# Patient Record
Sex: MALE | Race: WHITE | NOT HISPANIC OR LATINO | Employment: FULL TIME | URBAN - METROPOLITAN AREA
[De-identification: names, ages, dates, MRNs, and addresses within clinical notes are randomized per-mention and may not be internally consistent; named-entity substitution may affect disease eponyms.]

---

## 2017-09-05 PROBLEM — R13.10 DYSPHAGIA: Status: ACTIVE | Noted: 2017-09-05

## 2017-09-26 PROBLEM — C34.91 BRONCHOGENIC CANCER OF RIGHT LUNG: Status: ACTIVE | Noted: 2017-09-26

## 2017-10-03 ENCOUNTER — TELEPHONE (OUTPATIENT)
Dept: INFUSION THERAPY | Facility: HOSPITAL | Age: 67
End: 2017-10-03

## 2017-10-09 PROBLEM — C15.9 MALIGNANT NEOPLASM OF ESOPHAGUS: Status: ACTIVE | Noted: 2017-10-09

## 2017-10-10 ENCOUNTER — INFUSION (OUTPATIENT)
Dept: INFUSION THERAPY | Facility: HOSPITAL | Age: 67
End: 2017-10-10
Attending: INTERNAL MEDICINE
Payer: MEDICARE

## 2017-10-10 VITALS
SYSTOLIC BLOOD PRESSURE: 146 MMHG | WEIGHT: 159.19 LBS | BODY MASS INDEX: 25.58 KG/M2 | HEIGHT: 66 IN | DIASTOLIC BLOOD PRESSURE: 63 MMHG | HEART RATE: 57 BPM | TEMPERATURE: 99 F | RESPIRATION RATE: 18 BRPM

## 2017-10-10 DIAGNOSIS — C34.91 BRONCHOGENIC CANCER OF RIGHT LUNG: Primary | ICD-10-CM

## 2017-10-10 PROCEDURE — 63600175 PHARM REV CODE 636 W HCPCS: Mod: PN | Performed by: INTERNAL MEDICINE

## 2017-10-10 PROCEDURE — 25000003 PHARM REV CODE 250: Mod: PN | Performed by: INTERNAL MEDICINE

## 2017-10-10 PROCEDURE — 96413 CHEMO IV INFUSION 1 HR: CPT | Mod: PN

## 2017-10-10 RX ORDER — SODIUM CHLORIDE 0.9 % (FLUSH) 0.9 %
10 SYRINGE (ML) INJECTION
Status: DISCONTINUED | OUTPATIENT
Start: 2017-10-10 | End: 2017-10-10 | Stop reason: HOSPADM

## 2017-10-10 RX ORDER — HEPARIN 100 UNIT/ML
500 SYRINGE INTRAVENOUS
Status: DISCONTINUED | OUTPATIENT
Start: 2017-10-10 | End: 2017-10-10 | Stop reason: HOSPADM

## 2017-10-10 RX ADMIN — SODIUM CHLORIDE: 9 INJECTION, SOLUTION INTRAVENOUS at 02:10

## 2017-10-10 RX ADMIN — SODIUM CHLORIDE 152.5 MG: 9 INJECTION, SOLUTION INTRAVENOUS at 02:10

## 2017-10-20 ENCOUNTER — LAB VISIT (OUTPATIENT)
Dept: LAB | Facility: HOSPITAL | Age: 67
End: 2017-10-20
Attending: INTERNAL MEDICINE
Payer: MEDICARE

## 2017-10-20 DIAGNOSIS — R93.89 ABNORMAL FINDINGS ON DIAGNOSTIC IMAGING OF OTHER SPECIFIED BODY STRUCTURES: ICD-10-CM

## 2017-10-20 DIAGNOSIS — C15.9 ESOPHAGEAL CANCER: Primary | ICD-10-CM

## 2017-10-20 LAB
ALBUMIN SERPL BCP-MCNC: 3.2 G/DL
ALP SERPL-CCNC: 124 U/L
ALT SERPL W/O P-5'-P-CCNC: 21 U/L
ANION GAP SERPL CALC-SCNC: 12 MMOL/L
AST SERPL-CCNC: 15 U/L
BASOPHILS # BLD AUTO: 0 K/UL
BASOPHILS NFR BLD: 0.4 %
BILIRUB SERPL-MCNC: 0.3 MG/DL
BUN SERPL-MCNC: 23 MG/DL
CALCIUM SERPL-MCNC: 9.8 MG/DL
CHLORIDE SERPL-SCNC: 95 MMOL/L
CO2 SERPL-SCNC: 28 MMOL/L
CREAT SERPL-MCNC: 0.9 MG/DL
DIFFERENTIAL METHOD: ABNORMAL
EOSINOPHIL # BLD AUTO: 0.1 K/UL
EOSINOPHIL NFR BLD: 1.3 %
ERYTHROCYTE [DISTWIDTH] IN BLOOD BY AUTOMATED COUNT: 13.5 %
EST. GFR  (AFRICAN AMERICAN): >60 ML/MIN/1.73 M^2
EST. GFR  (NON AFRICAN AMERICAN): >60 ML/MIN/1.73 M^2
GLUCOSE SERPL-MCNC: 81 MG/DL
HCT VFR BLD AUTO: 34.1 %
HGB BLD-MCNC: 11.5 G/DL
LYMPHOCYTES # BLD AUTO: 2 K/UL
LYMPHOCYTES NFR BLD: 17.5 %
MAGNESIUM SERPL-MCNC: 2.3 MG/DL
MCH RBC QN AUTO: 28.7 PG
MCHC RBC AUTO-ENTMCNC: 33.6 G/DL
MCV RBC AUTO: 85 FL
MONOCYTES # BLD AUTO: 1 K/UL
MONOCYTES NFR BLD: 8.9 %
NEUTROPHILS # BLD AUTO: 8.1 K/UL
NEUTROPHILS NFR BLD: 71.9 %
PLATELET # BLD AUTO: 499 K/UL
PMV BLD AUTO: 8.1 FL
POTASSIUM SERPL-SCNC: 4.6 MMOL/L
PROT SERPL-MCNC: 6.9 G/DL
RBC # BLD AUTO: 4 M/UL
SODIUM SERPL-SCNC: 135 MMOL/L
TSH SERPL DL<=0.005 MIU/L-ACNC: 1.71 UIU/ML
WBC # BLD AUTO: 11.2 K/UL

## 2017-10-20 PROCEDURE — 80053 COMPREHEN METABOLIC PANEL: CPT

## 2017-10-20 PROCEDURE — 84443 ASSAY THYROID STIM HORMONE: CPT

## 2017-10-20 PROCEDURE — 36415 COLL VENOUS BLD VENIPUNCTURE: CPT

## 2017-10-20 PROCEDURE — 85025 COMPLETE CBC W/AUTO DIFF WBC: CPT

## 2017-10-20 PROCEDURE — 83735 ASSAY OF MAGNESIUM: CPT

## 2017-10-31 ENCOUNTER — INFUSION (OUTPATIENT)
Dept: INFUSION THERAPY | Facility: HOSPITAL | Age: 67
End: 2017-10-31
Attending: INTERNAL MEDICINE
Payer: MEDICARE

## 2017-10-31 VITALS
DIASTOLIC BLOOD PRESSURE: 56 MMHG | TEMPERATURE: 99 F | BODY MASS INDEX: 25.12 KG/M2 | HEIGHT: 66 IN | HEART RATE: 62 BPM | WEIGHT: 156.31 LBS | RESPIRATION RATE: 18 BRPM | SYSTOLIC BLOOD PRESSURE: 118 MMHG

## 2017-10-31 DIAGNOSIS — C34.91 BRONCHOGENIC CANCER OF RIGHT LUNG: Primary | ICD-10-CM

## 2017-10-31 PROCEDURE — 63600175 PHARM REV CODE 636 W HCPCS: Mod: JW,PN | Performed by: NURSE PRACTITIONER

## 2017-10-31 PROCEDURE — 25000003 PHARM REV CODE 250: Mod: PN | Performed by: NURSE PRACTITIONER

## 2017-10-31 PROCEDURE — 96413 CHEMO IV INFUSION 1 HR: CPT | Mod: PN

## 2017-10-31 RX ADMIN — SODIUM CHLORIDE: 9 INJECTION, SOLUTION INTRAVENOUS at 01:10

## 2017-10-31 RX ADMIN — SODIUM CHLORIDE 144.5 MG: 9 INJECTION, SOLUTION INTRAVENOUS at 02:10

## 2017-11-21 ENCOUNTER — INFUSION (OUTPATIENT)
Dept: INFUSION THERAPY | Facility: HOSPITAL | Age: 67
End: 2017-11-21
Attending: INTERNAL MEDICINE
Payer: MEDICARE

## 2017-11-21 VITALS
WEIGHT: 152.5 LBS | RESPIRATION RATE: 16 BRPM | OXYGEN SATURATION: 99 % | SYSTOLIC BLOOD PRESSURE: 124 MMHG | HEART RATE: 55 BPM | HEIGHT: 66 IN | DIASTOLIC BLOOD PRESSURE: 57 MMHG | BODY MASS INDEX: 24.51 KG/M2 | TEMPERATURE: 98 F

## 2017-11-21 DIAGNOSIS — C34.91 BRONCHOGENIC CANCER OF RIGHT LUNG: Primary | ICD-10-CM

## 2017-11-21 PROCEDURE — A4216 STERILE WATER/SALINE, 10 ML: HCPCS | Mod: PN | Performed by: PHYSICIAN ASSISTANT

## 2017-11-21 PROCEDURE — 63600175 PHARM REV CODE 636 W HCPCS: Mod: PN | Performed by: PHYSICIAN ASSISTANT

## 2017-11-21 PROCEDURE — 96413 CHEMO IV INFUSION 1 HR: CPT | Mod: PN

## 2017-11-21 PROCEDURE — 96367 TX/PROPH/DG ADDL SEQ IV INF: CPT | Mod: PN

## 2017-11-21 PROCEDURE — 25000003 PHARM REV CODE 250: Mod: PN | Performed by: PHYSICIAN ASSISTANT

## 2017-11-21 RX ORDER — OXYCODONE HYDROCHLORIDE 5 MG/1
TABLET ORAL
Refills: 0 | COMMUNITY
Start: 2017-11-11 | End: 2017-12-12 | Stop reason: SDUPTHER

## 2017-11-21 RX ORDER — SODIUM CHLORIDE 0.9 % (FLUSH) 0.9 %
10 SYRINGE (ML) INJECTION
Status: DISCONTINUED | OUTPATIENT
Start: 2017-11-21 | End: 2017-11-21 | Stop reason: HOSPADM

## 2017-11-21 RX ADMIN — SODIUM CHLORIDE 16 MG: 9 INJECTION, SOLUTION INTRAVENOUS at 02:11

## 2017-11-21 RX ADMIN — SODIUM CHLORIDE 144 MG: 900 INJECTION, SOLUTION INTRAVENOUS at 03:11

## 2017-11-21 RX ADMIN — SODIUM CHLORIDE: 900 INJECTION, SOLUTION INTRAVENOUS at 02:11

## 2017-11-21 RX ADMIN — SODIUM CHLORIDE, PRESERVATIVE FREE 10 ML: 5 INJECTION INTRAVENOUS at 02:11

## 2017-11-21 NOTE — PLAN OF CARE
Problem: Patient Care Overview  Goal: Plan of Care Review  Outcome: Ongoing (interventions implemented as appropriate)  Pt received Keytruda, tolerated well, AVS given to pt, discharged, ambulated from clinic with steady gait

## 2017-12-12 ENCOUNTER — INFUSION (OUTPATIENT)
Dept: INFUSION THERAPY | Facility: HOSPITAL | Age: 67
End: 2017-12-12
Attending: INTERNAL MEDICINE
Payer: MEDICARE

## 2017-12-12 VITALS
HEART RATE: 54 BPM | RESPIRATION RATE: 16 BRPM | WEIGHT: 147 LBS | TEMPERATURE: 99 F | DIASTOLIC BLOOD PRESSURE: 59 MMHG | SYSTOLIC BLOOD PRESSURE: 117 MMHG | BODY MASS INDEX: 23.63 KG/M2 | HEIGHT: 66 IN

## 2017-12-12 DIAGNOSIS — C34.91 BRONCHOGENIC CANCER OF RIGHT LUNG: Primary | ICD-10-CM

## 2017-12-12 PROBLEM — K57.32 DIVERTICULITIS OF LARGE INTESTINE WITHOUT PERFORATION OR ABSCESS WITHOUT BLEEDING: Status: ACTIVE | Noted: 2017-12-12

## 2017-12-12 PROCEDURE — 63600175 PHARM REV CODE 636 W HCPCS: Mod: JW,PN | Performed by: NURSE PRACTITIONER

## 2017-12-12 PROCEDURE — 25000003 PHARM REV CODE 250: Mod: PN | Performed by: NURSE PRACTITIONER

## 2017-12-12 PROCEDURE — A4216 STERILE WATER/SALINE, 10 ML: HCPCS | Mod: PN | Performed by: NURSE PRACTITIONER

## 2017-12-12 PROCEDURE — 96413 CHEMO IV INFUSION 1 HR: CPT | Mod: PN

## 2017-12-12 RX ORDER — SODIUM CHLORIDE 0.9 % (FLUSH) 0.9 %
10 SYRINGE (ML) INJECTION
Status: DISCONTINUED | OUTPATIENT
Start: 2017-12-12 | End: 2017-12-13 | Stop reason: HOSPADM

## 2017-12-12 RX ADMIN — SODIUM CHLORIDE 144.5 MG: 9 INJECTION, SOLUTION INTRAVENOUS at 03:12

## 2017-12-12 RX ADMIN — SODIUM CHLORIDE: 900 INJECTION, SOLUTION INTRAVENOUS at 03:12

## 2017-12-12 RX ADMIN — Medication 10 ML: at 04:12

## 2017-12-12 RX ADMIN — Medication 10 ML: at 03:12

## 2017-12-29 ENCOUNTER — LAB VISIT (OUTPATIENT)
Dept: LAB | Facility: HOSPITAL | Age: 67
End: 2017-12-29
Attending: INTERNAL MEDICINE
Payer: MEDICARE

## 2017-12-29 DIAGNOSIS — C34.91 BRONCHOGENIC CANCER OF RIGHT LUNG: ICD-10-CM

## 2017-12-29 DIAGNOSIS — E07.9 THYROID DYSFUNCTION: ICD-10-CM

## 2017-12-29 LAB
ALBUMIN SERPL BCP-MCNC: 3.8 G/DL
ALP SERPL-CCNC: 116 U/L
ALT SERPL W/O P-5'-P-CCNC: 55 U/L
ANION GAP SERPL CALC-SCNC: 10 MMOL/L
AST SERPL-CCNC: 28 U/L
BASOPHILS # BLD AUTO: 0.1 K/UL
BASOPHILS NFR BLD: 0.9 %
BILIRUB SERPL-MCNC: 0.2 MG/DL
BUN SERPL-MCNC: 21 MG/DL
CALCIUM SERPL-MCNC: 9.6 MG/DL
CHLORIDE SERPL-SCNC: 93 MMOL/L
CO2 SERPL-SCNC: 31 MMOL/L
CREAT SERPL-MCNC: 0.68 MG/DL
DIFFERENTIAL METHOD: ABNORMAL
EOSINOPHIL # BLD AUTO: 0.1 K/UL
EOSINOPHIL NFR BLD: 1.1 %
ERYTHROCYTE [DISTWIDTH] IN BLOOD BY AUTOMATED COUNT: 13.9 %
EST. GFR  (AFRICAN AMERICAN): >60 ML/MIN/1.73 M^2
EST. GFR  (NON AFRICAN AMERICAN): >60 ML/MIN/1.73 M^2
GLUCOSE SERPL-MCNC: 150 MG/DL
HCT VFR BLD AUTO: 32.2 %
HGB BLD-MCNC: 10.2 G/DL
LYMPHOCYTES # BLD AUTO: 1.9 K/UL
LYMPHOCYTES NFR BLD: 16 %
MAGNESIUM SERPL-MCNC: 2.3 MG/DL
MCH RBC QN AUTO: 26.4 PG
MCHC RBC AUTO-ENTMCNC: 31.7 G/DL
MCV RBC AUTO: 83 FL
MONOCYTES # BLD AUTO: 1.2 K/UL
MONOCYTES NFR BLD: 10.2 %
NEUTROPHILS # BLD AUTO: 8.4 K/UL
NEUTROPHILS NFR BLD: 71.8 %
NRBC BLD-RTO: 0 /100 WBC
PLATELET # BLD AUTO: 573 K/UL
PMV BLD AUTO: 9.2 FL
POTASSIUM SERPL-SCNC: 4.7 MMOL/L
PROT SERPL-MCNC: 7.4 G/DL
RBC # BLD AUTO: 3.86 M/UL
SODIUM SERPL-SCNC: 134 MMOL/L
TSH SERPL DL<=0.005 MIU/L-ACNC: 1.71 UIU/ML
WBC # BLD AUTO: 11.63 K/UL

## 2017-12-29 PROCEDURE — 83735 ASSAY OF MAGNESIUM: CPT | Mod: PN

## 2017-12-29 PROCEDURE — 85025 COMPLETE CBC W/AUTO DIFF WBC: CPT | Mod: PN

## 2017-12-29 PROCEDURE — 85025 COMPLETE CBC W/AUTO DIFF WBC: CPT

## 2017-12-29 PROCEDURE — 36415 COLL VENOUS BLD VENIPUNCTURE: CPT | Mod: PN

## 2017-12-29 PROCEDURE — 84443 ASSAY THYROID STIM HORMONE: CPT | Mod: PN

## 2017-12-29 PROCEDURE — 83735 ASSAY OF MAGNESIUM: CPT

## 2017-12-29 PROCEDURE — 80053 COMPREHEN METABOLIC PANEL: CPT

## 2017-12-29 PROCEDURE — 84443 ASSAY THYROID STIM HORMONE: CPT

## 2017-12-29 PROCEDURE — 80053 COMPREHEN METABOLIC PANEL: CPT | Mod: PN

## 2018-01-02 ENCOUNTER — INFUSION (OUTPATIENT)
Dept: INFUSION THERAPY | Facility: HOSPITAL | Age: 68
End: 2018-01-02
Attending: INTERNAL MEDICINE
Payer: MEDICARE

## 2018-01-02 VITALS
SYSTOLIC BLOOD PRESSURE: 131 MMHG | DIASTOLIC BLOOD PRESSURE: 59 MMHG | RESPIRATION RATE: 16 BRPM | HEIGHT: 66 IN | HEART RATE: 69 BPM | WEIGHT: 139.81 LBS | TEMPERATURE: 99 F | BODY MASS INDEX: 22.47 KG/M2

## 2018-01-02 DIAGNOSIS — C34.91 BRONCHOGENIC CANCER OF RIGHT LUNG: Primary | ICD-10-CM

## 2018-01-02 PROCEDURE — A4216 STERILE WATER/SALINE, 10 ML: HCPCS | Mod: PN | Performed by: NURSE PRACTITIONER

## 2018-01-02 PROCEDURE — 96413 CHEMO IV INFUSION 1 HR: CPT | Mod: PN

## 2018-01-02 PROCEDURE — 25000003 PHARM REV CODE 250: Mod: PN | Performed by: NURSE PRACTITIONER

## 2018-01-02 PROCEDURE — 63600175 PHARM REV CODE 636 W HCPCS: Mod: JW,TB,PN | Performed by: NURSE PRACTITIONER

## 2018-01-02 RX ORDER — SODIUM CHLORIDE 0.9 % (FLUSH) 0.9 %
10 SYRINGE (ML) INJECTION
Status: DISCONTINUED | OUTPATIENT
Start: 2018-01-02 | End: 2018-01-02 | Stop reason: HOSPADM

## 2018-01-02 RX ADMIN — Medication 10 ML: at 03:01

## 2018-01-02 RX ADMIN — SODIUM CHLORIDE: 0.9 INJECTION, SOLUTION INTRAVENOUS at 03:01

## 2018-01-02 RX ADMIN — SODIUM CHLORIDE 126.75 MG: 9 INJECTION, SOLUTION INTRAVENOUS at 03:01

## 2018-01-02 RX ADMIN — Medication 10 ML: at 04:01

## 2018-01-03 NOTE — PLAN OF CARE
Problem: Patient Care Overview  Goal: Plan of Care Review  Outcome: Ongoing (interventions implemented as appropriate)  Adequate for discharge.   Pt tolerated infusion without noted distress.  Reviewed upcoming appointments.  All questions answered.  Ambulated from infusion center independently with wife.

## 2018-01-12 ENCOUNTER — DOCUMENTATION ONLY (OUTPATIENT)
Dept: INFUSION THERAPY | Facility: HOSPITAL | Age: 68
End: 2018-01-12

## 2018-01-12 NOTE — PROGRESS NOTES
Nutrition: Met with patient and patients wife yesterday 1/11/2018 to discuss patients nutrition. Informed me patient has a PEG tube and hes currently adminsitering 5 cans of Osmolite 1.5 per day. Patient flushes with 120mL before and after each can. Patient is receiving 1775 cals, 74.5g protien and  2100ml water. Tatietn reports constipation issues. Wt: 147# Patient informed me he is losing weight and has previously lost 20#s prior to surgery. Discussed the importance of weight maintenance and nutrition during treatment. Discussed changing to a mixture of Osmolite 1.5 and Jevity 1.5 to increase fiber. Will call [employer] to discuss changing nutrition RX. Will follow up with patient today. Tiffanie Patton, MS, RD, LDN

## 2018-01-12 NOTE — PROGRESS NOTES
Nutrition: Met with patient and patients wife yesterday 1/11/2018 to discuss patients nutrition. Informed me patient has a PEG tube and hes currently adminsitering 5 cans of Osmolite 1.5 per day. Patient flushes with 120mL before and after each can. Patient is receiving 1775 cals, 74.5g protien and  2100ml water. Tatietn reports constipation issues. Wt: 147# Patient informed me he is losing weight and has previously lost 20#s prior to surgery. Discussed the importance of weight maintenance and nutrition during treatment. Discussed changing to a mixture of Osmolite 1.5 and Jevity 1.5 to increase fiber. Will call [employer] [facility] to discuss changing nutrition RX. Will follow up with patient today. Tiffanie Patton, MS, RD, LDN

## 2018-01-16 ENCOUNTER — DOCUMENTATION ONLY (OUTPATIENT)
Dept: INFUSION THERAPY | Facility: HOSPITAL | Age: 68
End: 2018-01-16

## 2018-01-16 NOTE — PROGRESS NOTES
Nutrition: Sent new orders to [employer]. Patient will begin administering 2 cans of jevity 1.5 and 4 cans of Osmolite 1.5. Instruct patient to flush with 90mL water before and after each can. Tiffanie Patton MS, RD ,LDN

## 2018-01-23 ENCOUNTER — DOCUMENTATION ONLY (OUTPATIENT)
Dept: INFUSION THERAPY | Facility: HOSPITAL | Age: 68
End: 2018-01-23

## 2018-01-23 ENCOUNTER — INFUSION (OUTPATIENT)
Dept: INFUSION THERAPY | Facility: HOSPITAL | Age: 68
End: 2018-01-23
Attending: INTERNAL MEDICINE
Payer: MEDICARE

## 2018-01-23 VITALS
SYSTOLIC BLOOD PRESSURE: 113 MMHG | BODY MASS INDEX: 22.82 KG/M2 | RESPIRATION RATE: 16 BRPM | TEMPERATURE: 99 F | HEIGHT: 66 IN | WEIGHT: 142 LBS | OXYGEN SATURATION: 97 % | HEART RATE: 61 BPM | DIASTOLIC BLOOD PRESSURE: 49 MMHG

## 2018-01-23 DIAGNOSIS — C34.91 BRONCHOGENIC CANCER OF RIGHT LUNG: Primary | ICD-10-CM

## 2018-01-23 PROCEDURE — 63600175 PHARM REV CODE 636 W HCPCS: Mod: TB,PN | Performed by: PHYSICIAN ASSISTANT

## 2018-01-23 PROCEDURE — 96413 CHEMO IV INFUSION 1 HR: CPT | Mod: PN

## 2018-01-23 PROCEDURE — A4216 STERILE WATER/SALINE, 10 ML: HCPCS | Mod: PN | Performed by: PHYSICIAN ASSISTANT

## 2018-01-23 PROCEDURE — 25000003 PHARM REV CODE 250: Mod: PN | Performed by: PHYSICIAN ASSISTANT

## 2018-01-23 RX ORDER — SODIUM CHLORIDE 0.9 % (FLUSH) 0.9 %
10 SYRINGE (ML) INJECTION
Status: DISCONTINUED | OUTPATIENT
Start: 2018-01-23 | End: 2018-01-23 | Stop reason: HOSPADM

## 2018-01-23 RX ADMIN — Medication 10 ML: at 02:01

## 2018-01-23 RX ADMIN — SODIUM CHLORIDE 126.75 MG: 9 INJECTION, SOLUTION INTRAVENOUS at 02:01

## 2018-01-23 RX ADMIN — SODIUM CHLORIDE: 900 INJECTION, SOLUTION INTRAVENOUS at 02:01

## 2018-01-23 NOTE — PROGRESS NOTES
Nutrition: Met saran pt and patient wife today. Patient informed me he is administering 5 cans of formula per day (2 jevity 1.5 and 3 cans osmolite 1.5) Patient reports still having issues with constipation, however he believes his bowels are becoming normal. Pt informed me the cap of his Y-port is broken. Called [employer] and ordering patient a new y-port. Will inform patient. Discussed checking residuals. Encouraged to call with questions. Will continue to follow up weekly. Tiffanie Patton,MS, RD ,LDN

## 2018-02-16 ENCOUNTER — INFUSION (OUTPATIENT)
Dept: INFUSION THERAPY | Facility: HOSPITAL | Age: 68
End: 2018-02-16
Attending: INTERNAL MEDICINE
Payer: MEDICARE

## 2018-02-16 VITALS
RESPIRATION RATE: 16 BRPM | DIASTOLIC BLOOD PRESSURE: 56 MMHG | HEART RATE: 66 BPM | TEMPERATURE: 99 F | SYSTOLIC BLOOD PRESSURE: 111 MMHG | WEIGHT: 134.63 LBS | HEIGHT: 66 IN | OXYGEN SATURATION: 97 % | BODY MASS INDEX: 21.64 KG/M2

## 2018-02-16 DIAGNOSIS — R50.9 FEVER AND CHILLS: ICD-10-CM

## 2018-02-16 DIAGNOSIS — C34.91 BRONCHOGENIC CANCER OF RIGHT LUNG: Primary | ICD-10-CM

## 2018-02-16 PROCEDURE — 25000003 PHARM REV CODE 250: Mod: PN | Performed by: PHYSICIAN ASSISTANT

## 2018-02-16 PROCEDURE — 96413 CHEMO IV INFUSION 1 HR: CPT | Mod: PN

## 2018-02-16 PROCEDURE — 87086 URINE CULTURE/COLONY COUNT: CPT | Mod: PN

## 2018-02-16 PROCEDURE — 87040 BLOOD CULTURE FOR BACTERIA: CPT | Mod: PN

## 2018-02-16 PROCEDURE — 63600175 PHARM REV CODE 636 W HCPCS: Mod: JW,TB,PN | Performed by: PHYSICIAN ASSISTANT

## 2018-02-16 PROCEDURE — 87086 URINE CULTURE/COLONY COUNT: CPT

## 2018-02-16 PROCEDURE — 87040 BLOOD CULTURE FOR BACTERIA: CPT

## 2018-02-16 PROCEDURE — A4216 STERILE WATER/SALINE, 10 ML: HCPCS | Mod: PN | Performed by: PHYSICIAN ASSISTANT

## 2018-02-16 RX ORDER — SODIUM CHLORIDE 0.9 % (FLUSH) 0.9 %
10 SYRINGE (ML) INJECTION
Status: DISCONTINUED | OUTPATIENT
Start: 2018-02-16 | End: 2018-02-16 | Stop reason: HOSPADM

## 2018-02-16 RX ADMIN — Medication 10 ML: at 02:02

## 2018-02-16 RX ADMIN — SODIUM CHLORIDE: 0.9 INJECTION, SOLUTION INTRAVENOUS at 03:02

## 2018-02-16 RX ADMIN — SODIUM CHLORIDE 126.75 MG: 9 INJECTION, SOLUTION INTRAVENOUS at 03:02

## 2018-02-16 RX ADMIN — Medication 10 ML: at 04:02

## 2018-02-16 NOTE — NURSING
1430 - pt c/o nightly fevers.  Tmax 100.7.  Kimberly at chairside.  Fever work-up done.  Antibiotics called in, pt advised to start them this evening.  Pt verbalized an understanding.  Advised to call MD on call if symptoms worsen.

## 2018-02-18 LAB — BACTERIA UR CULT: NO GROWTH

## 2018-02-21 LAB — BACTERIA BLD CULT: NORMAL

## 2018-03-09 ENCOUNTER — INFUSION (OUTPATIENT)
Dept: INFUSION THERAPY | Facility: HOSPITAL | Age: 68
End: 2018-03-09
Attending: INTERNAL MEDICINE
Payer: MEDICARE

## 2018-03-09 VITALS
BODY MASS INDEX: 21.69 KG/M2 | DIASTOLIC BLOOD PRESSURE: 62 MMHG | HEART RATE: 60 BPM | TEMPERATURE: 98 F | RESPIRATION RATE: 16 BRPM | WEIGHT: 135 LBS | HEIGHT: 66 IN | SYSTOLIC BLOOD PRESSURE: 113 MMHG

## 2018-03-09 DIAGNOSIS — C34.91 BRONCHOGENIC CANCER OF RIGHT LUNG: Primary | ICD-10-CM

## 2018-03-09 PROCEDURE — 96413 CHEMO IV INFUSION 1 HR: CPT | Mod: PN

## 2018-03-09 PROCEDURE — 25000003 PHARM REV CODE 250: Mod: PN | Performed by: PHYSICIAN ASSISTANT

## 2018-03-09 PROCEDURE — A4216 STERILE WATER/SALINE, 10 ML: HCPCS | Mod: PN | Performed by: PHYSICIAN ASSISTANT

## 2018-03-09 PROCEDURE — 63600175 PHARM REV CODE 636 W HCPCS: Mod: JW,TB,PN | Performed by: PHYSICIAN ASSISTANT

## 2018-03-09 RX ORDER — SODIUM CHLORIDE 0.9 % (FLUSH) 0.9 %
10 SYRINGE (ML) INJECTION
Status: DISCONTINUED | OUTPATIENT
Start: 2018-03-09 | End: 2018-03-09 | Stop reason: HOSPADM

## 2018-03-09 RX ADMIN — Medication 10 ML: at 03:03

## 2018-03-09 RX ADMIN — SODIUM CHLORIDE: 900 INJECTION, SOLUTION INTRAVENOUS at 02:03

## 2018-03-09 RX ADMIN — SODIUM CHLORIDE 126.75 MG: 9 INJECTION, SOLUTION INTRAVENOUS at 02:03

## 2018-03-09 RX ADMIN — Medication 10 ML: at 02:03

## 2018-03-10 NOTE — PLAN OF CARE
Problem: Patient Care Overview  Goal: Plan of Care Review  Outcome: Ongoing (interventions implemented as appropriate)  Adequate for discharge.   Pt tolerated Keytruda infusion without noted distress.  Reviewed upcoming appointments.  All questions answered. Advised to call MD with any questions or concerns.   Ambulated from infusion center independently with wife.

## 2018-03-29 ENCOUNTER — INFUSION (OUTPATIENT)
Dept: INFUSION THERAPY | Facility: HOSPITAL | Age: 68
End: 2018-03-29
Attending: INTERNAL MEDICINE
Payer: MEDICARE

## 2018-03-29 VITALS
HEART RATE: 69 BPM | WEIGHT: 135.31 LBS | SYSTOLIC BLOOD PRESSURE: 115 MMHG | HEIGHT: 66 IN | BODY MASS INDEX: 21.75 KG/M2 | DIASTOLIC BLOOD PRESSURE: 65 MMHG | RESPIRATION RATE: 16 BRPM | TEMPERATURE: 99 F

## 2018-03-29 DIAGNOSIS — C34.91 BRONCHOGENIC CANCER OF RIGHT LUNG: Primary | ICD-10-CM

## 2018-03-29 PROCEDURE — A4216 STERILE WATER/SALINE, 10 ML: HCPCS | Mod: PN | Performed by: NURSE PRACTITIONER

## 2018-03-29 PROCEDURE — 25000003 PHARM REV CODE 250: Mod: PN | Performed by: NURSE PRACTITIONER

## 2018-03-29 PROCEDURE — 63600175 PHARM REV CODE 636 W HCPCS: Mod: TB,PN | Performed by: NURSE PRACTITIONER

## 2018-03-29 PROCEDURE — 96413 CHEMO IV INFUSION 1 HR: CPT | Mod: PN

## 2018-03-29 RX ORDER — SODIUM CHLORIDE 0.9 % (FLUSH) 0.9 %
10 SYRINGE (ML) INJECTION
Status: DISCONTINUED | OUTPATIENT
Start: 2018-03-29 | End: 2018-03-29 | Stop reason: HOSPADM

## 2018-03-29 RX ADMIN — SODIUM CHLORIDE 126.75 MG: 9 INJECTION, SOLUTION INTRAVENOUS at 02:03

## 2018-03-29 RX ADMIN — Medication 10 ML: at 03:03

## 2018-03-29 RX ADMIN — Medication 10 ML: at 02:03

## 2018-03-29 RX ADMIN — SODIUM CHLORIDE: 9 INJECTION, SOLUTION INTRAVENOUS at 02:03

## 2018-03-29 NOTE — PLAN OF CARE
Problem: Patient Care Overview  Goal: Plan of Care Review  Outcome: Ongoing (interventions implemented as appropriate)  Adequate for discharge.   Pt tolerated Keytruda infusion without noted distress.  Reviewed upcoming appointments.  All questions answered.  Pt has an appt with Dr. Okeefe on Thursday, advised to call her if back pain worsens or he develops more symptoms prior to appt.   Ambulated from infusion center independently by self.

## 2018-03-29 NOTE — NURSING
"Pt c/o "severe" back pain when he stands or walks for a long time.  Started about 1 or 2 weeks ago.  The pain causes him to "double over" at times.  It is relieved when he lies down.  Marielle Dumont NP notified.  Pt has an appt with Dr. Okeefe on Thursday.  Advises pt to go to ED if pain gets worse.  Pt verbalized an understanding.  "

## 2018-04-11 PROBLEM — R10.9 ABDOMINAL PAIN: Status: ACTIVE | Noted: 2018-04-11

## 2018-04-11 PROBLEM — D72.829 LEUKOCYTOSIS: Status: ACTIVE | Noted: 2018-04-11

## 2018-04-11 PROBLEM — R78.81 BACTEREMIA: Status: ACTIVE | Noted: 2018-04-11

## 2018-04-12 PROBLEM — E44.0 MODERATE MALNUTRITION: Status: ACTIVE | Noted: 2018-04-12

## 2018-04-12 PROBLEM — R63.4 WEIGHT LOSS, UNINTENTIONAL: Status: ACTIVE | Noted: 2018-04-12

## 2018-04-13 PROBLEM — A48.0: Status: ACTIVE | Noted: 2018-04-13

## 2018-04-16 ENCOUNTER — DOCUMENTATION ONLY (OUTPATIENT)
Dept: INFUSION THERAPY | Facility: HOSPITAL | Age: 68
End: 2018-04-16

## 2018-04-16 NOTE — PROGRESS NOTES
Nutrition: patient called today to inform me he was recently admitted into the hospital and while he was inpatient they started the patient on ensure clear (therapudic formula) and osmolite 1.5. Patient informed me that he seemed to have tolerated that fomula better and would like his order changed to boost VHC and ensure clear (TF). Wt: 130# Called [employer] and informed them the patient would like to change formulas. Wrote order for 3 cartons boost vhc and 2.5 cartons ensure clear. Will send orders to [facility] [facility] once Dr. Okeefe signs the orders. Tiffanie Patton,MS, RD, LDN

## 2018-04-18 ENCOUNTER — DOCUMENTATION ONLY (OUTPATIENT)
Dept: INFUSION THERAPY | Facility: HOSPITAL | Age: 68
End: 2018-04-18

## 2018-04-20 ENCOUNTER — INFUSION (OUTPATIENT)
Dept: INFUSION THERAPY | Facility: HOSPITAL | Age: 68
End: 2018-04-20
Attending: INTERNAL MEDICINE
Payer: MEDICARE

## 2018-04-20 VITALS
WEIGHT: 131.81 LBS | DIASTOLIC BLOOD PRESSURE: 66 MMHG | HEIGHT: 65 IN | BODY MASS INDEX: 21.96 KG/M2 | TEMPERATURE: 98 F | SYSTOLIC BLOOD PRESSURE: 116 MMHG | RESPIRATION RATE: 16 BRPM | HEART RATE: 56 BPM | OXYGEN SATURATION: 99 %

## 2018-04-20 DIAGNOSIS — C34.91 BRONCHOGENIC CANCER OF RIGHT LUNG: Primary | ICD-10-CM

## 2018-04-20 PROCEDURE — 96413 CHEMO IV INFUSION 1 HR: CPT | Mod: PN

## 2018-04-20 PROCEDURE — 25000003 PHARM REV CODE 250: Mod: PN | Performed by: NURSE PRACTITIONER

## 2018-04-20 PROCEDURE — A4216 STERILE WATER/SALINE, 10 ML: HCPCS | Mod: PN | Performed by: NURSE PRACTITIONER

## 2018-04-20 PROCEDURE — 25000003 PHARM REV CODE 250: Mod: PN | Performed by: PHYSICIAN ASSISTANT

## 2018-04-20 PROCEDURE — 63600175 PHARM REV CODE 636 W HCPCS: Mod: TB,PN | Performed by: PHYSICIAN ASSISTANT

## 2018-04-20 RX ORDER — SODIUM CHLORIDE 0.9 % (FLUSH) 0.9 %
10 SYRINGE (ML) INJECTION
Status: DISCONTINUED | OUTPATIENT
Start: 2018-04-20 | End: 2018-04-20 | Stop reason: HOSPADM

## 2018-04-20 RX ORDER — POLYETHYLENE GLYCOL 3350, SODIUM CHLORIDE, SODIUM BICARBONATE, POTASSIUM CHLORIDE 420; 11.2; 5.72; 1.48 G/4L; G/4L; G/4L; G/4L
POWDER, FOR SOLUTION ORAL
Refills: 0 | COMMUNITY
Start: 2018-04-10 | End: 2018-07-27

## 2018-04-20 RX ADMIN — SODIUM CHLORIDE: 0.9 INJECTION, SOLUTION INTRAVENOUS at 02:04

## 2018-04-20 RX ADMIN — Medication 10 ML: at 03:04

## 2018-04-20 RX ADMIN — SODIUM CHLORIDE 200 MG: 9 INJECTION, SOLUTION INTRAVENOUS at 02:04

## 2018-04-20 RX ADMIN — Medication 10 ML: at 02:04

## 2018-05-09 ENCOUNTER — TELEPHONE (OUTPATIENT)
Dept: INFUSION THERAPY | Facility: HOSPITAL | Age: 68
End: 2018-05-09

## 2018-05-09 NOTE — TELEPHONE ENCOUNTER
Angelita Patterson 5:16 PM:   hey on Ismael Montiel, Dr. ORDOÑEZ wants to delay him til monday. He is having a scope friday   [id number]     Olya Xiong 5:19 PM:   okay thanks we will call him

## 2018-05-10 ENCOUNTER — DOCUMENTATION ONLY (OUTPATIENT)
Dept: INFUSION THERAPY | Facility: HOSPITAL | Age: 68
End: 2018-05-10

## 2018-05-14 ENCOUNTER — INFUSION (OUTPATIENT)
Dept: INFUSION THERAPY | Facility: HOSPITAL | Age: 68
End: 2018-05-14
Attending: INTERNAL MEDICINE
Payer: MEDICARE

## 2018-05-14 VITALS
DIASTOLIC BLOOD PRESSURE: 58 MMHG | HEIGHT: 65 IN | WEIGHT: 132.38 LBS | HEART RATE: 64 BPM | TEMPERATURE: 99 F | SYSTOLIC BLOOD PRESSURE: 112 MMHG | BODY MASS INDEX: 22.06 KG/M2 | RESPIRATION RATE: 16 BRPM

## 2018-05-14 DIAGNOSIS — C34.90 MALIGNANT NEOPLASM OF LUNG, UNSPECIFIED LATERALITY, UNSPECIFIED PART OF LUNG: Primary | ICD-10-CM

## 2018-05-14 LAB
ALBUMIN SERPL BCP-MCNC: 3.7 G/DL
ALP SERPL-CCNC: 156 U/L
ALT SERPL W/O P-5'-P-CCNC: 27 U/L
ANION GAP SERPL CALC-SCNC: 12 MMOL/L
AST SERPL-CCNC: 24 U/L
BASOPHILS # BLD AUTO: 0.07 K/UL
BASOPHILS NFR BLD: 0.8 %
BILIRUB SERPL-MCNC: 0.3 MG/DL
BUN SERPL-MCNC: 23 MG/DL
CALCIUM SERPL-MCNC: 9.2 MG/DL
CHLORIDE SERPL-SCNC: 97 MMOL/L
CO2 SERPL-SCNC: 28 MMOL/L
CREAT SERPL-MCNC: 0.51 MG/DL
DIFFERENTIAL METHOD: ABNORMAL
EOSINOPHIL # BLD AUTO: 0.2 K/UL
EOSINOPHIL NFR BLD: 2 %
ERYTHROCYTE [DISTWIDTH] IN BLOOD BY AUTOMATED COUNT: 16 %
EST. GFR  (AFRICAN AMERICAN): >60 ML/MIN/1.73 M^2
EST. GFR  (NON AFRICAN AMERICAN): >60 ML/MIN/1.73 M^2
GLUCOSE SERPL-MCNC: 157 MG/DL
HCT VFR BLD AUTO: 34 %
HGB BLD-MCNC: 10.8 G/DL
LYMPHOCYTES # BLD AUTO: 0.8 K/UL
LYMPHOCYTES NFR BLD: 9.2 %
MCH RBC QN AUTO: 27.3 PG
MCHC RBC AUTO-ENTMCNC: 31.8 G/DL
MCV RBC AUTO: 86 FL
MONOCYTES # BLD AUTO: 0.6 K/UL
MONOCYTES NFR BLD: 7.3 %
NEUTROPHILS # BLD AUTO: 6.8 K/UL
NEUTROPHILS NFR BLD: 80.7 %
NRBC BLD-RTO: 0 /100 WBC
PLATELET # BLD AUTO: 463 K/UL
PMV BLD AUTO: 9.8 FL
POTASSIUM SERPL-SCNC: 3.9 MMOL/L
PROT SERPL-MCNC: 7.2 G/DL
RBC # BLD AUTO: 3.95 M/UL
SODIUM SERPL-SCNC: 137 MMOL/L
WBC # BLD AUTO: 8.45 K/UL

## 2018-05-14 PROCEDURE — A4216 STERILE WATER/SALINE, 10 ML: HCPCS | Mod: PN | Performed by: INTERNAL MEDICINE

## 2018-05-14 PROCEDURE — 25000003 PHARM REV CODE 250: Mod: PN | Performed by: INTERNAL MEDICINE

## 2018-05-14 PROCEDURE — 80053 COMPREHEN METABOLIC PANEL: CPT

## 2018-05-14 PROCEDURE — 85025 COMPLETE CBC W/AUTO DIFF WBC: CPT | Mod: PN

## 2018-05-14 PROCEDURE — 36415 COLL VENOUS BLD VENIPUNCTURE: CPT | Mod: PN

## 2018-05-14 PROCEDURE — 85025 COMPLETE CBC W/AUTO DIFF WBC: CPT

## 2018-05-14 PROCEDURE — 80053 COMPREHEN METABOLIC PANEL: CPT | Mod: PN

## 2018-05-14 RX ORDER — SODIUM CHLORIDE 0.9 % (FLUSH) 0.9 %
10 SYRINGE (ML) INJECTION
Status: DISCONTINUED | OUTPATIENT
Start: 2018-05-14 | End: 2018-05-14 | Stop reason: HOSPADM

## 2018-05-14 RX ADMIN — Medication 10 ML: at 01:05

## 2018-05-21 ENCOUNTER — INFUSION (OUTPATIENT)
Dept: INFUSION THERAPY | Facility: HOSPITAL | Age: 68
End: 2018-05-21
Attending: INTERNAL MEDICINE
Payer: MEDICARE

## 2018-05-21 VITALS
BODY MASS INDEX: 22.24 KG/M2 | HEART RATE: 64 BPM | RESPIRATION RATE: 18 BRPM | WEIGHT: 133.5 LBS | TEMPERATURE: 98 F | DIASTOLIC BLOOD PRESSURE: 56 MMHG | SYSTOLIC BLOOD PRESSURE: 103 MMHG | HEIGHT: 65 IN

## 2018-05-21 DIAGNOSIS — C34.91 BRONCHOGENIC CANCER OF RIGHT LUNG: Primary | ICD-10-CM

## 2018-05-21 PROCEDURE — 96413 CHEMO IV INFUSION 1 HR: CPT | Mod: PN

## 2018-05-21 PROCEDURE — A4216 STERILE WATER/SALINE, 10 ML: HCPCS | Mod: PN | Performed by: NURSE PRACTITIONER

## 2018-05-21 PROCEDURE — 25000003 PHARM REV CODE 250: Mod: PN | Performed by: NURSE PRACTITIONER

## 2018-05-21 PROCEDURE — 63600175 PHARM REV CODE 636 W HCPCS: Mod: TB,PN | Performed by: NURSE PRACTITIONER

## 2018-05-21 RX ORDER — SODIUM CHLORIDE 0.9 % (FLUSH) 0.9 %
10 SYRINGE (ML) INJECTION
Status: DISCONTINUED | OUTPATIENT
Start: 2018-05-21 | End: 2018-05-21 | Stop reason: HOSPADM

## 2018-05-21 RX ADMIN — Medication 10 ML: at 04:05

## 2018-05-21 RX ADMIN — SODIUM CHLORIDE 200 MG: 9 INJECTION, SOLUTION INTRAVENOUS at 04:05

## 2018-05-21 RX ADMIN — SODIUM CHLORIDE: 0.9 INJECTION, SOLUTION INTRAVENOUS at 04:05

## 2018-05-21 NOTE — PLAN OF CARE
Problem: Patient Care Overview  Goal: Plan of Care Review  Outcome: Ongoing (interventions implemented as appropriate)  Adequate for discharge.   Pt tolerated Keytruda infusion without noted distress.  Reviewed upcoming appointments.  All questions answered. Advised to call MD with any questions or concerns.   Ambulated from infusion center independently by self.

## 2018-05-22 PROBLEM — J69.0 ASPIRATION PNEUMONIA: Status: ACTIVE | Noted: 2018-05-22

## 2018-05-29 ENCOUNTER — TELEPHONE (OUTPATIENT)
Dept: GASTROENTEROLOGY | Facility: CLINIC | Age: 68
End: 2018-05-29

## 2018-05-29 NOTE — TELEPHONE ENCOUNTER
----- Message from Quiana Tam sent at 5/29/2018  2:38 PM CDT -----  Contact: Self- [phone]  Esperanza- pt called to schedule a np appt- being referred by Dr. Sal Guy for his esphogus needing to be stretched- please contact pt at [phone]

## 2018-05-30 ENCOUNTER — TELEPHONE (OUTPATIENT)
Dept: GASTROENTEROLOGY | Facility: CLINIC | Age: 68
End: 2018-05-30

## 2018-05-30 DIAGNOSIS — K22.2 ESOPHAGEAL STRICTURE: Primary | ICD-10-CM

## 2018-05-31 ENCOUNTER — TELEPHONE (OUTPATIENT)
Dept: GASTROENTEROLOGY | Facility: CLINIC | Age: 68
End: 2018-05-31

## 2018-05-31 ENCOUNTER — TELEPHONE (OUTPATIENT)
Dept: ENDOSCOPY | Facility: HOSPITAL | Age: 68
End: 2018-05-31

## 2018-05-31 NOTE — TELEPHONE ENCOUNTER
Spoke with patient. EGD scheduled for 6/8 at 11:30a. Reviewed prep instructions. Mr Montiel verbalized understanding.

## 2018-05-31 NOTE — TELEPHONE ENCOUNTER
Message   Received: Yesterday   Message Contents   MD Debo Courtney MA   Caller: Unspecified (Yesterday,  5:25 PM)             Need EGD with dilation for esophageal stricture.   Ja Rodriguez MD      Please sign order

## 2018-06-05 PROBLEM — K22.2 ESOPHAGEAL STRICTURE: Status: ACTIVE | Noted: 2018-06-05

## 2018-06-08 ENCOUNTER — ANESTHESIA EVENT (OUTPATIENT)
Dept: ENDOSCOPY | Facility: HOSPITAL | Age: 68
End: 2018-06-08
Payer: MEDICARE

## 2018-06-08 ENCOUNTER — SURGERY (OUTPATIENT)
Age: 68
End: 2018-06-08

## 2018-06-08 ENCOUNTER — ANESTHESIA (OUTPATIENT)
Dept: ENDOSCOPY | Facility: HOSPITAL | Age: 68
End: 2018-06-08
Payer: MEDICARE

## 2018-06-08 ENCOUNTER — HOSPITAL ENCOUNTER (OUTPATIENT)
Facility: HOSPITAL | Age: 68
Discharge: HOME | End: 2018-06-08
Attending: INTERNAL MEDICINE | Admitting: INTERNAL MEDICINE
Payer: MEDICARE

## 2018-06-08 VITALS
OXYGEN SATURATION: 100 % | HEART RATE: 59 BPM | DIASTOLIC BLOOD PRESSURE: 54 MMHG | RESPIRATION RATE: 16 BRPM | SYSTOLIC BLOOD PRESSURE: 119 MMHG | WEIGHT: 124 LBS | HEIGHT: 66 IN | TEMPERATURE: 98 F | BODY MASS INDEX: 19.93 KG/M2

## 2018-06-08 DIAGNOSIS — K22.2 ESOPHAGEAL STRICTURE: Primary | ICD-10-CM

## 2018-06-08 PROCEDURE — 74360 X-RAY GUIDE GI DILATION: CPT | Mod: 26,,, | Performed by: INTERNAL MEDICINE

## 2018-06-08 PROCEDURE — C1874 STENT, COATED/COV W/DEL SYS: HCPCS | Performed by: INTERNAL MEDICINE

## 2018-06-08 PROCEDURE — 25000003 PHARM REV CODE 250: Performed by: INTERNAL MEDICINE

## 2018-06-08 PROCEDURE — C1769 GUIDE WIRE: HCPCS | Performed by: INTERNAL MEDICINE

## 2018-06-08 PROCEDURE — 43212 ESOPHAGOSCOP STENT PLACEMENT: CPT | Mod: ,,, | Performed by: INTERNAL MEDICINE

## 2018-06-08 PROCEDURE — 25000003 PHARM REV CODE 250: Performed by: NURSE ANESTHETIST, CERTIFIED REGISTERED

## 2018-06-08 PROCEDURE — 37000008 HC ANESTHESIA 1ST 15 MINUTES: Performed by: INTERNAL MEDICINE

## 2018-06-08 PROCEDURE — 74360 X-RAY GUIDE GI DILATION: CPT | Performed by: INTERNAL MEDICINE

## 2018-06-08 PROCEDURE — D9220A PRA ANESTHESIA: Mod: ANES,,, | Performed by: ANESTHESIOLOGY

## 2018-06-08 PROCEDURE — 27202125 HC BALLOON, EXTRACTION (ANY): Performed by: INTERNAL MEDICINE

## 2018-06-08 PROCEDURE — 63600175 PHARM REV CODE 636 W HCPCS: Performed by: NURSE ANESTHETIST, CERTIFIED REGISTERED

## 2018-06-08 PROCEDURE — 43212 ESOPHAGOSCOP STENT PLACEMENT: CPT | Performed by: INTERNAL MEDICINE

## 2018-06-08 PROCEDURE — D9220A PRA ANESTHESIA: Mod: CRNA,,, | Performed by: NURSE ANESTHETIST, CERTIFIED REGISTERED

## 2018-06-08 PROCEDURE — 37000009 HC ANESTHESIA EA ADD 15 MINS: Performed by: INTERNAL MEDICINE

## 2018-06-08 RX ORDER — LIDOCAINE HCL/PF 100 MG/5ML
SYRINGE (ML) INTRAVENOUS
Status: DISCONTINUED | OUTPATIENT
Start: 2018-06-08 | End: 2018-06-08

## 2018-06-08 RX ORDER — CIPROFLOXACIN 2 MG/ML
INJECTION, SOLUTION INTRAVENOUS
Status: DISCONTINUED | OUTPATIENT
Start: 2018-06-08 | End: 2018-06-08

## 2018-06-08 RX ORDER — PROPOFOL 10 MG/ML
VIAL (ML) INTRAVENOUS
Status: DISCONTINUED | OUTPATIENT
Start: 2018-06-08 | End: 2018-06-08

## 2018-06-08 RX ORDER — LIDOCAINE HYDROCHLORIDE 10 MG/ML
1 INJECTION, SOLUTION EPIDURAL; INFILTRATION; INTRACAUDAL; PERINEURAL ONCE
Status: DISCONTINUED | OUTPATIENT
Start: 2018-06-08 | End: 2018-06-08 | Stop reason: HOSPADM

## 2018-06-08 RX ORDER — SODIUM CHLORIDE 9 MG/ML
INJECTION, SOLUTION INTRAVENOUS CONTINUOUS
Status: DISCONTINUED | OUTPATIENT
Start: 2018-06-08 | End: 2018-06-08 | Stop reason: HOSPADM

## 2018-06-08 RX ORDER — SODIUM CHLORIDE 0.9 % (FLUSH) 0.9 %
3 SYRINGE (ML) INJECTION
Status: DISCONTINUED | OUTPATIENT
Start: 2018-06-08 | End: 2018-06-08 | Stop reason: HOSPADM

## 2018-06-08 RX ORDER — GLYCOPYRROLATE 0.2 MG/ML
INJECTION INTRAMUSCULAR; INTRAVENOUS
Status: DISCONTINUED | OUTPATIENT
Start: 2018-06-08 | End: 2018-06-08

## 2018-06-08 RX ORDER — PROPOFOL 10 MG/ML
VIAL (ML) INTRAVENOUS CONTINUOUS PRN
Status: DISCONTINUED | OUTPATIENT
Start: 2018-06-08 | End: 2018-06-08

## 2018-06-08 RX ORDER — FENTANYL CITRATE 50 UG/ML
INJECTION, SOLUTION INTRAMUSCULAR; INTRAVENOUS
Status: DISCONTINUED | OUTPATIENT
Start: 2018-06-08 | End: 2018-06-08

## 2018-06-08 RX ORDER — PHENYLEPHRINE HYDROCHLORIDE 10 MG/ML
INJECTION INTRAVENOUS
Status: DISCONTINUED | OUTPATIENT
Start: 2018-06-08 | End: 2018-06-08

## 2018-06-08 RX ORDER — AMOXICILLIN AND CLAVULANATE POTASSIUM 875; 125 MG/1; MG/1
1 TABLET, FILM COATED ORAL 2 TIMES DAILY
Qty: 20 TABLET | Refills: 0 | Status: SHIPPED | OUTPATIENT
Start: 2018-06-08 | End: 2018-06-18

## 2018-06-08 RX ADMIN — FENTANYL CITRATE 25 MCG: 50 INJECTION, SOLUTION INTRAMUSCULAR; INTRAVENOUS at 11:06

## 2018-06-08 RX ADMIN — GLYCOPYRROLATE 0.2 MG: 0.2 INJECTION, SOLUTION INTRAMUSCULAR; INTRAVENOUS at 11:06

## 2018-06-08 RX ADMIN — PROPOFOL 20 MG: 10 INJECTION, EMULSION INTRAVENOUS at 11:06

## 2018-06-08 RX ADMIN — PHENYLEPHRINE HYDROCHLORIDE 100 MCG: 10 INJECTION INTRAVENOUS at 11:06

## 2018-06-08 RX ADMIN — PROPOFOL 150 MCG/KG/MIN: 10 INJECTION, EMULSION INTRAVENOUS at 11:06

## 2018-06-08 RX ADMIN — CIPROFLOXACIN 400 MG: 2 INJECTION, SOLUTION INTRAVENOUS at 12:06

## 2018-06-08 RX ADMIN — LIDOCAINE HYDROCHLORIDE 30 MG: 20 INJECTION, SOLUTION INTRAVENOUS at 11:06

## 2018-06-08 RX ADMIN — SODIUM CHLORIDE: 0.9 INJECTION, SOLUTION INTRAVENOUS at 11:06

## 2018-06-08 RX ADMIN — PROPOFOL 70 MG: 10 INJECTION, EMULSION INTRAVENOUS at 11:06

## 2018-06-08 NOTE — TRANSFER OF CARE
"Anesthesia Transfer of Care Note    Patient: Ismael Montiel    Procedure(s) Performed: Procedure(s) (LRB):  EGD (ESOPHAGOGASTRODUODENOSCOPY) (N/A)    Patient location: [facility]    Anesthesia Type: general    Transport from OR: Transported from OR on room air with adequate spontaneous ventilation    Post pain: adequate analgesia    Post assessment: no apparent anesthetic complications and tolerated procedure well    Post vital signs: stable    Level of consciousness: sedated    Nausea/Vomiting: no nausea/vomiting    Complications: none    Transfer of care protocol was followed      Last vitals:   Visit Vitals  BP (!) 121/59 (BP Location: Left arm, Patient Position: Lying)   Pulse 66   Temp 36.6 °C (97.9 °F) (Temporal)   Resp 16   Ht 5' 6" (1.676 m)   Wt 56.2 kg (124 lb)   SpO2 98%   BMI 20.01 kg/m²     "

## 2018-06-08 NOTE — ANESTHESIA PREPROCEDURE EVALUATION
06/08/2018  Ismael Montiel is a 67 y.o., male.    Anesthesia Evaluation    I have reviewed the Patient Summary Reports.     I have reviewed the Medications.     Review of Systems  Anesthesia Hx:  History of prior surgery of interest to airway management or planning:  Denies Personal Hx of Anesthesia complications.   Social:  Former Smoker    Hematology/Oncology:        Current/Recent Cancer.   Cardiovascular:   Hypertension CAD  CABG/stent     Pulmonary:   Pneumonia        Physical Exam  General:  Well nourished    Airway/Jaw/Neck:  Airway Findings: Mouth Opening: Normal Tongue: Normal  General Airway Assessment: Adult  Mallampati: III  Improves to II with phonation.  TM Distance: Normal, at least 6 cm  Jaw/Neck Findings:  Neck ROM: Normal ROM       Chest/Lungs:  Chest/Lungs Findings: Normal Respiratory Rate     Heart/Vascular:  Heart Findings: Rate: Normal        Mental Status:  Mental Status Findings:  Alert and Oriented         Anesthesia Plan  Type of Anesthesia, risks & benefits discussed:  Anesthesia Type:  general  Patient's Preference: General   Intra-op Monitoring Plan: standard ASA monitors  Intra-op Monitoring Plan Comments:   Post Op Pain Control Plan: IV/PO Opioids PRN  Post Op Pain Control Plan Comments:   Induction:   IV  Beta Blocker:  Patient is on a Beta-Blocker and has received one dose within the past 24 hours (No further documentation required).       Informed Consent: Patient understands risks and agrees with Anesthesia plan.  Questions answered. Anesthesia consent signed with patient.  ASA Score: 3     Day of Surgery Review of History & Physical:    H&P update referred to the surgeon.     Anesthesia Plan Notes: NPO confirmed.   No history of anesthesia problems.        Ready For Surgery From Anesthesia Perspective.

## 2018-06-08 NOTE — ANESTHESIA POSTPROCEDURE EVALUATION
"Anesthesia Post Evaluation    Patient: Ismael Montiel    Procedure(s) Performed: Procedure(s) (LRB):  EGD (ESOPHAGOGASTRODUODENOSCOPY) (N/A)    Final Anesthesia Type: general  Patient location during evaluation: PACU  Patient participation: Yes- Able to Participate  Level of consciousness: awake and alert  Post-procedure vital signs: reviewed and stable  Pain management: adequate  Airway patency: patent  PONV status at discharge: No PONV  Anesthetic complications: no      Cardiovascular status: blood pressure returned to baseline  Respiratory status: unassisted  Hydration status: euvolemic  Follow-up not needed.        Visit Vitals  BP (!) 119/54   Pulse (!) 59   Temp 36.6 °C (97.9 °F) (Temporal)   Resp 16   Ht 5' 6" (1.676 m)   Wt 56.2 kg (124 lb)   SpO2 100%   BMI 20.01 kg/m²       Pain/Lainey Score: Pain Assessment Performed: Yes (6/8/2018  2:12 PM)  Presence of Pain: denies (6/8/2018  2:12 PM)  Lainey Score: 10 (6/8/2018  1:30 PM)      "

## 2018-06-08 NOTE — DISCHARGE INSTRUCTIONS

## 2018-06-08 NOTE — [employer] PATIENT INSTRUCTIONS
Discharge Summary/Instructions after an Endoscopic Procedure  Patient Name: Ismael Montiel  Patient MRN: [id number]  Patient YOB: 1950 Friday, June 08, 2018  Kandy Mayfield MD  RESTRICTIONS:  During your procedure today, you received medications for sedation.  These   medications may affect your judgment, balance and coordination.  Therefore,   for 24 hours, you have the following restrictions:   - DO NOT drive a car, operate machinery, make legal/financial decisions,   sign important papers or drink alcohol.    ACTIVITY:  Today: no heavy lifting, straining or running due to procedural   sedation/anesthesia.  The following day: return to full activity including work.  DIET:  Eat and drink normally unless instructed otherwise.     TREATMENT FOR COMMON SIDE EFFECTS:  - Mild abdominal pain, nausea, belching, bloating or excessive gas:  rest,   eat lightly and use a heating pad.  - Sore Throat: treat with throat lozenges and/or gargle with warm salt   water.  - Because air was used during the procedure, expelling large amounts of air   from your rectum or belching is normal.  - If a bowel prep was taken, you may not have a bowel movement for 1-3 days.    This is normal.  SYMPTOMS TO WATCH FOR AND REPORT TO YOUR PHYSICIAN:  1. Abdominal pain or bloating, other than gas cramps.  2. Chest pain.  3. Back pain.  4. Signs of infection such as: chills or fever occurring within 24 hours   after the procedure.  5. Rectal bleeding, which would show as bright red, maroon, or black stools.   (A tablespoon of blood from the rectum is not serious, especially if   hemorrhoids are present.)  6. Vomiting.  7. Weakness or dizziness.  GO DIRECTLY TO THE NEAREST EMERGENCY ROOM IF YOU HAVE ANY OF THE FOLLOWING:      Difficulty breathing              Chills and/or fever over 101 F   Persistent vomiting and/or vomiting blood   Severe abdominal pain   Severe chest pain   Black, tarry stools   Bleeding- more than one  tablespoon   Any other symptom or condition that you feel may need urgent attention  Your doctor recommends these additional instructions:  If any biopsies were taken, your doctors clinic will contact you in 1 to 2   weeks with any results.  - Discharge patient to home.   - NPO.   - Continue present medications.   - Repeat upper endoscopy in 2 weeks for retreatment.   - Augmentin for 10 days.   For questions, problems or results please call your physician - Kandy Mayfield MD at Work:  [phone].  [facility], EMERGENCY ROOM PHONE NUMBER: [phone]  IF A COMPLICATION OR EMERGENCY SITUATION ARISES AND YOU ARE UNABLE TO REACH   YOUR PHYSICIAN - GO DIRECTLY TO THE EMERGENCY ROOM.  Kandy Mayfield MD  6/8/2018 1:08:04 PM  This report has been verified and signed electronically.  [employer]

## 2018-06-08 NOTE — H&P
History & Physical - Short Stay  Gastroenterology      SUBJECTIVE:     Procedure: EGD    Chief Complaint/Indication for Procedure: esophageal stricture    History of Present Illness:  Patient is a 67 y.o. male with esophageal stricture likely radiation related.     PTA Medications   Medication Sig    amlodipine (NORVASC) 5 MG tablet Take 2.5 mg by mouth once daily.    benzonatate (TESSALON PERLES) 100 MG capsule Take 1 capsule (100 mg total) by mouth every 6 (six) hours as needed for Cough.    fentaNYL (DURAGESIC) 50 mcg/hr Place 1 patch onto the skin every 48 hours. In addition to 25mcg patch    HEPARIN SOD,PORCINE/0.9 % NACL (HEPARIN FLUSH IV) Inject 5 mLs into the vein As instructed. following NS as flush for midline    hydrocodone-acetaminophen 10-325mg (NORCO)  mg Tab Take 1 tablet by mouth every 4 (four) hours as needed for Pain.    L.acidophil,parac-S.therm-Bif. (RISAQUAD) Cap capsule Take 1 capsule by mouth once daily.    nebivolol (BYSTOLIC) 5 MG Tab Take 5 mg by mouth once daily.    ondansetron (ZOFRAN) 8 MG tablet Take 1 tablet (8 mg total) by mouth every 8 (eight) hours as needed for Nausea.    prochlorperazine (COMPAZINE) 10 MG tablet Take 1 tablet (10 mg total) by mouth every 6 (six) hours as needed (nausea and vomitting.  Rotate with Zofran if needed.).    0.9 % SODIUM CHLORIDE (NORMAL SALINE FLUSH INJ) Inject 10 mLs into the vein As instructed. Prior to and after IV ampicillin    albuterol 90 mcg/actuation inhaler Inhale 2 puffs into the lungs every 4 (four) hours as needed for Wheezing or Shortness of Breath. Rescue    aluminum hydrox-magnesium carb (GAVISCON EXTRA STRENGTH) 160-105 mg Chew Take 1 tablet by mouth 2 (two) times daily as needed.    diphenhydrAMINE (BENADRYL) 50 mg/mL injection Inject 50 mg into the muscle As instructed for Allergies (allergic reaction). 25-50mg IM/IV one dose only as needed for anaphylactic reaction    EPINEPHrine (EPIPEN) 0.3 mg/0.3 mL AtIn Inject  0.3 each into the skin once as needed (allergic reaction). take 0.3mg into the skin or muscle x 1 dose only as needed for allergic reaction    fentaNYL (DURAGESIC) 25 mcg/hr Place 1 patch onto the skin every 72 hours. In addition to a 50 mcg patch    fentaNYL (DURAGESIC) 50 mcg/hr Place 1 patch onto the skin every 72 hours.    fentaNYL (DURAGESIC) 50 mcg/hr Place 1 patch onto the skin every 48 hours.    hydrocodone-chlorpheniramine (TUSSIONEX) 10-8 mg/5 mL suspension 5 mLs by Per G Tube route 2 (two) times daily as needed for Cough.    lactulose (CHRONULAC) 10 gram/15 mL solution Take 10 g by mouth 2 (two) times daily.     metoclopramide HCl (REGLAN) 10 MG tablet Take 10 mg by mouth every 12 (twelve) hours.    nutritional supplements (OSMOLITE 1.2 HARRY) 0.06 gram-1.2 kcal/mL Liqd 1.5 Cans by Gastrostomy Tube route 4 (four) times daily.    oxyCODONE (ROXICODONE) 5 MG immediate release tablet 1 or 2 tabs per peg Q 3 HOURS PRN pain    pantoprazole (PROTONIX) 40 MG tablet Take 1 tablet (40 mg total) by mouth once daily.    peg-electrolyte soln 420 gram SolR TK 4000 ML PO ONCE       Review of patient's allergies indicates:  No Known Allergies     Past Medical History:   Diagnosis Date    CAD (coronary artery disease) 2014    Cancer     esophagus    Hyperlipidemia     Hypertension     S/P CABG x 5 2005     Past Surgical History:   Procedure Laterality Date    CARDIAC SURGERY  2005    5 vessel     CORONARY ARTERY BYPASS GRAFT      X 5    CORONARY STENT PLACEMENT      X 1    GASTROSTOMY TUBE PLACEMENT  10/09/2017    HEEL SPUR SURGERY Left 2005     Family History   Problem Relation Age of Onset    Rheum arthritis Mother     Heart disease Mother     Cancer Father         liver ca    Cancer Sister         lung/thoracic wall ca (neg tob)    Aneurysm Brother     No Known Problems Daughter      Social History   Substance Use Topics    Smoking status: Former Smoker     Packs/day: 0.25     Years: 50.00      Start date: 12/4/1964    Smokeless tobacco: Never Used    Alcohol use No      Comment: none past 2 month, weekends prior       Review of Systems:  Constitutional: no fever or chills  Gastrointestinal: no nausea or vomiting, no abdominal pain or change in bowel habits    OBJECTIVE:     Vital Signs (Most Recent)  Temp: 97.9 °F (36.6 °C) (06/08/18 1040)  Pulse: (!) 53 (06/08/18 1040)  Resp: 16 (06/08/18 1040)  BP: 128/66 (06/08/18 1040)  SpO2: 99 % (06/08/18 1040)         ASSESSMENT/PLAN:     Patient is a 67 y.o. male with esophageal stricture likely radiation related.     Plan: EGD    Anesthesia Plan: Moderate Sedation    ASA Grade: ASA 2 - Patient with mild systemic disease with no functional limitations

## 2018-06-15 ENCOUNTER — INFUSION (OUTPATIENT)
Dept: INFUSION THERAPY | Facility: HOSPITAL | Age: 68
End: 2018-06-15
Attending: INTERNAL MEDICINE
Payer: MEDICARE

## 2018-06-15 VITALS
SYSTOLIC BLOOD PRESSURE: 111 MMHG | HEIGHT: 66 IN | WEIGHT: 128.69 LBS | HEART RATE: 60 BPM | RESPIRATION RATE: 16 BRPM | BODY MASS INDEX: 20.68 KG/M2 | DIASTOLIC BLOOD PRESSURE: 61 MMHG | TEMPERATURE: 99 F

## 2018-06-15 DIAGNOSIS — C34.91 BRONCHOGENIC CANCER OF RIGHT LUNG: Primary | ICD-10-CM

## 2018-06-15 PROCEDURE — 25000003 PHARM REV CODE 250: Mod: PN | Performed by: PHYSICIAN ASSISTANT

## 2018-06-15 PROCEDURE — A4216 STERILE WATER/SALINE, 10 ML: HCPCS | Mod: PN | Performed by: PHYSICIAN ASSISTANT

## 2018-06-15 PROCEDURE — 96413 CHEMO IV INFUSION 1 HR: CPT | Mod: PN

## 2018-06-15 PROCEDURE — 63600175 PHARM REV CODE 636 W HCPCS: Mod: TB,PN | Performed by: PHYSICIAN ASSISTANT

## 2018-06-15 RX ORDER — SODIUM CHLORIDE 0.9 % (FLUSH) 0.9 %
10 SYRINGE (ML) INJECTION
Status: DISCONTINUED | OUTPATIENT
Start: 2018-06-15 | End: 2018-06-15 | Stop reason: HOSPADM

## 2018-06-15 RX ADMIN — SODIUM CHLORIDE 200 MG: 9 INJECTION, SOLUTION INTRAVENOUS at 03:06

## 2018-06-15 RX ADMIN — Medication 10 ML: at 03:06

## 2018-06-15 RX ADMIN — Medication 10 ML: at 04:06

## 2018-06-15 RX ADMIN — SODIUM CHLORIDE: 0.9 INJECTION, SOLUTION INTRAVENOUS at 03:06

## 2018-06-21 ENCOUNTER — TELEPHONE (OUTPATIENT)
Dept: ENDOSCOPY | Facility: HOSPITAL | Age: 68
End: 2018-06-21

## 2018-06-21 DIAGNOSIS — K22.2 ESOPHAGEAL STENOSIS: Primary | ICD-10-CM

## 2018-06-21 NOTE — TELEPHONE ENCOUNTER
Spoke to pt, he is scheduled for EGD with Dr Eliazar Mayfield 6/29/18 at 10:30 am. Medical history/medications reviewed and prep instructions e-mailed to [email or web address].

## 2018-06-29 ENCOUNTER — HOSPITAL ENCOUNTER (OUTPATIENT)
Facility: HOSPITAL | Age: 68
Discharge: HOME | End: 2018-06-29
Attending: INTERNAL MEDICINE | Admitting: INTERNAL MEDICINE
Payer: MEDICARE

## 2018-06-29 ENCOUNTER — ANESTHESIA EVENT (OUTPATIENT)
Dept: ENDOSCOPY | Facility: HOSPITAL | Age: 68
End: 2018-06-29
Payer: MEDICARE

## 2018-06-29 ENCOUNTER — ANESTHESIA (OUTPATIENT)
Dept: ENDOSCOPY | Facility: HOSPITAL | Age: 68
End: 2018-06-29
Payer: MEDICARE

## 2018-06-29 ENCOUNTER — SURGERY (OUTPATIENT)
Age: 68
End: 2018-06-29

## 2018-06-29 VITALS
RESPIRATION RATE: 18 BRPM | WEIGHT: 122 LBS | BODY MASS INDEX: 19.61 KG/M2 | HEIGHT: 66 IN | HEART RATE: 60 BPM | DIASTOLIC BLOOD PRESSURE: 45 MMHG | SYSTOLIC BLOOD PRESSURE: 102 MMHG | TEMPERATURE: 98 F | OXYGEN SATURATION: 98 %

## 2018-06-29 DIAGNOSIS — K22.2 ESOPHAGEAL STRICTURE: Primary | ICD-10-CM

## 2018-06-29 PROCEDURE — 25000003 PHARM REV CODE 250: Performed by: INTERNAL MEDICINE

## 2018-06-29 PROCEDURE — 25000003 PHARM REV CODE 250: Performed by: NURSE ANESTHETIST, CERTIFIED REGISTERED

## 2018-06-29 PROCEDURE — 43266 EGD ENDOSCOPIC STENT PLACE: CPT | Mod: ,,, | Performed by: INTERNAL MEDICINE

## 2018-06-29 PROCEDURE — 74360 X-RAY GUIDE GI DILATION: CPT | Performed by: INTERNAL MEDICINE

## 2018-06-29 PROCEDURE — D9220A PRA ANESTHESIA: Mod: CRNA,,, | Performed by: NURSE ANESTHETIST, CERTIFIED REGISTERED

## 2018-06-29 PROCEDURE — 37000009 HC ANESTHESIA EA ADD 15 MINS: Performed by: INTERNAL MEDICINE

## 2018-06-29 PROCEDURE — D9220A PRA ANESTHESIA: Mod: ANES,,, | Performed by: ANESTHESIOLOGY

## 2018-06-29 PROCEDURE — 94761 N-INVAS EAR/PLS OXIMETRY MLT: CPT

## 2018-06-29 PROCEDURE — 37000008 HC ANESTHESIA 1ST 15 MINUTES: Performed by: INTERNAL MEDICINE

## 2018-06-29 PROCEDURE — 43247 EGD REMOVE FOREIGN BODY: CPT | Performed by: INTERNAL MEDICINE

## 2018-06-29 PROCEDURE — 43247 EGD REMOVE FOREIGN BODY: CPT | Mod: 51,,, | Performed by: INTERNAL MEDICINE

## 2018-06-29 PROCEDURE — C1874 STENT, COATED/COV W/DEL SYS: HCPCS | Performed by: INTERNAL MEDICINE

## 2018-06-29 PROCEDURE — 63600175 PHARM REV CODE 636 W HCPCS: Performed by: NURSE ANESTHETIST, CERTIFIED REGISTERED

## 2018-06-29 PROCEDURE — 74360 X-RAY GUIDE GI DILATION: CPT | Mod: 26,,, | Performed by: INTERNAL MEDICINE

## 2018-06-29 PROCEDURE — 43266 EGD ENDOSCOPIC STENT PLACE: CPT | Performed by: INTERNAL MEDICINE

## 2018-06-29 DEVICE — STENT SYSTEM RMV
Type: IMPLANTABLE DEVICE | Site: ESOPHAGUS | Status: NON-FUNCTIONAL
Brand: WALLFLEX BILIARY
Removed: 2018-07-20

## 2018-06-29 RX ORDER — DEXAMETHASONE SODIUM PHOSPHATE 4 MG/ML
INJECTION, SOLUTION INTRA-ARTICULAR; INTRALESIONAL; INTRAMUSCULAR; INTRAVENOUS; SOFT TISSUE
Status: DISCONTINUED | OUTPATIENT
Start: 2018-06-29 | End: 2018-06-29

## 2018-06-29 RX ORDER — MIDAZOLAM HYDROCHLORIDE 1 MG/ML
INJECTION, SOLUTION INTRAMUSCULAR; INTRAVENOUS
Status: DISCONTINUED | OUTPATIENT
Start: 2018-06-29 | End: 2018-06-29

## 2018-06-29 RX ORDER — ONDANSETRON 2 MG/ML
INJECTION INTRAMUSCULAR; INTRAVENOUS
Status: DISCONTINUED | OUTPATIENT
Start: 2018-06-29 | End: 2018-06-29

## 2018-06-29 RX ORDER — EPHEDRINE SULFATE 50 MG/ML
INJECTION, SOLUTION INTRAVENOUS
Status: DISCONTINUED | OUTPATIENT
Start: 2018-06-29 | End: 2018-06-29

## 2018-06-29 RX ORDER — GLYCOPYRROLATE 0.2 MG/ML
INJECTION INTRAMUSCULAR; INTRAVENOUS
Status: DISCONTINUED | OUTPATIENT
Start: 2018-06-29 | End: 2018-06-29

## 2018-06-29 RX ORDER — PHENYLEPHRINE HYDROCHLORIDE 10 MG/ML
INJECTION INTRAVENOUS
Status: DISCONTINUED | OUTPATIENT
Start: 2018-06-29 | End: 2018-06-29

## 2018-06-29 RX ORDER — PROPOFOL 10 MG/ML
VIAL (ML) INTRAVENOUS
Status: DISCONTINUED | OUTPATIENT
Start: 2018-06-29 | End: 2018-06-29

## 2018-06-29 RX ORDER — SODIUM CHLORIDE 9 MG/ML
INJECTION, SOLUTION INTRAVENOUS CONTINUOUS
Status: DISCONTINUED | OUTPATIENT
Start: 2018-06-29 | End: 2018-06-29 | Stop reason: HOSPADM

## 2018-06-29 RX ORDER — SODIUM CHLORIDE 0.9 % (FLUSH) 0.9 %
3 SYRINGE (ML) INJECTION
Status: DISCONTINUED | OUTPATIENT
Start: 2018-06-29 | End: 2018-06-29 | Stop reason: HOSPADM

## 2018-06-29 RX ORDER — LIDOCAINE HCL/PF 100 MG/5ML
SYRINGE (ML) INTRAVENOUS
Status: DISCONTINUED | OUTPATIENT
Start: 2018-06-29 | End: 2018-06-29

## 2018-06-29 RX ORDER — FENTANYL CITRATE 50 UG/ML
INJECTION, SOLUTION INTRAMUSCULAR; INTRAVENOUS
Status: DISCONTINUED | OUTPATIENT
Start: 2018-06-29 | End: 2018-06-29

## 2018-06-29 RX ORDER — PROPOFOL 10 MG/ML
VIAL (ML) INTRAVENOUS CONTINUOUS PRN
Status: DISCONTINUED | OUTPATIENT
Start: 2018-06-29 | End: 2018-06-29

## 2018-06-29 RX ADMIN — LIDOCAINE HYDROCHLORIDE 50 MG: 20 INJECTION, SOLUTION INTRAVENOUS at 11:06

## 2018-06-29 RX ADMIN — PROPOFOL 50 MG: 10 INJECTION, EMULSION INTRAVENOUS at 11:06

## 2018-06-29 RX ADMIN — PROPOFOL 150 MCG/KG/MIN: 10 INJECTION, EMULSION INTRAVENOUS at 11:06

## 2018-06-29 RX ADMIN — EPHEDRINE SULFATE 5 MG: 50 INJECTION, SOLUTION INTRAMUSCULAR; INTRAVENOUS; SUBCUTANEOUS at 12:06

## 2018-06-29 RX ADMIN — FENTANYL CITRATE 25 MCG: 50 INJECTION, SOLUTION INTRAMUSCULAR; INTRAVENOUS at 12:06

## 2018-06-29 RX ADMIN — ONDANSETRON 4 MG: 2 INJECTION INTRAMUSCULAR; INTRAVENOUS at 11:06

## 2018-06-29 RX ADMIN — DEXAMETHASONE SODIUM PHOSPHATE 8 MG: 4 INJECTION, SOLUTION INTRAMUSCULAR; INTRAVENOUS at 12:06

## 2018-06-29 RX ADMIN — EPHEDRINE SULFATE 10 MG: 50 INJECTION, SOLUTION INTRAMUSCULAR; INTRAVENOUS; SUBCUTANEOUS at 12:06

## 2018-06-29 RX ADMIN — GLYCOPYRROLATE 0.2 MG: 0.2 INJECTION, SOLUTION INTRAMUSCULAR; INTRAVENOUS at 11:06

## 2018-06-29 RX ADMIN — PROPOFOL 100 MG: 10 INJECTION, EMULSION INTRAVENOUS at 11:06

## 2018-06-29 RX ADMIN — MIDAZOLAM HYDROCHLORIDE 2 MG: 1 INJECTION, SOLUTION INTRAMUSCULAR; INTRAVENOUS at 11:06

## 2018-06-29 RX ADMIN — PHENYLEPHRINE HYDROCHLORIDE 100 MCG: 10 INJECTION INTRAVENOUS at 12:06

## 2018-06-29 RX ADMIN — SODIUM CHLORIDE: 0.9 INJECTION, SOLUTION INTRAVENOUS at 11:06

## 2018-06-29 RX ADMIN — PHENYLEPHRINE HYDROCHLORIDE 200 MCG: 10 INJECTION INTRAVENOUS at 12:06

## 2018-06-29 NOTE — PROVATION PATIENT INSTRUCTIONS
Discharge Summary/Instructions after an Endoscopic Procedure  Patient Name: Ismael Montiel  Patient MRN: [id number]  Patient YOB: 1950 Friday, June 29, 2018  Kandy Mayfield MD  RESTRICTIONS:  During your procedure today, you received medications for sedation.  These   medications may affect your judgment, balance and coordination.  Therefore,   for 24 hours, you have the following restrictions:   - DO NOT drive a car, operate machinery, make legal/financial decisions,   sign important papers or drink alcohol.    ACTIVITY:  Today: no heavy lifting, straining or running due to procedural   sedation/anesthesia.  The following day: return to full activity including work.  DIET:  Eat and drink normally unless instructed otherwise.     TREATMENT FOR COMMON SIDE EFFECTS:  - Mild abdominal pain, nausea, belching, bloating or excessive gas:  rest,   eat lightly and use a heating pad.  - Sore Throat: treat with throat lozenges and/or gargle with warm salt   water.  - Because air was used during the procedure, expelling large amounts of air   from your rectum or belching is normal.  - If a bowel prep was taken, you may not have a bowel movement for 1-3 days.    This is normal.  SYMPTOMS TO WATCH FOR AND REPORT TO YOUR PHYSICIAN:  1. Abdominal pain or bloating, other than gas cramps.  2. Chest pain.  3. Back pain.  4. Signs of infection such as: chills or fever occurring within 24 hours   after the procedure.  5. Rectal bleeding, which would show as bright red, maroon, or black stools.   (A tablespoon of blood from the rectum is not serious, especially if   hemorrhoids are present.)  6. Vomiting.  7. Weakness or dizziness.  GO DIRECTLY TO THE NEAREST EMERGENCY ROOM IF YOU HAVE ANY OF THE FOLLOWING:      Difficulty breathing              Chills and/or fever over 101 F   Persistent vomiting and/or vomiting blood   Severe abdominal pain   Severe chest pain   Black, tarry stools   Bleeding- more than one  tablespoon   Any other symptom or condition that you feel may need urgent attention  Your doctor recommends these additional instructions:  If any biopsies were taken, your doctors clinic will contact you in 1 to 2   weeks with any results.  - Discharge patient to home.   - Resume previous diet.   - Continue present medications.   - Patient has a contact number available for emergencies.  The signs and   symptoms of potential delayed complications were discussed with the   patient.  Return to normal activities tomorrow.  Written discharge   instructions were provided to the patient.   - Repeat upper endoscopy in 2 weeks for retreatment. Will place 14 mm x 100   mm fully covered esophageal stent on next session.  For questions, problems or results please call your physician - Kandy Mayfield MD at Work:  [phone].  [facility], EMERGENCY ROOM PHONE NUMBER: [phone]  IF A COMPLICATION OR EMERGENCY SITUATION ARISES AND YOU ARE UNABLE TO REACH   YOUR PHYSICIAN - GO DIRECTLY TO THE EMERGENCY ROOM.  Kandy Mayfield MD  6/29/2018 1:15:33 PM  This report has been verified and signed electronically.  [employer]

## 2018-06-29 NOTE — H&P
History & Physical - Short Stay  Gastroenterology      SUBJECTIVE:     Procedure: EGD    Chief Complaint/Indication for Procedure: esophageal stricture    History of Present Illness:  Patient is a 67 y.o. male with severe esophageal stricture coming today for stent exchange.     PTA Medications   Medication Sig    amlodipine (NORVASC) 5 MG tablet Take 2.5 mg by mouth once daily.    fentaNYL (DURAGESIC) 50 mcg/hr Place 1 patch onto the skin every 48 hours. In addition to 25mcg patch    nebivolol (BYSTOLIC) 5 MG Tab Take 5 mg by mouth once daily.    oxyCODONE (ROXICODONE) 5 MG immediate release tablet 1 or 2 tabs per peg Q 3 HOURS PRN pain    0.9 % SODIUM CHLORIDE (NORMAL SALINE FLUSH INJ) Inject 10 mLs into the vein As instructed. Prior to and after IV ampicillin    albuterol 90 mcg/actuation inhaler Inhale 2 puffs into the lungs every 4 (four) hours as needed for Wheezing or Shortness of Breath. Rescue    aluminum hydrox-magnesium carb (GAVISCON EXTRA STRENGTH) 160-105 mg Chew Take 1 tablet by mouth 2 (two) times daily as needed.    benzonatate (TESSALON PERLES) 100 MG capsule Take 1 capsule (100 mg total) by mouth every 6 (six) hours as needed for Cough.    diphenhydrAMINE (BENADRYL) 50 mg/mL injection Inject 50 mg into the muscle As instructed for Allergies (allergic reaction). 25-50mg IM/IV one dose only as needed for anaphylactic reaction    EPINEPHrine (EPIPEN) 0.3 mg/0.3 mL AtIn Inject 0.3 each into the skin once as needed (allergic reaction). take 0.3mg into the skin or muscle x 1 dose only as needed for allergic reaction    fentaNYL (DURAGESIC) 25 mcg/hr Place 1 patch onto the skin every 72 hours. In addition to a 50 mcg patch    fentaNYL (DURAGESIC) 50 mcg/hr Place 1 patch onto the skin every 72 hours.    fentaNYL (DURAGESIC) 50 mcg/hr Place 1 patch onto the skin every 48 hours.    HEPARIN SOD,PORCINE/0.9 % NACL (HEPARIN FLUSH IV) Inject 5 mLs into the vein As instructed. following NS as flush  for midline    hydrocodone-acetaminophen 10-325mg (NORCO)  mg Tab Take 1 tablet by mouth every 4 (four) hours as needed for Pain.    hydrocodone-chlorpheniramine (TUSSIONEX) 10-8 mg/5 mL suspension 5 mLs by Per G Tube route 2 (two) times daily as needed for Cough.    L.acidophil,parac-S.therm-Bif. (RISAQUAD) Cap capsule Take 1 capsule by mouth once daily.    lactulose (CHRONULAC) 10 gram/15 mL solution Take 10 g by mouth 2 (two) times daily.     metoclopramide HCl (REGLAN) 10 MG tablet Take 10 mg by mouth every 12 (twelve) hours.    nutritional supplements (OSMOLITE 1.2 HARRY) 0.06 gram-1.2 kcal/mL Liqd 1.5 Cans by Gastrostomy Tube route 4 (four) times daily.    ondansetron (ZOFRAN) 8 MG tablet Take 1 tablet (8 mg total) by mouth every 8 (eight) hours as needed for Nausea.    pantoprazole (PROTONIX) 40 MG tablet Take 1 tablet (40 mg total) by mouth once daily.    peg-electrolyte soln 420 gram SolR TK 4000 ML PO ONCE    prochlorperazine (COMPAZINE) 10 MG tablet Take 1 tablet (10 mg total) by mouth every 6 (six) hours as needed (nausea and vomitting.  Rotate with Zofran if needed.).       Review of patient's allergies indicates:  No Known Allergies     Past Medical History:   Diagnosis Date    CAD (coronary artery disease) 2014    Cancer     esophagus    Hyperlipidemia     Hypertension     S/P CABG x 5 2005     Past Surgical History:   Procedure Laterality Date    CARDIAC SURGERY  2005    5 vessel     CORONARY ARTERY BYPASS GRAFT      X 5    CORONARY STENT PLACEMENT      X 1    ESOPHAGOGASTRODUODENOSCOPY N/A 6/8/2018    Procedure: EGD (ESOPHAGOGASTRODUODENOSCOPY);  Surgeon: Kandy Mayfield MD;  Location: [address]);  Service: Endoscopy;  Laterality: N/A;  Need EGD with dilation for esophageal stricture.     GASTROSTOMY TUBE PLACEMENT  10/09/2017    HEEL SPUR SURGERY Left 2005     Family History   Problem Relation Age of Onset    Rheum arthritis Mother     Heart disease Mother      Cancer Father         liver ca    Cancer Sister         lung/thoracic wall ca (neg tob)    Aneurysm Brother     No Known Problems Daughter      Social History   Substance Use Topics    Smoking status: Current Every Day Smoker     Packs/day: 0.25     Years: 50.00     Types: Cigarettes     Start date: 12/4/1964    Smokeless tobacco: Never Used    Alcohol use No      Comment:  none in 8 months       Review of Systems:  Constitutional: no fever or chills  Gastrointestinal: no nausea or vomiting, no abdominal pain or change in bowel habits    OBJECTIVE:     Vital Signs (Most Recent)          ASSESSMENT/PLAN:     Patient is a 67 y.o. male with severe esophageal stricture coming today for stent exchange.     Plan: EGD    Anesthesia Plan: Moderate Sedation    ASA Grade: ASA 2 - Patient with mild systemic disease with no functional limitations

## 2018-06-29 NOTE — DISCHARGE INSTRUCTIONS
Upper GI Endoscopy     During endoscopy, a long, flexible tube is used to view the inside of your upper GI tract.      Upper GI endoscopy allows your healthcare provider to look directly into the beginning of your gastrointestinal (GI) tract. The esophagus, stomach, and duodenum (the first part of the small intestine) make up the upper GI tract.   Before the exam  Follow these and any other instructions you are given before your endoscopy. If you dont follow the healthcare providers instructions carefully, the test may need to be canceled or done over:  · Don't eat or drink anything after midnight the night before your exam. If your exam is in the afternoon, drink only clear liquids in the morning. Don't eat or drink anything for 8 hours before the exam. In some cases, you may be able to take medicines with sips of water until 2 hours before the procedure. Speak with your healthcare provider about this.   · Bring your X-rays and any other test results you have.  · Because you will be sedated, arrange for an adult to drive you home after the exam.  · Tell your healthcare provider before the exam if you are taking any medicines or have any medical problems.  The procedure  Here is what to expect:  · You will lie on the endoscopy table. Usually patients lie on the left side.  · You will be monitored and given oxygen.  · Your throat may be numbed with a spray or gargle. You are given medicine through an intravenous (IV) line that will help you relax and remain comfortable. You may be awake or asleep during the procedure.  · The healthcare provider will put the endoscope in your mouth and down your esophagus. It is thinner than most pieces of food that you swallow. It will not affect your breathing. The medicine helps keep you from gagging.  · Air is put into your GI tract to expand it. It can make you burp.  · During the procedure, the healthcare provider can take biopsies (tissue samples), remove abnormalities,  such as polyps, or treat abnormalities through a variety of devices placed through the endoscope. You will not feel this.   · The endoscope carries images of your upper GI tract to a video screen. If you are awake, you may be able to look at the images.  · After the procedure is done, you will rest for a time. An adult must drive you home.  When to call your healthcare provider  Contact your healthcare provider if you have:  · Black or tarry stools, or blood in your stool  · Fever  · Pain in your belly that does not go away  · Nausea and vomiting, or vomiting blood   Date Last Reviewed: 7/1/2016  © 5502-1798 [employer]. [address] [place] [address], [place] [address]. All rights reserved. This information is not intended as a substitute for professional medical care. Always follow your healthcare professional's instructions.      Recovery After Procedural Sedation (Adult)  You have been given medicine by vein to make you sleep during your surgery. This may have included both a pain medicine and sleeping medicine. Most of the effects have worn off. But you may still have some drowsiness for the next 6 to 8 hours.  Home care  Follow these guidelines when you get home:  · For the next 8 hours, you should be watched by a responsible adult. This person should make sure your condition is not getting worse.  · Don't drink any alcohol for the next 24 hours.  · Don't drive, operate dangerous machinery, or make important business or personal decisions during the next 24 hours.  Note: Your healthcare provider may tell you not to take any medicine by mouth for pain or sleep in the next 4 hours. These medicines may react with the medicines you were given in the hospital. This could cause a much stronger response than usual.  Follow-up care  Follow up with your healthcare provider if you are not alert and back to your usual level of activity within 12 hours.  When to seek medical advice  Call your healthcare provider  right away if any of these occur:  · Drowsiness gets worse  · Weakness or dizziness gets worse  · Repeated vomiting  · You can't be awakened   Date Last Reviewed: 10/18/2016  © 3409-8695 [employer]. [address] [place] [address], [place] [address]. All rights reserved. This information is not intended as a substitute for professional medical care. Always follow your healthcare professional's instructions.    PATIENT INSTRUCTIONS  POST-ANESTHESIA    IMMEDIATELY FOLLOWING SURGERY:  Do not drive or operate machinery for the first twenty four hours after surgery.  Do not make any important decisions for twenty four hours after surgery or while taking narcotic pain medications or sedatives.  If you develop intractable nausea and vomiting or a severe headache please notify your doctor immediately.    FOLLOW-UP:  Please make an appointment with your surgeon as instructed. You do not need to follow up with anesthesia unless specifically instructed to do so.    WOUND CARE INSTRUCTIONS (if applicable):  Keep a dry clean dressing on the anesthesia/puncture wound site if there is drainage.  Once the wound has quit draining you may leave it open to air.  Generally you should leave the bandage intact for twenty four hours unless there is drainage.  If the epidural site drains for more than 36-48 hours please call the anesthesia department.    QUESTIONS?:  Please feel free to call your physician or the hospital  if you have any questions, and they will be happy to assist you.       [facility] Anesthesia Department  [address] [facility]  [phone]

## 2018-06-29 NOTE — ANESTHESIA PREPROCEDURE EVALUATION
06/29/2018  Ismael Montiel is a 67 y.o., male.  Patient Active Problem List   Diagnosis    CAD (coronary artery disease) - normal ekg from 10/2017 - no echo in media or chart    S/P CABG x 5    Dysphagia    Bronchogenic cancer of right lung    Diverticulitis of large intestine without perforation or abscess without bleeding    Abdominal pain    Bacteremia due to Clostridium septicum    Leukocytosis    Moderate malnutrition    Infection due to Clostridium septicum    Aspiration pneumonia    Esophageal stricture     Past Surgical History:   Procedure Laterality Date    CARDIAC SURGERY  2005    5 vessel     CORONARY ARTERY BYPASS GRAFT      X 5    CORONARY STENT PLACEMENT      X 1    ESOPHAGOGASTRODUODENOSCOPY N/A 6/8/2018    Procedure: EGD (ESOPHAGOGASTRODUODENOSCOPY);  Surgeon: Kandy Mayfield MD;  Location: [address]);  Service: Endoscopy;  Laterality: N/A;  Need EGD with dilation for esophageal stricture.     GASTROSTOMY TUBE PLACEMENT  10/09/2017    HEEL SPUR SURGERY Left 2005       Anesthesia Evaluation    I have reviewed the Patient Summary Reports.    I have reviewed the Nursing Notes.   I have reviewed the Medications.     Review of Systems      Physical Exam  General:  Well nourished    Airway/Jaw/Neck:  Airway Findings: Mouth Opening: Normal General Airway Assessment: Adult  Mallampati: II  Improves to II with phonation.  Jaw/Neck Findings:  Limited Ability to Prognath  Neck ROM: Normal ROM     Eyes/Ears/Nose:  Eyes/Ears/Nose Findings:    Dental:  Dental Findings: In tact   Chest/Lungs:  Chest/Lungs Findings: Clear to auscultation, Normal Respiratory Rate     Heart/Vascular:  Heart Findings: Rate: Normal  Rhythm: Regular Rhythm  Sounds: Normal     Abdomen:  Abdomen Findings:  Normal     Musculoskeletal:  Musculoskeletal Findings:    Skin:  Skin Findings:      Mental Status:  Mental Status Findings:  Cooperative, Alert and Oriented         Anesthesia Plan  Type of Anesthesia, risks & benefits discussed:  Anesthesia Type:  general, MAC  Patient's Preference:   Intra-op Monitoring Plan:   Intra-op Monitoring Plan Comments:   Post Op Pain Control Plan:   Post Op Pain Control Plan Comments:   Induction:   IV  Beta Blocker:  Patient is not currently on a Beta-Blocker (No further documentation required).       Informed Consent: Patient understands risks and agrees with Anesthesia plan.  Questions answered. Anesthesia consent signed with patient.  ASA Score: 2     Day of Surgery Review of History & Physical:    H&P update referred to the surgeon.         Ready For Surgery From Anesthesia Perspective.

## 2018-06-29 NOTE — PLAN OF CARE
Patient and spouse state they are ready to be discharged. Instructions and report given to patient and family. Both verbalize understanding. Patient tolerating po liquids with no difficulty. Patient states pain is at a tolerable level for them. Anesthesia consent and surgical consent in chart upon patient's discharge from [facility].

## 2018-06-29 NOTE — TRANSFER OF CARE
"Anesthesia Transfer of Care Note    Patient: Ismael Montiel    Procedure(s) Performed: Procedure(s) (LRB):  EGD (ESOPHAGOGASTRODUODENOSCOPY) (N/A)    Patient location: PACU    Anesthesia Type: general    Transport from OR: Transported from OR on room air with adequate spontaneous ventilation    Post pain: adequate analgesia    Post assessment: no apparent anesthetic complications and tolerated procedure well    Post vital signs: stable    Level of consciousness: awake and alert    Nausea/Vomiting: no nausea/vomiting    Complications: none    Transfer of care protocol was followed      Last vitals:   Visit Vitals  BP (!) 119/56 (BP Location: Left arm, Patient Position: Lying)   Pulse 83   Temp 36.6 °C (97.9 °F) (Temporal)   Resp 18   Ht 5' 6" (1.676 m)   Wt 55.3 kg (122 lb)   SpO2 99%   BMI 19.69 kg/m²     "

## 2018-07-02 ENCOUNTER — TELEPHONE (OUTPATIENT)
Dept: GASTROENTEROLOGY | Facility: CLINIC | Age: 68
End: 2018-07-02

## 2018-07-02 DIAGNOSIS — K22.2 ESOPHAGEAL STENOSIS: Primary | ICD-10-CM

## 2018-07-05 NOTE — ANESTHESIA POSTPROCEDURE EVALUATION
"Anesthesia Post Evaluation    Patient: Ismael Montiel    Procedure(s) Performed: Procedure(s) (LRB):  EGD (ESOPHAGOGASTRODUODENOSCOPY) (N/A)    Final Anesthesia Type: general  Patient location during evaluation: PACU  Patient participation: Yes- Able to Participate  Level of consciousness: awake and alert and oriented  Pain management: adequate  Airway patency: patent  PONV status at discharge: No PONV  Anesthetic complications: no      Cardiovascular status: blood pressure returned to baseline and hemodynamically stable  Respiratory status: unassisted  Hydration status: euvolemic  Follow-up not needed.        Visit Vitals  BP (!) 102/45   Pulse 60   Temp 36.7 °C (98.1 °F) (Skin)   Resp 18   Ht 5' 6" (1.676 m)   Wt 55.3 kg (122 lb)   SpO2 98%   BMI 19.69 kg/m²       Pain/Lainey Score: No Data Recorded      "

## 2018-07-06 ENCOUNTER — INFUSION (OUTPATIENT)
Dept: INFUSION THERAPY | Facility: HOSPITAL | Age: 68
End: 2018-07-06
Attending: INTERNAL MEDICINE
Payer: MEDICARE

## 2018-07-06 ENCOUNTER — TELEPHONE (OUTPATIENT)
Dept: GASTROENTEROLOGY | Facility: CLINIC | Age: 68
End: 2018-07-06

## 2018-07-06 VITALS
HEIGHT: 65 IN | RESPIRATION RATE: 16 BRPM | WEIGHT: 125.5 LBS | BODY MASS INDEX: 20.91 KG/M2 | SYSTOLIC BLOOD PRESSURE: 110 MMHG | OXYGEN SATURATION: 99 % | HEART RATE: 62 BPM | TEMPERATURE: 98 F | DIASTOLIC BLOOD PRESSURE: 60 MMHG

## 2018-07-06 DIAGNOSIS — C34.91 BRONCHOGENIC CANCER OF RIGHT LUNG: Primary | ICD-10-CM

## 2018-07-06 PROCEDURE — 96413 CHEMO IV INFUSION 1 HR: CPT | Mod: PN

## 2018-07-06 PROCEDURE — 25000003 PHARM REV CODE 250: Mod: PN | Performed by: NURSE PRACTITIONER

## 2018-07-06 PROCEDURE — A4216 STERILE WATER/SALINE, 10 ML: HCPCS | Mod: PN | Performed by: NURSE PRACTITIONER

## 2018-07-06 PROCEDURE — 63600175 PHARM REV CODE 636 W HCPCS: Mod: TB,PN | Performed by: NURSE PRACTITIONER

## 2018-07-06 RX ORDER — SODIUM CHLORIDE 0.9 % (FLUSH) 0.9 %
10 SYRINGE (ML) INJECTION
Status: DISCONTINUED | OUTPATIENT
Start: 2018-07-06 | End: 2018-07-06 | Stop reason: HOSPADM

## 2018-07-06 RX ADMIN — SODIUM CHLORIDE: 9 INJECTION, SOLUTION INTRAVENOUS at 03:07

## 2018-07-06 RX ADMIN — Medication 10 ML: at 04:07

## 2018-07-06 RX ADMIN — Medication 10 ML: at 03:07

## 2018-07-06 RX ADMIN — SODIUM CHLORIDE 200 MG: 9 INJECTION, SOLUTION INTRAVENOUS at 03:07

## 2018-07-06 NOTE — TELEPHONE ENCOUNTER
----- Message from Denise Mccarty sent at 7/6/2018 10:06 AM CDT -----  Contact: pt#[phone]  Patient Returning Call from [facility]    Who Left Message for Patient:Yee  Communication Preference:callback   Additional Information:

## 2018-07-06 NOTE — TELEPHONE ENCOUNTER
Spoke with patient. EGD scheduled for 7/20 at 10a. Reviewed prep instructions. Mr Montiel verbalized understanding.

## 2018-07-06 NOTE — PLAN OF CARE
Problem: Patient Care Overview  Goal: Plan of Care Review  Outcome: Ongoing (interventions implemented as appropriate)  Adequate for discharge.   Pt tolerated chemo infusion without noted distress.  Reviewed upcoming appointments.  All questions answered. Advised to call MD with any questions or concerns.   Ambulated from infusion center independently by self.

## 2018-07-10 ENCOUNTER — TELEPHONE (OUTPATIENT)
Dept: ENDOSCOPY | Facility: HOSPITAL | Age: 68
End: 2018-07-10

## 2018-07-20 ENCOUNTER — SURGERY (OUTPATIENT)
Age: 68
End: 2018-07-20

## 2018-07-20 ENCOUNTER — ANESTHESIA EVENT (OUTPATIENT)
Dept: ENDOSCOPY | Facility: HOSPITAL | Age: 68
End: 2018-07-20
Payer: MEDICARE

## 2018-07-20 ENCOUNTER — ANESTHESIA (OUTPATIENT)
Dept: ENDOSCOPY | Facility: HOSPITAL | Age: 68
End: 2018-07-20
Payer: MEDICARE

## 2018-07-20 ENCOUNTER — HOSPITAL ENCOUNTER (OUTPATIENT)
Facility: HOSPITAL | Age: 68
Discharge: HOME | End: 2018-07-20
Attending: INTERNAL MEDICINE | Admitting: INTERNAL MEDICINE
Payer: MEDICARE

## 2018-07-20 VITALS
DIASTOLIC BLOOD PRESSURE: 60 MMHG | HEART RATE: 60 BPM | BODY MASS INDEX: 19.93 KG/M2 | HEIGHT: 66 IN | RESPIRATION RATE: 16 BRPM | WEIGHT: 124 LBS | OXYGEN SATURATION: 95 % | TEMPERATURE: 98 F | SYSTOLIC BLOOD PRESSURE: 125 MMHG

## 2018-07-20 DIAGNOSIS — K22.2 ESOPHAGEAL STRICTURE: Primary | ICD-10-CM

## 2018-07-20 PROCEDURE — 63600175 PHARM REV CODE 636 W HCPCS: Performed by: NURSE ANESTHETIST, CERTIFIED REGISTERED

## 2018-07-20 PROCEDURE — 74360 X-RAY GUIDE GI DILATION: CPT | Mod: 26,,, | Performed by: INTERNAL MEDICINE

## 2018-07-20 PROCEDURE — 25000003 PHARM REV CODE 250: Performed by: INTERNAL MEDICINE

## 2018-07-20 PROCEDURE — C1769 GUIDE WIRE: HCPCS | Performed by: INTERNAL MEDICINE

## 2018-07-20 PROCEDURE — 43247 EGD REMOVE FOREIGN BODY: CPT | Performed by: INTERNAL MEDICINE

## 2018-07-20 PROCEDURE — 25000003 PHARM REV CODE 250: Performed by: NURSE ANESTHETIST, CERTIFIED REGISTERED

## 2018-07-20 PROCEDURE — 43247 EGD REMOVE FOREIGN BODY: CPT | Mod: 51,,, | Performed by: INTERNAL MEDICINE

## 2018-07-20 PROCEDURE — 43266 EGD ENDOSCOPIC STENT PLACE: CPT | Performed by: INTERNAL MEDICINE

## 2018-07-20 PROCEDURE — C1874 STENT, COATED/COV W/DEL SYS: HCPCS | Performed by: INTERNAL MEDICINE

## 2018-07-20 PROCEDURE — 43266 EGD ENDOSCOPIC STENT PLACE: CPT | Mod: ,,, | Performed by: INTERNAL MEDICINE

## 2018-07-20 PROCEDURE — 37000008 HC ANESTHESIA 1ST 15 MINUTES: Performed by: INTERNAL MEDICINE

## 2018-07-20 PROCEDURE — 37000009 HC ANESTHESIA EA ADD 15 MINS: Performed by: INTERNAL MEDICINE

## 2018-07-20 PROCEDURE — D9220A PRA ANESTHESIA: Mod: ANES,,, | Performed by: ANESTHESIOLOGY

## 2018-07-20 PROCEDURE — D9220A PRA ANESTHESIA: Mod: CRNA,,, | Performed by: NURSE ANESTHETIST, CERTIFIED REGISTERED

## 2018-07-20 DEVICE — IMPLANTABLE DEVICE
Type: IMPLANTABLE DEVICE | Site: ESOPHAGUS | Status: FUNCTIONAL
Brand: ALIMAXX-ES™

## 2018-07-20 RX ORDER — PHENYLEPHRINE HYDROCHLORIDE 10 MG/ML
INJECTION INTRAVENOUS
Status: DISCONTINUED | OUTPATIENT
Start: 2018-07-20 | End: 2018-07-20

## 2018-07-20 RX ORDER — SODIUM CHLORIDE 9 MG/ML
INJECTION, SOLUTION INTRAVENOUS CONTINUOUS
Status: DISCONTINUED | OUTPATIENT
Start: 2018-07-20 | End: 2018-07-20 | Stop reason: HOSPADM

## 2018-07-20 RX ORDER — LIDOCAINE HCL/PF 100 MG/5ML
SYRINGE (ML) INTRAVENOUS
Status: DISCONTINUED | OUTPATIENT
Start: 2018-07-20 | End: 2018-07-20

## 2018-07-20 RX ORDER — PROPOFOL 10 MG/ML
VIAL (ML) INTRAVENOUS CONTINUOUS PRN
Status: DISCONTINUED | OUTPATIENT
Start: 2018-07-20 | End: 2018-07-20

## 2018-07-20 RX ORDER — SUCCINYLCHOLINE CHLORIDE 20 MG/ML
INJECTION INTRAMUSCULAR; INTRAVENOUS
Status: DISCONTINUED | OUTPATIENT
Start: 2018-07-20 | End: 2018-07-20

## 2018-07-20 RX ORDER — GLYCOPYRROLATE 0.2 MG/ML
INJECTION INTRAMUSCULAR; INTRAVENOUS
Status: DISCONTINUED | OUTPATIENT
Start: 2018-07-20 | End: 2018-07-20

## 2018-07-20 RX ORDER — SODIUM CHLORIDE 0.9 % (FLUSH) 0.9 %
3 SYRINGE (ML) INJECTION
Status: DISCONTINUED | OUTPATIENT
Start: 2018-07-20 | End: 2018-07-20 | Stop reason: HOSPADM

## 2018-07-20 RX ORDER — FENTANYL CITRATE 50 UG/ML
INJECTION, SOLUTION INTRAMUSCULAR; INTRAVENOUS
Status: DISCONTINUED | OUTPATIENT
Start: 2018-07-20 | End: 2018-07-20

## 2018-07-20 RX ORDER — PROPOFOL 10 MG/ML
VIAL (ML) INTRAVENOUS
Status: DISCONTINUED | OUTPATIENT
Start: 2018-07-20 | End: 2018-07-20

## 2018-07-20 RX ADMIN — LIDOCAINE HYDROCHLORIDE 100 MG: 20 INJECTION, SOLUTION INTRAVENOUS at 11:07

## 2018-07-20 RX ADMIN — FENTANYL CITRATE 50 MCG: 50 INJECTION, SOLUTION INTRAMUSCULAR; INTRAVENOUS at 11:07

## 2018-07-20 RX ADMIN — PROPOFOL 200 MCG/KG/MIN: 10 INJECTION, EMULSION INTRAVENOUS at 11:07

## 2018-07-20 RX ADMIN — GLYCOPYRROLATE 0.2 MG: 0.2 INJECTION, SOLUTION INTRAMUSCULAR; INTRAVENOUS at 10:07

## 2018-07-20 RX ADMIN — PHENYLEPHRINE HYDROCHLORIDE 200 MCG: 10 INJECTION INTRAVENOUS at 11:07

## 2018-07-20 RX ADMIN — PROPOFOL 150 MG: 10 INJECTION, EMULSION INTRAVENOUS at 11:07

## 2018-07-20 RX ADMIN — PHENYLEPHRINE HYDROCHLORIDE 100 MCG: 10 INJECTION INTRAVENOUS at 11:07

## 2018-07-20 RX ADMIN — SUCCINYLCHOLINE CHLORIDE 140 MG: 20 INJECTION, SOLUTION INTRAMUSCULAR; INTRAVENOUS at 11:07

## 2018-07-20 RX ADMIN — SODIUM CHLORIDE: 0.9 INJECTION, SOLUTION INTRAVENOUS at 10:07

## 2018-07-20 NOTE — TRANSFER OF CARE
"Anesthesia Transfer of Care Note    Patient: Ismael Montiel    Procedure(s) Performed: Procedure(s) (LRB):  EGD (ESOPHAGOGASTRODUODENOSCOPY) (N/A)    Patient location: [facility]    Anesthesia Type: general    Transport from OR: Transported from OR on 100% O2 by closed face mask with adequate spontaneous ventilation    Post pain: adequate analgesia    Post assessment: no apparent anesthetic complications    Post vital signs: stable    Level of consciousness: sedated    Nausea/Vomiting: no nausea/vomiting    Complications: none          Last vitals:   Visit Vitals  BP (!) 116/57   Pulse 70   Temp 36.7 °C (98.1 °F) (Temporal)   Resp 18   Ht 5' 6" (1.676 m)   Wt 56.2 kg (124 lb)   SpO2 100%   BMI 20.01 kg/m²     "

## 2018-07-20 NOTE — PLAN OF CARE
Pt is AAOx4. VSS. NAD. IV discontinued. Discharge instructions given, verbalized understanding. Denies pain or nausea. Discharged home with family.

## 2018-07-20 NOTE — [employer] PATIENT INSTRUCTIONS
Discharge Summary/Instructions after an Endoscopic Procedure  Patient Name: Ismael Montiel  Patient MRN: [id number]  Patient YOB: 1950 Friday, July 20, 2018  Kandy Mayfield MD  RESTRICTIONS:  During your procedure today, you received medications for sedation.  These   medications may affect your judgment, balance and coordination.  Therefore,   for 24 hours, you have the following restrictions:   - DO NOT drive a car, operate machinery, make legal/financial decisions,   sign important papers or drink alcohol.    ACTIVITY:  Today: no heavy lifting, straining or running due to procedural   sedation/anesthesia.  The following day: return to full activity including work.  DIET:  Eat and drink normally unless instructed otherwise.     TREATMENT FOR COMMON SIDE EFFECTS:  - Mild abdominal pain, nausea, belching, bloating or excessive gas:  rest,   eat lightly and use a heating pad.  - Sore Throat: treat with throat lozenges and/or gargle with warm salt   water.  - Because air was used during the procedure, expelling large amounts of air   from your rectum or belching is normal.  - If a bowel prep was taken, you may not have a bowel movement for 1-3 days.    This is normal.  SYMPTOMS TO WATCH FOR AND REPORT TO YOUR PHYSICIAN:  1. Abdominal pain or bloating, other than gas cramps.  2. Chest pain.  3. Back pain.  4. Signs of infection such as: chills or fever occurring within 24 hours   after the procedure.  5. Rectal bleeding, which would show as bright red, maroon, or black stools.   (A tablespoon of blood from the rectum is not serious, especially if   hemorrhoids are present.)  6. Vomiting.  7. Weakness or dizziness.  GO DIRECTLY TO THE NEAREST EMERGENCY ROOM IF YOU HAVE ANY OF THE FOLLOWING:      Difficulty breathing              Chills and/or fever over 101 F   Persistent vomiting and/or vomiting blood   Severe abdominal pain   Severe chest pain   Black, tarry stools   Bleeding- more than one  tablespoon   Any other symptom or condition that you feel may need urgent attention  Your doctor recommends these additional instructions:  If any biopsies were taken, your doctors clinic will contact you in 1 to 2   weeks with any results.  - Discharge patient to home.   - Full liquid diet.   - Continue present medications.   - Return to referring physician.   - Repeat upper endoscopy in 6 weeks for retreatment. Management is dependent   on malignancy progression. I may just suture this stent in place next time   if patient tolerating the stent well.  For questions, problems or results please call your physician - Kandy Mayfield MD at Work:  [phone].  [facility], EMERGENCY ROOM PHONE NUMBER: [phone]  IF A COMPLICATION OR EMERGENCY SITUATION ARISES AND YOU ARE UNABLE TO REACH   YOUR PHYSICIAN - GO DIRECTLY TO THE EMERGENCY ROOM.  Kandy Mayfield MD  7/20/2018 12:16:24 PM  This report has been verified and signed electronically.  [employer]

## 2018-07-20 NOTE — DISCHARGE INSTRUCTIONS
Upper GI Endoscopy     During endoscopy, a long, flexible tube is used to view the inside of your upper GI tract.      Upper GI endoscopy allows your healthcare provider to look directly into the beginning of your gastrointestinal (GI) tract. The esophagus, stomach, and duodenum (the first part of the small intestine) make up the upper GI tract.   Before the exam  Follow these and any other instructions you are given before your endoscopy. If you dont follow the healthcare providers instructions carefully, the test may need to be canceled or done over:  · Don't eat or drink anything after midnight the night before your exam. If your exam is in the afternoon, drink only clear liquids in the morning. Don't eat or drink anything for 8 hours before the exam. In some cases, you may be able to take medicines with sips of water until 2 hours before the procedure. Speak with your healthcare provider about this.   · Bring your X-rays and any other test results you have.  · Because you will be sedated, arrange for an adult to drive you home after the exam.  · Tell your healthcare provider before the exam if you are taking any medicines or have any medical problems.  The procedure  Here is what to expect:  · You will lie on the endoscopy table. Usually patients lie on the left side.  · You will be monitored and given oxygen.  · Your throat may be numbed with a spray or gargle. You are given medicine through an intravenous (IV) line that will help you relax and remain comfortable. You may be awake or asleep during the procedure.  · The healthcare provider will put the endoscope in your mouth and down your esophagus. It is thinner than most pieces of food that you swallow. It will not affect your breathing. The medicine helps keep you from gagging.  · Air is put into your GI tract to expand it. It can make you burp.  · During the procedure, the healthcare provider can take biopsies (tissue samples), remove abnormalities,  such as polyps, or treat abnormalities through a variety of devices placed through the endoscope. You will not feel this.   · The endoscope carries images of your upper GI tract to a video screen. If you are awake, you may be able to look at the images.  · After the procedure is done, you will rest for a time. An adult must drive you home.  When to call your healthcare provider  Contact your healthcare provider if you have:  · Black or tarry stools, or blood in your stool  · Fever  · Pain in your belly that does not go away  · Nausea and vomiting, or vomiting blood     PATIENT INSTRUCTIONS  POST-ANESTHESIA    IMMEDIATELY FOLLOWING SURGERY:  Do not drive or operate machinery for the first twenty four hours after surgery.  Do not make any important decisions for twenty four hours after surgery or while taking narcotic pain medications or sedatives.  If you develop intractable nausea and vomiting or a severe headache please notify your doctor immediately.    FOLLOW-UP:  Please make an appointment with your surgeon as instructed. You do not need to follow up with anesthesia unless specifically instructed to do so.    WOUND CARE INSTRUCTIONS (if applicable):  Keep a dry clean dressing on the anesthesia/puncture wound site if there is drainage.  Once the wound has quit draining you may leave it open to air.  Generally you should leave the bandage intact for twenty four hours unless there is drainage.  If the epidural site drains for more than 36-48 hours please call the anesthesia department.    QUESTIONS?:  Please feel free to call your physician or the hospital  if you have any questions, and they will be happy to assist you.       [facility] Anesthesia Department  [address] [facility]  [phone]

## 2018-07-20 NOTE — ANESTHESIA POSTPROCEDURE EVALUATION
"Anesthesia Post Evaluation    Patient: Ismael Montiel    Procedure(s) Performed: Procedure(s) (LRB):  EGD (ESOPHAGOGASTRODUODENOSCOPY) (N/A)    Final Anesthesia Type: general  Patient location during evaluation: [facility]  Patient participation: Yes- Able to Participate  Level of consciousness: awake and alert and oriented  Post-procedure vital signs: reviewed and stable  Pain management: adequate  Airway patency: patent  PONV status at discharge: No PONV  Anesthetic complications: no      Cardiovascular status: hemodynamically stable  Respiratory status: unassisted  Hydration status: euvolemic  Follow-up not needed.        Visit Vitals  /60 (BP Location: Left arm, Patient Position: Lying)   Pulse 60   Temp 36.7 °C (98.1 °F) (Temporal)   Resp 16   Ht 5' 6" (1.676 m)   Wt 56.2 kg (124 lb)   SpO2 95%   BMI 20.01 kg/m²       Pain/Lainey Score: Pain Assessment Performed: Yes (7/20/2018  1:12 PM)  Presence of Pain: complains of pain/discomfort (not new pain) (7/20/2018  1:12 PM)  Lainey Score: 10 (7/20/2018  1:12 PM)  Modified Laniey Score: 19 (7/20/2018 12:40 PM)      "

## 2018-07-20 NOTE — ANESTHESIA PREPROCEDURE EVALUATION
07/20/2018  Pre-operative evaluation for Procedure(s) (LRB):  EGD (ESOPHAGOGASTRODUODENOSCOPY) (N/A)    Ismael Montiel is a 67 y.o. male pt reports regurgitation necessitating intubation during last EGD.     Patient Active Problem List   Diagnosis    CAD (coronary artery disease)    S/P CABG x 5    Dysphagia    Bronchogenic cancer of right lung    Diverticulitis of large intestine without perforation or abscess without bleeding    Abdominal pain    Bacteremia due to Clostridium septicum    Leukocytosis    Moderate malnutrition    Infection due to Clostridium septicum    Aspiration pneumonia    Esophageal stricture       Review of patient's allergies indicates:  No Known Allergies    No current facility-administered medications on file prior to encounter.      Current Outpatient Prescriptions on File Prior to Encounter   Medication Sig Dispense Refill    0.9 % SODIUM CHLORIDE (NORMAL SALINE FLUSH INJ) Inject 10 mLs into the vein As instructed. Prior to and after IV ampicillin      albuterol 90 mcg/actuation inhaler Inhale 2 puffs into the lungs every 4 (four) hours as needed for Wheezing or Shortness of Breath. Rescue 1 Inhaler 0    aluminum hydrox-magnesium carb (GAVISCON EXTRA STRENGTH) 160-105 mg Chew Take 1 tablet by mouth 2 (two) times daily as needed.      amlodipine (NORVASC) 5 MG tablet Take 2.5 mg by mouth once daily.      benzonatate (TESSALON PERLES) 100 MG capsule Take 1 capsule (100 mg total) by mouth every 6 (six) hours as needed for Cough. 30 capsule 1    diphenhydrAMINE (BENADRYL) 50 mg/mL injection Inject 50 mg into the muscle As instructed for Allergies (allergic reaction). 25-50mg IM/IV one dose only as needed for anaphylactic reaction      EPINEPHrine (EPIPEN) 0.3 mg/0.3 mL AtIn Inject 0.3 each into the skin once as needed (allergic reaction). take 0.3mg into the  skin or muscle x 1 dose only as needed for allergic reaction      HEPARIN SOD,PORCINE/0.9 % NACL (HEPARIN FLUSH IV) Inject 5 mLs into the vein As instructed. following NS as flush for midline      hydrocodone-chlorpheniramine (TUSSIONEX) 10-8 mg/5 mL suspension 5 mLs by Per G Tube route 2 (two) times daily as needed for Cough. 473 mL 0    L.acidophil,parac-S.therm-Bif. (RISAQUAD) Cap capsule Take 1 capsule by mouth once daily.      lactulose (CHRONULAC) 10 gram/15 mL solution Take 10 g by mouth 2 (two) times daily.       metoclopramide HCl (REGLAN) 10 MG tablet Take 10 mg by mouth every 12 (twelve) hours.      nebivolol (BYSTOLIC) 5 MG Tab Take 5 mg by mouth once daily.      nutritional supplements (OSMOLITE 1.2 HARRY) 0.06 gram-1.2 kcal/mL Liqd 1.5 Cans by Gastrostomy Tube route 4 (four) times daily. 1500 mL 10    ondansetron (ZOFRAN) 8 MG tablet Take 1 tablet (8 mg total) by mouth every 8 (eight) hours as needed for Nausea. 30 tablet 2    pantoprazole (PROTONIX) 40 MG tablet Take 1 tablet (40 mg total) by mouth once daily. 30 tablet 1    peg-electrolyte soln 420 gram SolR TK 4000 ML PO ONCE  0    prochlorperazine (COMPAZINE) 10 MG tablet Take 1 tablet (10 mg total) by mouth every 6 (six) hours as needed (nausea and vomitting.  Rotate with Zofran if needed.). 30 tablet 1       Past Surgical History:   Procedure Laterality Date    CARDIAC SURGERY  2005    5 vessel     CORONARY ARTERY BYPASS GRAFT      X 5    CORONARY STENT PLACEMENT      X 1    ESOPHAGOGASTRODUODENOSCOPY N/A 6/8/2018    Procedure: EGD (ESOPHAGOGASTRODUODENOSCOPY);  Surgeon: Kandy Mayfield MD;  Location: [facility] ([address] [place] [address]);  Service: Endoscopy;  Laterality: N/A;  Need EGD with dilation for esophageal stricture.     ESOPHAGOGASTRODUODENOSCOPY N/A 6/29/2018    Procedure: EGD (ESOPHAGOGASTRODUODENOSCOPY);  Surgeon: Kandy Mayfield MD;  Location: [facility] (2ND FLR);  Service: Endoscopy;  Laterality: N/A;    GASTROSTOMY TUBE  PLACEMENT  10/09/2017    HEEL SPUR SURGERY Left        Social History     Social History    Marital status:      Spouse name: N/A    Number of children: N/A    Years of education: N/A     Occupational History     Exterior Building     Social History Main Topics    Smoking status: Current Every Day Smoker     Packs/day: 0.25     Years: 50.00     Types: Cigarettes     Start date: 1964    Smokeless tobacco: Never Used    Alcohol use No      Comment:  none in 8 months    Drug use: No    Sexual activity: Yes     Partners: Female     Other Topics Concern    Not on file     Social History Narrative    Works in [employer]         CBC: No results for input(s): WBC, RBC, HGB, HCT, PLT, MCV, MCH, MCHC in the last 72 hours.    CMP: No results for input(s): NA, K, CL, CO2, BUN, CREATININE, GLU, MG, PHOS, CALCIUM, ALBUMIN, PROT, ALKPHOS, ALT, AST, BILITOT in the last 72 hours.    INR  No results for input(s): PT, INR, PROTIME, APTT in the last 72 hours.        Diagnostic Studies:      EKD Echo:  No results found for this or any previous visit.      Anesthesia Evaluation    I have reviewed the Patient Summary Reports.     I have reviewed the Medications.     Review of Systems  Anesthesia Hx:  History of prior surgery of interest to airway management or planning: Denies Family Hx of Anesthesia complications.   Denies Personal Hx of Anesthesia complications.       Physical Exam  General:  Well nourished    Airway/Jaw/Neck:  Airway Findings: Mouth Opening: Normal Tongue: Normal  General Airway Assessment: Adult  Mallampati: II  TM Distance: Normal, at least 6 cm  Jaw/Neck Findings:  Neck ROM: Normal ROM      Dental:  Dental Findings: In tact   Chest/Lungs:  Chest/Lungs Findings: Clear to auscultation, Normal Respiratory Rate         Mental Status:  Mental Status Findings:  Cooperative, Alert and Oriented         Anesthesia Plan  Type of Anesthesia, risks & benefits discussed:  Anesthesia  Type:  general  Patient's Preference:   Intra-op Monitoring Plan: standard ASA monitors  Intra-op Monitoring Plan Comments:   Post Op Pain Control Plan: multimodal analgesia  Post Op Pain Control Plan Comments:   Induction:   IV  Beta Blocker:  Patient is not currently on a Beta-Blocker (No further documentation required).       Informed Consent: Patient understands risks and agrees with Anesthesia plan.  Questions answered. Anesthesia consent signed with patient.  ASA Score: 3     Day of Surgery Review of History & Physical:    H&P update referred to the surgeon.         Ready For Surgery From Anesthesia Perspective.

## 2018-07-24 ENCOUNTER — TELEPHONE (OUTPATIENT)
Dept: GASTROENTEROLOGY | Facility: CLINIC | Age: 68
End: 2018-07-24

## 2018-07-24 DIAGNOSIS — R13.10 DYSPHAGIA, UNSPECIFIED TYPE: Primary | ICD-10-CM

## 2018-07-27 PROBLEM — R04.2 HEMOPTYSIS: Status: ACTIVE | Noted: 2018-07-27

## 2018-07-30 ENCOUNTER — TELEPHONE (OUTPATIENT)
Dept: GASTROENTEROLOGY | Facility: CLINIC | Age: 68
End: 2018-07-30

## 2018-07-30 NOTE — TELEPHONE ENCOUNTER
Spoke with patient. EGD scheduled for 9/14 at 8:30a. Reviewed prep instructions. Mr Montiel verbalized understanding.

## 2018-08-06 ENCOUNTER — LAB VISIT (OUTPATIENT)
Dept: LAB | Facility: HOSPITAL | Age: 68
End: 2018-08-06
Attending: PHYSICIAN ASSISTANT
Payer: MEDICARE

## 2018-08-06 DIAGNOSIS — E53.8 VITAMIN B12 DEFICIENCY: ICD-10-CM

## 2018-08-06 LAB — VIT B12 SERPL-MCNC: 993 PG/ML

## 2018-08-06 PROCEDURE — 82607 VITAMIN B-12: CPT

## 2018-08-06 PROCEDURE — 36415 COLL VENOUS BLD VENIPUNCTURE: CPT | Mod: PN

## 2018-08-06 PROCEDURE — 82607 VITAMIN B-12: CPT | Mod: PN

## 2018-08-17 PROBLEM — R05.9 COUGH: Status: ACTIVE | Noted: 2018-08-17

## 2018-08-17 PROBLEM — D63.8 ANEMIA, CHRONIC DISEASE: Status: ACTIVE | Noted: 2018-08-17

## 2018-08-17 PROBLEM — I47.10 PAROXYSMAL SVT (SUPRAVENTRICULAR TACHYCARDIA): Status: ACTIVE | Noted: 2018-08-17

## 2018-08-22 PROBLEM — B96.5 PSEUDOMONAS RESPIRATORY INFECTION: Status: ACTIVE | Noted: 2018-08-22

## 2018-08-22 PROBLEM — J98.8 PSEUDOMONAS RESPIRATORY INFECTION: Status: ACTIVE | Noted: 2018-08-22

## 2018-08-24 ENCOUNTER — HOSPITAL ENCOUNTER (INPATIENT)
Facility: HOSPITAL | Age: 68
LOS: 7 days | Discharge: HOME HEALTH | DRG: 177 | End: 2018-08-31
Attending: HOSPITALIST | Admitting: HOSPITALIST
Payer: MEDICARE

## 2018-08-24 ENCOUNTER — ANESTHESIA EVENT (OUTPATIENT)
Dept: ENDOSCOPY | Facility: HOSPITAL | Age: 68
DRG: 177 | End: 2018-08-24
Payer: MEDICARE

## 2018-08-24 DIAGNOSIS — K22.2 ESOPHAGEAL STRICTURE: ICD-10-CM

## 2018-08-24 DIAGNOSIS — G89.3 CHRONIC PAIN DUE TO NEOPLASM: ICD-10-CM

## 2018-08-24 DIAGNOSIS — I25.10 CORONARY ARTERY DISEASE INVOLVING NATIVE CORONARY ARTERY OF NATIVE HEART WITHOUT ANGINA PECTORIS: ICD-10-CM

## 2018-08-24 DIAGNOSIS — Z66 DNR (DO NOT RESUSCITATE): ICD-10-CM

## 2018-08-24 DIAGNOSIS — R13.19 ESOPHAGEAL DYSPHAGIA: Primary | ICD-10-CM

## 2018-08-24 DIAGNOSIS — B96.5 PSEUDOMONAS RESPIRATORY INFECTION: ICD-10-CM

## 2018-08-24 DIAGNOSIS — J96.21 ACUTE ON CHRONIC RESPIRATORY FAILURE WITH HYPOXIA: ICD-10-CM

## 2018-08-24 DIAGNOSIS — E43 SEVERE PROTEIN-CALORIE MALNUTRITION: ICD-10-CM

## 2018-08-24 DIAGNOSIS — B37.0 ORAL THRUSH: ICD-10-CM

## 2018-08-24 DIAGNOSIS — J69.0 ASPIRATION PNEUMONIA: ICD-10-CM

## 2018-08-24 DIAGNOSIS — D63.8 ANEMIA, CHRONIC DISEASE: ICD-10-CM

## 2018-08-24 DIAGNOSIS — J69.0 ASPIRATION PNEUMONIA OF RIGHT LOWER LOBE DUE TO GASTRIC SECRETIONS: ICD-10-CM

## 2018-08-24 DIAGNOSIS — K31.1 GASTRIC OUTLET OBSTRUCTION: ICD-10-CM

## 2018-08-24 DIAGNOSIS — J69.0 ASPIRATION PNEUMONIA OF RIGHT LOWER LOBE, UNSPECIFIED ASPIRATION PNEUMONIA TYPE: ICD-10-CM

## 2018-08-24 DIAGNOSIS — C79.51 LUNG CANCER METASTATIC TO BONE: ICD-10-CM

## 2018-08-24 DIAGNOSIS — C34.91 BRONCHOGENIC CANCER OF RIGHT LUNG: ICD-10-CM

## 2018-08-24 DIAGNOSIS — E43 SEVERE MALNUTRITION: ICD-10-CM

## 2018-08-24 DIAGNOSIS — C34.90 LUNG CANCER METASTATIC TO BONE: ICD-10-CM

## 2018-08-24 DIAGNOSIS — J96.11 CHRONIC RESPIRATORY FAILURE WITH HYPOXIA: ICD-10-CM

## 2018-08-24 DIAGNOSIS — Z95.1 S/P CABG X 5: ICD-10-CM

## 2018-08-24 DIAGNOSIS — J98.8 PSEUDOMONAS RESPIRATORY INFECTION: ICD-10-CM

## 2018-08-24 DIAGNOSIS — K31.1 PYLORIC STENOSIS: ICD-10-CM

## 2018-08-24 DIAGNOSIS — R53.1 WEAKNESS GENERALIZED: ICD-10-CM

## 2018-08-24 PROBLEM — K21.00 GASTROESOPHAGEAL REFLUX DISEASE WITH ESOPHAGITIS: Status: ACTIVE | Noted: 2018-08-24

## 2018-08-24 PROBLEM — K59.00 CONSTIPATION: Status: ACTIVE | Noted: 2018-08-24

## 2018-08-24 LAB
ABO + RH BLD: NORMAL
ANISOCYTOSIS BLD QL SMEAR: SLIGHT
BASOPHILS NFR BLD: 0 %
BLD GP AB SCN CELLS X3 SERPL QL: NORMAL
DIFFERENTIAL METHOD: ABNORMAL
EOSINOPHIL NFR BLD: 0 %
ERYTHROCYTE [DISTWIDTH] IN BLOOD BY AUTOMATED COUNT: 16.3 %
ESTIMATED AVG GLUCOSE: 123 MG/DL
GIANT PLATELETS BLD QL SMEAR: PRESENT
HBA1C MFR BLD HPLC: 5.9 %
HCT VFR BLD AUTO: 25.1 %
HGB BLD-MCNC: 7.7 G/DL
IMM GRANULOCYTES # BLD AUTO: ABNORMAL K/UL
IMM GRANULOCYTES NFR BLD AUTO: ABNORMAL %
LYMPHOCYTES NFR BLD: 4 %
MCH RBC QN AUTO: 25.4 PG
MCHC RBC AUTO-ENTMCNC: 30.7 G/DL
MCV RBC AUTO: 83 FL
METAMYELOCYTES NFR BLD MANUAL: 1 %
MONOCYTES NFR BLD: 6 %
MYELOCYTES NFR BLD MANUAL: 2 %
NEUTROPHILS NFR BLD: 85 %
NEUTS BAND NFR BLD MANUAL: 2 %
NRBC BLD-RTO: 0 /100 WBC
OVALOCYTES BLD QL SMEAR: ABNORMAL
PLATELET # BLD AUTO: 635 K/UL
PMV BLD AUTO: 9.5 FL
POCT GLUCOSE: 161 MG/DL (ref 70–110)
POCT GLUCOSE: 208 MG/DL (ref 70–110)
POCT GLUCOSE: 238 MG/DL (ref 70–110)
POCT GLUCOSE: 273 MG/DL (ref 70–110)
POIKILOCYTOSIS BLD QL SMEAR: SLIGHT
POLYCHROMASIA BLD QL SMEAR: ABNORMAL
PREALB SERPL-MCNC: 16 MG/DL
RBC # BLD AUTO: 3.03 M/UL
WBC # BLD AUTO: 22.25 K/UL

## 2018-08-24 PROCEDURE — 85007 BL SMEAR W/DIFF WBC COUNT: CPT

## 2018-08-24 PROCEDURE — 87205 SMEAR GRAM STAIN: CPT

## 2018-08-24 PROCEDURE — 27100171 HC OXYGEN HIGH FLOW UP TO 24 HOURS

## 2018-08-24 PROCEDURE — 11000001 HC ACUTE MED/SURG PRIVATE ROOM

## 2018-08-24 PROCEDURE — 94761 N-INVAS EAR/PLS OXIMETRY MLT: CPT

## 2018-08-24 PROCEDURE — 86901 BLOOD TYPING SEROLOGIC RH(D): CPT

## 2018-08-24 PROCEDURE — 36415 COLL VENOUS BLD VENIPUNCTURE: CPT

## 2018-08-24 PROCEDURE — 83036 HEMOGLOBIN GLYCOSYLATED A1C: CPT

## 2018-08-24 PROCEDURE — C9113 INJ PANTOPRAZOLE SODIUM, VIA: HCPCS | Performed by: HOSPITALIST

## 2018-08-24 PROCEDURE — 99222 1ST HOSP IP/OBS MODERATE 55: CPT | Mod: GC,,, | Performed by: INTERNAL MEDICINE

## 2018-08-24 PROCEDURE — 87040 BLOOD CULTURE FOR BACTERIA: CPT | Mod: 59

## 2018-08-24 PROCEDURE — 63600175 PHARM REV CODE 636 W HCPCS: Performed by: HOSPITALIST

## 2018-08-24 PROCEDURE — 25000242 PHARM REV CODE 250 ALT 637 W/ HCPCS: Performed by: HOSPITALIST

## 2018-08-24 PROCEDURE — 94640 AIRWAY INHALATION TREATMENT: CPT

## 2018-08-24 PROCEDURE — 87186 SC STD MICRODIL/AGAR DIL: CPT

## 2018-08-24 PROCEDURE — 87077 CULTURE AEROBIC IDENTIFY: CPT

## 2018-08-24 PROCEDURE — B4185 PARENTERAL SOL 10 GM LIPIDS: HCPCS | Performed by: HOSPITALIST

## 2018-08-24 PROCEDURE — 99223 1ST HOSP IP/OBS HIGH 75: CPT | Mod: AI,GC,, | Performed by: HOSPITALIST

## 2018-08-24 PROCEDURE — 85027 COMPLETE CBC AUTOMATED: CPT

## 2018-08-24 PROCEDURE — 87070 CULTURE OTHR SPECIMN AEROBIC: CPT

## 2018-08-24 PROCEDURE — 25000003 PHARM REV CODE 250: Performed by: HOSPITALIST

## 2018-08-24 PROCEDURE — 84134 ASSAY OF PREALBUMIN: CPT

## 2018-08-24 RX ORDER — BISACODYL 10 MG
10 SUPPOSITORY, RECTAL RECTAL ONCE
Status: COMPLETED | OUTPATIENT
Start: 2018-08-24 | End: 2018-08-24

## 2018-08-24 RX ORDER — FLUCONAZOLE 2 MG/ML
100 INJECTION, SOLUTION INTRAVENOUS
Status: DISCONTINUED | OUTPATIENT
Start: 2018-08-24 | End: 2018-08-24

## 2018-08-24 RX ORDER — IPRATROPIUM BROMIDE AND ALBUTEROL SULFATE 2.5; .5 MG/3ML; MG/3ML
3 SOLUTION RESPIRATORY (INHALATION) 2 TIMES DAILY
Status: DISCONTINUED | OUTPATIENT
Start: 2018-08-24 | End: 2018-08-27

## 2018-08-24 RX ORDER — IBUPROFEN 200 MG
16 TABLET ORAL
Status: DISCONTINUED | OUTPATIENT
Start: 2018-08-24 | End: 2018-08-29

## 2018-08-24 RX ORDER — PANTOPRAZOLE SODIUM 40 MG/10ML
40 INJECTION, POWDER, LYOPHILIZED, FOR SOLUTION INTRAVENOUS DAILY
Status: DISCONTINUED | OUTPATIENT
Start: 2018-08-24 | End: 2018-08-24

## 2018-08-24 RX ORDER — BISACODYL 10 MG
10 SUPPOSITORY, RECTAL RECTAL DAILY PRN
Status: DISCONTINUED | OUTPATIENT
Start: 2018-08-24 | End: 2018-08-31 | Stop reason: HOSPADM

## 2018-08-24 RX ORDER — HYDROMORPHONE HYDROCHLORIDE 1 MG/ML
0.5 INJECTION, SOLUTION INTRAMUSCULAR; INTRAVENOUS; SUBCUTANEOUS EVERY 4 HOURS PRN
Status: DISCONTINUED | OUTPATIENT
Start: 2018-08-24 | End: 2018-08-27

## 2018-08-24 RX ORDER — FENTANYL 25 UG/1
1 PATCH TRANSDERMAL
Status: DISCONTINUED | OUTPATIENT
Start: 2018-08-24 | End: 2018-08-29

## 2018-08-24 RX ORDER — METOCLOPRAMIDE HYDROCHLORIDE 5 MG/ML
5 INJECTION INTRAMUSCULAR; INTRAVENOUS EVERY 8 HOURS
Status: DISCONTINUED | OUTPATIENT
Start: 2018-08-24 | End: 2018-08-27

## 2018-08-24 RX ORDER — SODIUM CHLORIDE 0.9 % (FLUSH) 0.9 %
5 SYRINGE (ML) INJECTION
Status: DISCONTINUED | OUTPATIENT
Start: 2018-08-24 | End: 2018-08-31 | Stop reason: HOSPADM

## 2018-08-24 RX ORDER — IBUPROFEN 200 MG
24 TABLET ORAL
Status: DISCONTINUED | OUTPATIENT
Start: 2018-08-24 | End: 2018-08-29

## 2018-08-24 RX ORDER — INSULIN ASPART 100 [IU]/ML
0-5 INJECTION, SOLUTION INTRAVENOUS; SUBCUTANEOUS EVERY 4 HOURS PRN
Status: DISCONTINUED | OUTPATIENT
Start: 2018-08-24 | End: 2018-08-26

## 2018-08-24 RX ORDER — IBUPROFEN 200 MG
16 TABLET ORAL
Status: DISCONTINUED | OUTPATIENT
Start: 2018-08-24 | End: 2018-08-31 | Stop reason: HOSPADM

## 2018-08-24 RX ORDER — GLUCAGON 1 MG
1 KIT INJECTION
Status: DISCONTINUED | OUTPATIENT
Start: 2018-08-24 | End: 2018-08-31 | Stop reason: HOSPADM

## 2018-08-24 RX ORDER — IBUPROFEN 200 MG
24 TABLET ORAL
Status: DISCONTINUED | OUTPATIENT
Start: 2018-08-24 | End: 2018-08-31 | Stop reason: HOSPADM

## 2018-08-24 RX ORDER — GLUCAGON 1 MG
1 KIT INJECTION
Status: DISCONTINUED | OUTPATIENT
Start: 2018-08-24 | End: 2018-08-29

## 2018-08-24 RX ORDER — IPRATROPIUM BROMIDE AND ALBUTEROL SULFATE 2.5; .5 MG/3ML; MG/3ML
3 SOLUTION RESPIRATORY (INHALATION) EVERY 4 HOURS PRN
Status: DISCONTINUED | OUTPATIENT
Start: 2018-08-24 | End: 2018-08-31 | Stop reason: HOSPADM

## 2018-08-24 RX ADMIN — FENTANYL 1 PATCH: 25 PATCH, EXTENDED RELEASE TRANSDERMAL at 09:08

## 2018-08-24 RX ADMIN — HYDROMORPHONE HYDROCHLORIDE 0.5 MG: 1 INJECTION, SOLUTION INTRAMUSCULAR; INTRAVENOUS; SUBCUTANEOUS at 09:08

## 2018-08-24 RX ADMIN — METHYLPREDNISOLONE SODIUM SUCCINATE 40 MG: 40 INJECTION, POWDER, FOR SOLUTION INTRAMUSCULAR; INTRAVENOUS at 09:08

## 2018-08-24 RX ADMIN — PIPERACILLIN AND TAZOBACTAM 4.5 G: 4; .5 INJECTION, POWDER, LYOPHILIZED, FOR SOLUTION INTRAVENOUS; PARENTERAL at 12:08

## 2018-08-24 RX ADMIN — DEXTROSE 40 MG: 50 INJECTION, SOLUTION INTRAVENOUS at 02:08

## 2018-08-24 RX ADMIN — HYDROMORPHONE HYDROCHLORIDE 0.5 MG: 1 INJECTION, SOLUTION INTRAMUSCULAR; INTRAVENOUS; SUBCUTANEOUS at 10:08

## 2018-08-24 RX ADMIN — BISACODYL 10 MG: 10 SUPPOSITORY RECTAL at 04:08

## 2018-08-24 RX ADMIN — METOCLOPRAMIDE 5 MG: 5 INJECTION, SOLUTION INTRAMUSCULAR; INTRAVENOUS at 05:08

## 2018-08-24 RX ADMIN — IPRATROPIUM BROMIDE AND ALBUTEROL SULFATE 3 ML: .5; 3 SOLUTION RESPIRATORY (INHALATION) at 08:08

## 2018-08-24 RX ADMIN — ASCORBIC ACID, VITAMIN A PALMITATE, CHOLECALCIFEROL, THIAMINE HYDROCHLORIDE, RIBOFLAVIN-5 PHOSPHATE SODIUM, PYRIDOXINE HYDROCHLORIDE, NIACINAMIDE, DEXPANTHENOL, ALPHA-TOCOPHEROL ACETATE, VITAMIN K1, FOLIC ACID, BIOTIN, CYANOCOBALAMIN: 200; 3300; 200; 6; 3.6; 6; 40; 15; 10; 150; 600; 60; 5 INJECTION, SOLUTION INTRAVENOUS at 07:08

## 2018-08-24 RX ADMIN — METOCLOPRAMIDE 5 MG: 5 INJECTION, SOLUTION INTRAMUSCULAR; INTRAVENOUS at 02:08

## 2018-08-24 RX ADMIN — INSULIN ASPART 3 UNITS: 100 INJECTION, SOLUTION INTRAVENOUS; SUBCUTANEOUS at 06:08

## 2018-08-24 RX ADMIN — INSULIN ASPART 1 UNITS: 100 INJECTION, SOLUTION INTRAVENOUS; SUBCUTANEOUS at 10:08

## 2018-08-24 RX ADMIN — SOYBEAN OIL 250 ML: 20 INJECTION, SOLUTION INTRAVENOUS at 09:08

## 2018-08-24 RX ADMIN — PIPERACILLIN AND TAZOBACTAM 4.5 G: 4; .5 INJECTION, POWDER, LYOPHILIZED, FOR SOLUTION INTRAVENOUS; PARENTERAL at 06:08

## 2018-08-24 RX ADMIN — HYDROMORPHONE HYDROCHLORIDE 0.5 MG: 1 INJECTION, SOLUTION INTRAMUSCULAR; INTRAVENOUS; SUBCUTANEOUS at 06:08

## 2018-08-24 RX ADMIN — METOCLOPRAMIDE 5 MG: 5 INJECTION, SOLUTION INTRAMUSCULAR; INTRAVENOUS at 10:08

## 2018-08-24 RX ADMIN — FLUCONAZOLE 100 MG: 2 INJECTION, SOLUTION INTRAVENOUS at 12:08

## 2018-08-24 RX ADMIN — HYDROMORPHONE HYDROCHLORIDE 0.5 MG: 1 INJECTION, SOLUTION INTRAMUSCULAR; INTRAVENOUS; SUBCUTANEOUS at 02:08

## 2018-08-24 RX ADMIN — IPRATROPIUM BROMIDE AND ALBUTEROL SULFATE 3 ML: .5; 3 SOLUTION RESPIRATORY (INHALATION) at 07:08

## 2018-08-24 NOTE — SUBJECTIVE & OBJECTIVE
Past Medical History:   Diagnosis Date    CAD (coronary artery disease) 2014    Cancer     esophagus    Esophageal stenosis     Hyperlipidemia     Hypertension     S/P CABG x 5 2005       Past Surgical History:   Procedure Laterality Date    CARDIAC SURGERY  2005    5 vessel     CORONARY ARTERY BYPASS GRAFT      X 5    CORONARY STENT PLACEMENT      X 1    GASTROSTOMY TUBE PLACEMENT  10/09/2017    HEEL SPUR SURGERY Left 2005       Review of patient's allergies indicates:   Allergen Reactions    Pneumococcal 23-fiona ps vaccine        Current Facility-Administered Medications on File Prior to Encounter   Medication    [COMPLETED] fat emulsion 20% infusion 250 mL    [COMPLETED] fluconazole in NaCl 100 MG/50 ML IV PGBK    [DISCONTINUED] acetaminophen tablet 650 mg    [DISCONTINUED] albuterol-ipratropium 2.5 mg-0.5 mg/3 mL nebulizer solution 3 mL    [DISCONTINUED] amino acid 5% in 15% dextrose (CLINIMIX-E) solution with additives (1L provides 50gm AA, 150gm CHO (510 kcal/L dextrose), Na 35, K 30, Mg 5, Ca 4.5, Acetate 80, Cl 39, Phos 15) (710 total kcal/L)    [DISCONTINUED] amino acid 5% in 15% dextrose (CLINIMIX-E) solution with additives (1L provides 50gm AA, 150gm CHO (510 kcal/L dextrose), Na 35, K 30, Mg 5, Ca 4.5, Acetate 80, Cl 39, Phos 15) (710 total kcal/L)    [DISCONTINUED] enoxaparin injection 40 mg    [DISCONTINUED] fat emulsion 20% infusion 250 mL    [DISCONTINUED] fentaNYL 25 mcg/hr 1 patch    [COMPLETED] gastroview 60 mL    [DISCONTINUED] methylPREDNISolone sodium succinate injection 40 mg    [DISCONTINUED] metoclopramide HCl injection 10 mg    [DISCONTINUED] mirtazapine tablet 15 mg    [DISCONTINUED] morphine injection 2 mg    [DISCONTINUED] nebivolol tablet 5 mg    [DISCONTINUED] ondansetron injection 4 mg    [DISCONTINUED] oxyCODONE immediate release tablet 10 mg    [DISCONTINUED] piperacillin-tazobactam 4.5 g in dextrose 5 % 100 mL IVPB (ready to mix system)     [DISCONTINUED] pneumoc 13-fiona conj-dip cr(PF) 0.5 mL    [DISCONTINUED] polyethylene glycol packet 17 g    [DISCONTINUED] prochlorperazine tablet 10 mg    [DISCONTINUED] ramelteon tablet 8 mg    [DISCONTINUED] sodium chloride 3.5% nebulizer solution 4 mL    [DISCONTINUED] tobramycin injection 300 mg     Current Outpatient Medications on File Prior to Encounter   Medication Sig    acetaminophen (TYLENOL) 325 MG tablet Take 2 tablets (650 mg total) by mouth every 8 (eight) hours as needed for Temperature greater than (or equal to 101 degree F).    albuterol 90 mcg/actuation inhaler Inhale 2 puffs into the lungs every 4 (four) hours as needed for Wheezing or Shortness of Breath. Rescue    albuterol-ipratropium (DUO-NEB) 2.5 mg-0.5 mg/3 mL nebulizer solution Take 3 mLs by nebulization every 6 (six) hours while awake. Rescue    enoxaparin (LOVENOX) 40 mg/0.4 mL Syrg Inject 0.4 mLs (40 mg total) into the skin once daily.    fentaNYL (DURAGESIC) 25 mcg/hr Place 1 patch onto the skin every 48 hours.    methylPREDNISolone sodium succinate (SOLU-MEDROL) 40 mg/mL SolR Inject 40 mg into the vein once daily.    metoclopramide HCl (REGLAN) 5 MG tablet Take 1 tablet (5 mg total) by mouth 3 (three) times daily.    metoclopramide HCl (REGLAN) 5 mg/mL injection Inject 2 mLs (10 mg total) into the vein every 6 (six) hours.    nebivolol (BYSTOLIC) 5 MG Tab Take 5 mg by mouth once daily.    ondansetron (ZOFRAN) 8 MG tablet Take 1 tablet (8 mg total) by mouth every 8 (eight) hours as needed for Nausea.    oxyCODONE (ROXICODONE) 5 MG immediate release tablet 1 or 2 tabs per peg Q 3 HOURS PRN pain    piperacillin sodium/tazobactam (PIPERACILLIN-TAZOBACTAM 4.5G/100ML D5W IVPB, READY TO MIX,) Inject 100 mLs (4.5 g total) into the vein every 8 (eight) hours.    prochlorperazine (COMPAZINE) 10 MG tablet Take 1 tablet (10 mg total) by mouth every 6 (six) hours as needed (nausea and vomitting.  Rotate with Zofran if needed.).     sodium chloride 3.5% 3.5 % nebulizer solution Take 4 mLs by nebulization every 12 (twelve) hours. for 10 days    tobramycin (NEBCIN) 40 mg/mL injection Take 7.5 mLs (300 mg total) by nebulization every 12 (twelve) hours.     Family History     Problem Relation (Age of Onset)    Aneurysm Brother    Cancer Father, Sister    Heart disease Mother    No Known Problems Daughter    Rheum arthritis Mother        Tobacco Use    Smoking status: Former Smoker     Packs/day: 0.25     Years: 50.00     Pack years: 12.50     Types: Cigarettes     Start date: 12/4/1964    Smokeless tobacco: Never Used    Tobacco comment: smoking cessation handout given and reviewed   Substance and Sexual Activity    Alcohol use: No     Comment:  none in 8 months    Drug use: No    Sexual activity: Yes     Partners: Female     Review of Systems   Constitutional: Positive for fatigue and unexpected weight change. Negative for chills and fever.   HENT: Positive for trouble swallowing. Negative for congestion.    Eyes: Negative for discharge and visual disturbance.   Respiratory: Negative for cough, chest tightness and shortness of breath.         Oxygen dependent    Cardiovascular: Positive for chest pain. Negative for palpitations and leg swelling.   Gastrointestinal: Positive for constipation, nausea and vomiting. Negative for abdominal distention and abdominal pain.   Genitourinary: Negative for dysuria and frequency.   Musculoskeletal: Positive for myalgias. Negative for joint swelling.   Skin: Negative for color change and rash.   Neurological: Negative for seizures and syncope.     Objective:     Vital Signs (Most Recent):  Temp: 97.8 °F (36.6 °C) (08/24/18 0300)  Pulse: 71 (08/24/18 0300)  Resp: 20 (08/24/18 0300)  BP: (!) 150/70 (08/24/18 0300)  SpO2: 97 % (08/24/18 0300) Vital Signs (24h Range):  Temp:  [97.8 °F (36.6 °C)-98.4 °F (36.9 °C)] 97.8 °F (36.6 °C)  Pulse:  [68-82] 71  Resp:  [17-25] 20  SpO2:  [94 %-100 %] 97 %  BP:  (127-150)/(57-70) 150/70        There is no height or weight on file to calculate BMI.    Physical Exam   Constitutional: He is oriented to person, place, and time. He appears well-developed. He appears cachectic. He is cooperative. He has a sickly appearance. Nasal cannula in place.   HENT:   Head: Normocephalic and atraumatic.   RIJ TLC   Eyes: EOM are normal. Pupils are equal, round, and reactive to light.   Neck: Normal range of motion. Neck supple.   Cardiovascular: Normal rate, regular rhythm, normal heart sounds and intact distal pulses.   No murmur heard.  Pulmonary/Chest: Effort normal. No respiratory distress. He has rales in the right middle field and the right lower field. He exhibits tenderness.   Abdominal: Soft. Bowel sounds are normal. He exhibits no distension. There is no tenderness.   PEG to LUQ    Musculoskeletal: Normal range of motion. He exhibits no edema.   Neurological: He is alert and oriented to person, place, and time.   Skin: Skin is warm and dry.   Fentanyl patch to L anterior chest wall    Psychiatric: He has a normal mood and affect. His behavior is normal.   Nursing note and vitals reviewed.        CRANIAL NERVES     CN III, IV, VI   Pupils are equal, round, and reactive to light.  Extraocular motions are normal.        Significant Labs:   CBC:   Recent Labs   Lab  08/22/18 0427 08/23/18   0404   WBC  26.34*  24.65*   HGB  7.9*  7.7*   HCT  25.0*  23.9*   PLT  683*  628*     CMP:   Recent Labs   Lab  08/22/18 0427 08/23/18   0404   NA  132*  132*   K  3.7  4.0   CL  98  97   CO2  29  30   GLU  143*  160*   BUN  23*  23*   CREATININE  0.51  0.54   CALCIUM  8.5  8.5   ANIONGAP  5*  5*   EGFRNONAA  >60  >60       Significant Imaging: I have reviewed all pertinent imaging results/findings within the past 24 hours.

## 2018-08-24 NOTE — ASSESSMENT & PLAN NOTE
S/P CABG x 5  - resume home nebivolol dosing after GI evaluation   - maintain on telemetry   - trend electrolytes and replete as indicated

## 2018-08-24 NOTE — CONSULTS
[facility]  Gastroenterology  Consult Note    Patient Name: Ismael Montiel  MRN: [id number]  Admission Date: 8/24/2018  Hospital Length of Stay: 0 days  Code Status: DNR   Attending Provider: Milagros Dhillon MD   Consulting Provider: Francine Fierro MD  Primary Care Physician: Deejay Heck Jr, MD  Principal Problem:Aspiration pneumonia    Inpatient consult to [facility]  Consult performed by: Francine Fierro MD  Consult ordered by: Tammie Sheppard DO  Reason for consult: esophageal stent        Subjective:     HPI:  Ismael Montiel is a 67 y.o. male with lung cancer (failed chemo and radiation) resulting in esophageal compression and fistula, s/p stenting 7/20/18, chronic hypoxic respiratory failure requiring supplemental oxygen at 2-3L NC, HTN, and CAD. who was transferred from [facility] for GI evaluation on 8/24/2018. On 08/17/18 he presented to [facility] and was found to have patchy bilateral infiltrates on CXR.  He was diagnosed with aspiration pneumonia and started empirically on ABX. Respiratory cultures were positive for pseudomonas and candida. He denies fever, chills, or diarrhea. His O2 requirements have increased and he is currently on 8L high flow NC.  He endorses stable SOB, chest / epigastric discomfort, nausea, feeling of abdominal fullness, and constipation.  He has been on TPN d/t aspiration concerns.  He reports that his PEG tube was replaced 08/23/18 d/t faulty balloon.  He underwent an esophagram and CT abd earlier today which suggested the esophageal stent was patent. CT was concerning for impaired gastric emptying, food in the stomach and constipation. [facility] consulted for further evaluation. He reports mild chest discomfort from stent. Trials of Reglan unsuccessful. He takes PO for pleasure.        Past Medical History:   Diagnosis Date    CAD (coronary artery disease) 2014    Cancer     esophagus     Esophageal stenosis     Hyperlipidemia     Hypertension     S/P CABG x 5 2005       Past Surgical History:   Procedure Laterality Date    CARDIAC SURGERY  2005    5 vessel     CORONARY ARTERY BYPASS GRAFT      X 5    CORONARY STENT PLACEMENT      X 1    GASTROSTOMY TUBE PLACEMENT  10/09/2017    HEEL SPUR SURGERY Left 2005       Review of patient's allergies indicates:   Allergen Reactions    Pneumococcal 23-fiona ps vaccine      Family History     Problem Relation (Age of Onset)    Aneurysm Brother    Cancer Father, Sister    Heart disease Mother    No Known Problems Daughter    Rheum arthritis Mother        Tobacco Use    Smoking status: Former Smoker     Packs/day: 0.25     Years: 50.00     Pack years: 12.50     Types: Cigarettes     Start date: 12/4/1964    Smokeless tobacco: Never Used    Tobacco comment: smoking cessation handout given and reviewed   Substance and Sexual Activity    Alcohol use: No     Comment:  none in 8 months    Drug use: No    Sexual activity: Yes     Partners: Female     Review of Systems   Constitutional: Positive for activity change, appetite change, fatigue and unexpected weight change. Negative for chills and fever.   HENT: Positive for trouble swallowing. Negative for congestion.    Eyes: Negative for discharge and visual disturbance.   Respiratory: Negative for cough, chest tightness and shortness of breath.         Oxygen dependent    Cardiovascular: Positive for chest pain. Negative for palpitations and leg swelling.   Gastrointestinal: Positive for constipation, nausea and vomiting. Negative for abdominal distention and abdominal pain.   Genitourinary: Negative for dysuria and frequency.   Musculoskeletal: Negative for joint swelling and myalgias.   Skin: Negative for color change and rash.   Neurological: Negative for seizures and syncope.     Objective:     Vital Signs (Most Recent):  Temp: 98 °F (36.7 °C) (08/24/18 0809)  Pulse: 70 (08/24/18 0821)  Resp: 17  "(08/24/18 0809)  BP: 131/64 (08/24/18 0809)  SpO2: (!) 93 % (08/24/18 0809) Vital Signs (24h Range):  Temp:  [97.8 °F (36.6 °C)-98.2 °F (36.8 °C)] 98 °F (36.7 °C)  Pulse:  [68-80] 70  Resp:  [16-25] 17  SpO2:  [92 %-100 %] 93 %  BP: (127-150)/(63-70) 131/64     Weight: 48.6 kg (107 lb 4 oz) (08/24/18 0904)  Body mass index is 17.31 kg/m².    No intake or output data in the 24 hours ending 08/24/18 1019    Lines/Drains/Airways     Central Venous Catheter Line                 Percutaneous Central Line Insertion/Assessment - triple lumen  08/20/18 1425 right internal jugular 3 days          Drain                 Gastrostomy/Enterostomy Percutaneous endoscopic gastrostomy (PEG) LUQ feeding -- days          Peripheral Intravenous Line                 Peripheral IV - Single Lumen 08/17/18 1650 Left Antecubital 6 days                Physical Exam   Constitutional: He is oriented to person, place, and time. He appears well-developed. He appears cachectic. He is cooperative. He has a sickly appearance. Nasal cannula in place.   HENT:   Head: Normocephalic and atraumatic.   RIJ TLC   Eyes: EOM are normal. Pupils are equal, round, and reactive to light.   Neck: Normal range of motion. Neck supple.   Cardiovascular: Normal rate, regular rhythm, normal heart sounds and intact distal pulses.   No murmur heard.  Pulmonary/Chest: Effort normal. No respiratory distress. He has rales. He exhibits no tenderness.   On oxygen   Abdominal: Soft. Bowel sounds are normal. He exhibits no distension and no mass. There is no tenderness. There is no rebound and no guarding. No hernia.   PEG to LUQ    Musculoskeletal: Normal range of motion. He exhibits no edema.   Neurological: He is alert and oriented to person, place, and time.   Skin: Skin is warm and dry.   Psychiatric: He has a normal mood and affect. His behavior is normal.   Nursing note and vitals reviewed.    /64   Pulse 70   Temp 98 °F (36.7 °C) (Oral)   Resp 17   Ht 5' 6" " (1.676 m)   Wt 48.6 kg (107 lb 4 oz)   SpO2 (!) 93%   BMI 17.31 kg/m²    Lab Results   Component Value Date    WBC 22.25 (H) 08/24/2018    HGB 7.7 (L) 08/24/2018    HCT 25.1 (L) 08/24/2018    MCV 83 08/24/2018     (H) 08/24/2018     Lab Results   Component Value Date    INR 1.3 08/21/2018     Lab Results   Component Value Date     (L) 08/23/2018    K 4.0 08/23/2018    CREATININE 0.54 08/23/2018     Lab Results   Component Value Date    ALBUMIN 2.5 (L) 08/21/2018    ALBUMIN 2.5 (L) 08/21/2018    ALT 31 08/21/2018    AST 21 08/21/2018    ALKPHOS 84 08/21/2018    BILITOT 0.1 (L) 08/21/2018     No results found for: AFP  Lab Results   Component Value Date    LIPASE <10 (L) 04/10/2018     No results found for: TACROLIMUS    Imaging:  Reviewed and as noted in HPI.         Assessment/Plan:     Dysphagia    Ismael Montiel is a 67 y.o. male with lung cancer, esophageal stricture and fistula, s/p stenting on 7/20.   Admitted with aspiration pneumonia.   Delayed emptying on CT scan.   Please keep NPO, continue TPN.   We will plan for EGD to evaluate the stent and rule out GOO.   If no GOO, plan for GJ conversion.  Recommend aggressive bowel care.   Consider pro motility agents, Reglan/erithromcyin.            Thank you for your consult. I will follow-up with patient. Please contact us if you have any additional questions.    Francine Fierro MD  Gastroenterology  [facility]

## 2018-08-24 NOTE — ASSESSMENT & PLAN NOTE
- hx of lung cancer resulting in esophageal compression  - s/p radiation therapy   - resume fentanyl patch and PRN pain management   - wean supplemental oxygen as tolerated

## 2018-08-24 NOTE — H&P (VIEW-ONLY)
[facility]  Gastroenterology  Consult Note    Patient Name: Ismael Montiel  MRN: [id number]  Admission Date: 8/24/2018  Hospital Length of Stay: 0 days  Code Status: DNR   Attending Provider: Milagros Dhillon MD   Consulting Provider: Francine Fierro MD  Primary Care Physician: Deejay Heck Jr, MD  Principal Problem:Aspiration pneumonia    Inpatient consult to [facility] ([facility])  Consult performed by: Francine Fierro MD  Consult ordered by: Tammie Sheppard DO  Reason for consult: esophageal stent        Subjective:     HPI:  Ismael Montiel is a 67 y.o. male with lung cancer (failed chemo and radiation) resulting in esophageal compression and fistula, s/p stenting 7/20/18, chronic hypoxic respiratory failure requiring supplemental oxygen at 2-3L NC, HTN, and CAD. who was transferred from [facility] for GI evaluation on 8/24/2018. On 08/17/18 he presented to [facility] and was found to have patchy bilateral infiltrates on CXR.  He was diagnosed with aspiration pneumonia and started empirically on ABX. Respiratory cultures were positive for pseudomonas and candida. He denies fever, chills, or diarrhea. His O2 requirements have increased and he is currently on 8L high flow NC.  He endorses stable SOB, chest / epigastric discomfort, nausea, feeling of abdominal fullness, and constipation.  He has been on TPN d/t aspiration concerns.  He reports that his PEG tube was replaced 08/23/18 d/t faulty balloon.  He underwent an esophagram and CT abd earlier today which suggested the esophageal stent was patent. CT was concerning for impaired gastric emptying, food in the stomach and constipation. [facility] consulted for further evaluation. He reports mild chest discomfort from stent. Trials of Reglan unsuccessful. He takes PO for pleasure.        Past Medical History:   Diagnosis Date    CAD (coronary artery disease) 2014    Cancer     esophagus     Esophageal stenosis     Hyperlipidemia     Hypertension     S/P CABG x 5 2005       Past Surgical History:   Procedure Laterality Date    CARDIAC SURGERY  2005    5 vessel     CORONARY ARTERY BYPASS GRAFT      X 5    CORONARY STENT PLACEMENT      X 1    GASTROSTOMY TUBE PLACEMENT  10/09/2017    HEEL SPUR SURGERY Left 2005       Review of patient's allergies indicates:   Allergen Reactions    Pneumococcal 23-fiona ps vaccine      Family History     Problem Relation (Age of Onset)    Aneurysm Brother    Cancer Father, Sister    Heart disease Mother    No Known Problems Daughter    Rheum arthritis Mother        Tobacco Use    Smoking status: Former Smoker     Packs/day: 0.25     Years: 50.00     Pack years: 12.50     Types: Cigarettes     Start date: 12/4/1964    Smokeless tobacco: Never Used    Tobacco comment: smoking cessation handout given and reviewed   Substance and Sexual Activity    Alcohol use: No     Comment:  none in 8 months    Drug use: No    Sexual activity: Yes     Partners: Female     Review of Systems   Constitutional: Positive for activity change, appetite change, fatigue and unexpected weight change. Negative for chills and fever.   HENT: Positive for trouble swallowing. Negative for congestion.    Eyes: Negative for discharge and visual disturbance.   Respiratory: Negative for cough, chest tightness and shortness of breath.         Oxygen dependent    Cardiovascular: Positive for chest pain. Negative for palpitations and leg swelling.   Gastrointestinal: Positive for constipation, nausea and vomiting. Negative for abdominal distention and abdominal pain.   Genitourinary: Negative for dysuria and frequency.   Musculoskeletal: Negative for joint swelling and myalgias.   Skin: Negative for color change and rash.   Neurological: Negative for seizures and syncope.     Objective:     Vital Signs (Most Recent):  Temp: 98 °F (36.7 °C) (08/24/18 0809)  Pulse: 70 (08/24/18 0821)  Resp: 17  "(08/24/18 0809)  BP: 131/64 (08/24/18 0809)  SpO2: (!) 93 % (08/24/18 0809) Vital Signs (24h Range):  Temp:  [97.8 °F (36.6 °C)-98.2 °F (36.8 °C)] 98 °F (36.7 °C)  Pulse:  [68-80] 70  Resp:  [16-25] 17  SpO2:  [92 %-100 %] 93 %  BP: (127-150)/(63-70) 131/64     Weight: 48.6 kg (107 lb 4 oz) (08/24/18 0904)  Body mass index is 17.31 kg/m².    No intake or output data in the 24 hours ending 08/24/18 1019    Lines/Drains/Airways     Central Venous Catheter Line                 Percutaneous Central Line Insertion/Assessment - triple lumen  08/20/18 1425 right internal jugular 3 days          Drain                 Gastrostomy/Enterostomy Percutaneous endoscopic gastrostomy (PEG) LUQ feeding -- days          Peripheral Intravenous Line                 Peripheral IV - Single Lumen 08/17/18 1650 Left Antecubital 6 days                Physical Exam   Constitutional: He is oriented to person, place, and time. He appears well-developed. He appears cachectic. He is cooperative. He has a sickly appearance. Nasal cannula in place.   HENT:   Head: Normocephalic and atraumatic.   RIJ TLC   Eyes: EOM are normal. Pupils are equal, round, and reactive to light.   Neck: Normal range of motion. Neck supple.   Cardiovascular: Normal rate, regular rhythm, normal heart sounds and intact distal pulses.   No murmur heard.  Pulmonary/Chest: Effort normal. No respiratory distress. He has rales. He exhibits no tenderness.   On oxygen   Abdominal: Soft. Bowel sounds are normal. He exhibits no distension and no mass. There is no tenderness. There is no rebound and no guarding. No hernia.   PEG to LUQ    Musculoskeletal: Normal range of motion. He exhibits no edema.   Neurological: He is alert and oriented to person, place, and time.   Skin: Skin is warm and dry.   Psychiatric: He has a normal mood and affect. His behavior is normal.   Nursing note and vitals reviewed.    /64   Pulse 70   Temp 98 °F (36.7 °C) (Oral)   Resp 17   Ht 5' 6" " (1.676 m)   Wt 48.6 kg (107 lb 4 oz)   SpO2 (!) 93%   BMI 17.31 kg/m²    Lab Results   Component Value Date    WBC 22.25 (H) 08/24/2018    HGB 7.7 (L) 08/24/2018    HCT 25.1 (L) 08/24/2018    MCV 83 08/24/2018     (H) 08/24/2018     Lab Results   Component Value Date    INR 1.3 08/21/2018     Lab Results   Component Value Date     (L) 08/23/2018    K 4.0 08/23/2018    CREATININE 0.54 08/23/2018     Lab Results   Component Value Date    ALBUMIN 2.5 (L) 08/21/2018    ALBUMIN 2.5 (L) 08/21/2018    ALT 31 08/21/2018    AST 21 08/21/2018    ALKPHOS 84 08/21/2018    BILITOT 0.1 (L) 08/21/2018     No results found for: AFP  Lab Results   Component Value Date    LIPASE <10 (L) 04/10/2018     No results found for: TACROLIMUS    Imaging:  Reviewed and as noted in HPI.         Assessment/Plan:     Dysphagia    Ismael Montiel is a 67 y.o. male with lung cancer, esophageal stricture and fistula, s/p stenting on 7/20.   Admitted with aspiration pneumonia.   Delayed emptying on CT scan.   Please keep NPO, continue TPN.   We will plan for EGD to evaluate the stent and rule out GOO.   If no GOO, plan for GJ conversion.  Recommend aggressive bowel care.   Consider pro motility agents, Reglan/erithromcyin.            Thank you for your consult. I will follow-up with patient. Please contact us if you have any additional questions.    Francine Fierro MD  Gastroenterology  [facility]

## 2018-08-24 NOTE — HPI
68 y/o gentleman, who was transferred from [facility] for GI evaluation.  He has a PMH of lung cancer resulting in esophageal compression s/p stenting, chronic hypoxic respiratory failure requiring supplemental oxygen at 2-3L NC, HTN, and CAD.  He states that symptoms started around the time the stent was placed on 07/20/18.  On 08/17/18 he presented to [email or web address] and was found to have patchy bilateral infiltrates on CXR.  He was diagnosed with aspiration pneumonia and started empirically on ABX. Respiratory cultures were positive for pseudomonas and candida. He denies fever, chills, or diarrhea. His O2 requirements have increased and he is currently on 8L high flow NC.  He endorses stable SOB, chest / epigastric discomfort, nausea, feeling of abdominal fullness, and constipation.  He has been on TPN d/t aspiration concerns.  He reports that his PEG tube was replaced 08/23/18 d/t faulty balloon.  He underwent an esophagram and CT abd earlier today which suggested the esophageal stent was patent. CT was concerning for impaired gastric emptying and constipation.

## 2018-08-24 NOTE — ASSESSMENT & PLAN NOTE
Malnutrition in the context of Chronic Illness/Injury    Related to (etiology):  Decreased intake, lung cancer    Signs and Symptoms (as evidenced by):  Energy Intake: <50% of estimated energy requirement for 10 months  Body Fat Depletion: severe depletion of orbitals, triceps and thoracic and lumbar region   Muscle Mass Depletion: severe depletion of clavicle region, scapular region, interosseous muscle and lower extremities , moderate depletion of temples  Weight Loss: 33% x 1 year     Interventions/Recommendations (treatment strategy):  Lower TPN rate to lower GIR. Initiate TF as able.    Nutrition Diagnosis Status:  New

## 2018-08-24 NOTE — PLAN OF CARE
Problem: Patient Care Overview  Goal: Plan of Care Review  Outcome: Ongoing (interventions implemented as appropriate)  Nutrition assessment completed. Please see RD note for details.    Recommendation/Intervention:   1. Recommend decreasing TPN as GIR >5mg/kg/min. Clinimix 5/20 @ 70mL/hr with 20% lipids in 250mL to lower GIR to 4.8mg/kg/min.   - Provides 1978kcals, 84g protein and 1680mL free water which continues to provide >100% estimated energy and protein needs.      2. As medically able to start enteral nutrition, recommend Nutren 2.0 (equivalent to pt's home formula Osmolite). Goal rate 40mL/hr.   - Initiate @ 10mL/hr and increase by 10mL q4hrs, or as tolerated, until goal rate is reached.   - Provides 1920kcals, 81g protein and 664mL free water.   - Pt previously with intolerance to bolus feeds.   - Would recommend continuous feeds if PEG tube remains.      NFPE completed- pt with severe malnutrition of chronic disease with 78lb weight loss in 1 year.      RD to monitor.     Goals: Pt to receive >85% EEN and EPN via TPN and/or TF  Nutrition Goal Status: new  Communication of RD Recs: reviewed with RN

## 2018-08-24 NOTE — ASSESSMENT & PLAN NOTE
- CT findings concerning for constipation   - possibly opiate induced   - bisacodyl supp x1 now   - monitor and adjust therapy as indicated    - reinitiate bowel regimen once cleared by GI

## 2018-08-24 NOTE — ASSESSMENT & PLAN NOTE
- recent diagnoses with aspiration pneumonia while at [facility]  - afebrile on arrival, WBC's trending down slightly from 26 to 24 - appears nontoxic    - cultures positive for pseudomonas and candida   - resume zosyn, diflucan, and tobramycin   - repeat cultures pending  - f/u on results   - duo nebs BID with albuterol nebs PRN SOB  - utilizes 2-3L NC at baseline currently on 8L high flow   - wean supplemental oxygen as tolerated to maintain saturations greater than 92%  - aspiration precautions  - maintain HOB > 30  - NPO

## 2018-08-24 NOTE — PLAN OF CARE
"See early AM note for full daily note.    Updates from today: [facility] consulted. Plan for EGD Monday to reassess stent and assess for gastric outlet obstruction- possible stent exchange vs exchange of G tube for J tube. Patient states reglan did not help his reflux. He states even trickle tube feeds through G tube at OSH made him "cough and gurgle" into his lungs with 30 minutes after receiving them. He is frustrated because hes hungry but hs is on clinamix for nutrition and knows the reasons he will have to be NPO this weekend. At this point nothing per G tube seems to be reasonable as well considering obstructive and reflux symptoms he is describing even on trickle feeds (was on bolus at home prior to admission)    On 10 L oxygen now (was on 20 L at OSH earlier this week). Plan to wean oxygen down as tolerated over the weekend. Continue antibiotics for aspiration pneumonia with pseudomonas on 8/18 in resp culture.   Decrease clinimix rate per nutrition recs.    No other questions or concerns from patient and wife this AM            "

## 2018-08-24 NOTE — PROGRESS NOTES
[facility]  Adult Nutrition  Progress Note    SUMMARY       Recommendations    Recommendation/Intervention:   1. Recommend decreasing TPN as GIR >5mg/kg/min. Clinimix 5/20 @ 70mL/hr with 20% lipids in 250mL to lower GIR to 4.8mg/kg/min.   - Provides 1978kcals, 84g protein and 1680mL free water which continues to provide >100% estimated energy and protein needs.     2. As medically able to start enteral nutrition, recommend Nutren 2.0 (equivalent to pt's home formula Osmolite). Goal rate 40mL/hr.   - Initiate @ 10mL/hr and increase by 10mL q4hrs, or as tolerated, until goal rate is reached.   - Provides 1920kcals, 81g protein and 664mL free water.   - Pt previously with intolerance to bolus feeds.   - Would recommend continuous feeds if PEG tube remains.     NFPE completed- pt with severe malnutrition of chronic disease with 78lb weight loss in 1 year.     RD to monitor.    Goals: Pt to receive >85% EEN and EPN via TPN and/or TF  Nutrition Goal Status: new  Communication of RD Recs: reviewed with RN    Reason for Assessment    Reason for Assessment: new TPN  Diagnosis: other (see comments)(aspiration pneumonia)  Relevant Medical History: lung cancer, HTN, HLD, CAD  Interdisciplinary Rounds: did not attend  General Information Comments: Pt NPO with TPN via central line. Pt and wife report pt with PEG tube since October 2017. Report osmolite TF formula and then increased concentration when weight loss persisted. After intolerance continued (pt feeling full), TF formula was changed to Boost VHC and Ensure Clear. Pt with recent esophageal stent placement. Reports after this stent placement he was able to eat some baked chicken however possible aspiration and has not eaten anything po since. Pt has lost 78lb in 1 year per wife. NFPE completed. Pt with severe malnutrition 2/2 chronic disease.   Nutrition Discharge Planning: TF +/- po intake    Nutrition Risk Screen         Nutrition/Diet History    Do  "you have any cultural, spiritual, Buddhist conflicts, given your current situation?: None at this time  Factors Affecting Nutritional Intake: altered gastrointestinal function, abdominal distention, early satiety, NPO, difficulty/impaired swallowing  Nutrtion Support Frequency Prior to Admit: 2 cans Boost VHC and 1 Ensure Clear via PEG + po intake    Anthropometrics    Temp: 98.6 °F (37 °C)  Height: 5' 6" (167.6 cm)  Height (inches): 66 in  Weight Method: Bed Scale  Weight: 48.6 kg (107 lb 2.3 oz)  Weight (lb): 107.14 lb  Ideal Body Weight (IBW), Male: 142 lb  % Ideal Body Weight, Male (lb): 75.45 lb  BMI (Calculated): 17.3  BMI Grade: 17 - 18.4 protein-energy malnutrition grade I  Weight Loss: unintentional  Usual Body Weight (UBW), k.2 kg  Weight Change Amount: 52 lb  % Usual Body Weight: 67.45  % Weight Change From Usual Weight: -32.69 %       Lab/Procedures/Meds    Pertinent Labs Reviewed: reviewed  Pertinent Labs Comments: HgbA1c 5.9, POCT Glu 137-198, Na 132  Pertinent Medications Reviewed: reviewed  Pertinent Medications Comments: metoclopramide    Physical Findings/Assessment    Overall Physical Appearance: loss of muscle mass, loss of subcutaneous fat, on oxygen therapy, weak, generalized wasting  Tubes: gastrostomy tube  Skin: intact    Estimated/Assessed Needs    Weight Used For Calorie Calculations: 48.6 kg (107 lb 2.3 oz)  Energy Calorie Requirements (kcal): 1819-8864  Energy Need Method: Kcal/kg(30-35kcal/kg)  Protein Requirements: 63-73g(1.3-1.5g/kg)  Weight Used For Protein Calculations: 48.6 kg (107 lb 2.3 oz)  Fluid Requirements (mL): 1mL/kcal or per MD     RDA Method (mL): 1458         Nutrition Prescription Ordered    Current Diet Order: NPO  Current Nutrition Support Formula Ordered: Clinimix E   Current Nutrition Support Rate Ordered: 75 (ml)  Current Nutrition Support Frequency Ordered: mL/hr  Oral Nutrition Supplement: with lipids    Evaluation of Received Nutrient/Fluid " Intake    Parenteral Calories (kcal): 1584  Parenteral Protein (gm): 90  Parenteral Fluid (mL): 1800  Lipid Calories (kcals): 500 kcals  GIR (Glucose Infusion Rate) (mg/kg/min): 5.14 mg/kg/min  Total Calories (kcal): 2084    % Kcal Needs: >100%  % Protein Needs: >100%    Energy Calories Required: exceed needs  Protein Required: exceed needs  Fluid Required: other (see comments)(per MD)    Tolerance: tolerating    % Intake of Estimated Energy Needs: Other: >100%  % Meal Intake: NPO    Nutrition Risk    Level of Risk/Frequency of Follow-up: (f/u 2x/week)     Assessment and Plan    Severe malnutrition    Malnutrition in the context of Chronic Illness/Injury    Related to (etiology):  Decreased intake, lung cancer    Signs and Symptoms (as evidenced by):  Energy Intake: <50% of estimated energy requirement for 10 months  Body Fat Depletion: severe depletion of orbitals, triceps and thoracic and lumbar region   Muscle Mass Depletion: severe depletion of clavicle region, scapular region, interosseous muscle and lower extremities , moderate depletion of temples  Weight Loss: 33% x 1 year     Interventions/Recommendations (treatment strategy):  Lower TPN rate to lower GIR. Initiate TF as able.    Nutrition Diagnosis Status:  New                   Monitor and Evaluation    Food and Nutrient Intake: energy intake, food and beverage intake, enteral nutrition intake, parenteral nutrition intake  Food and Nutrient Adminstration: diet order, enteral and parenteral nutrition administration  Anthropometric Measurements: weight, weight change, body mass index  Biochemical Data, Medical Tests and Procedures: electrolyte and renal panel, gastrointestinal profile, glucose/endocrine profile, inflammatory profile, lipid profile  Nutrition-Focused Physical Findings: overall appearance     Nutrition Follow-Up    RD Follow-up?: Yes

## 2018-08-24 NOTE — ANESTHESIA PREPROCEDURE EVALUATION
[facility]  Anesthesia Pre-Operative Evaluation         Patient Name: Ismael Montiel  YOB: 1950  MRN: [id number]    SUBJECTIVE:     Pre-operative evaluation for Procedure(s) (LRB):  ESOPHAGOGASTRODUODENOSCOPY (EGD) (N/A)     08/24/2018    Ismael Montiel is a 67 y.o. male with PMHx CAD s/p PCI w/ stent and 5 vessel CABG, GERD, HTN, lung cancer (failed chemo and radiation) resulting in esophageal compression s/p esophageal stenting and PEG placement, chronic hypoxic respiratory failure (on home 2-3L NC) transferred from [facility] on 8/24 for AES evaluation. He was admitted to [facility] on 8/17 with aspiration pneumonia (cultures grew pseudomonas and candida), now on IV abx. Recent esophagram and CT abd are concerning for delayed gastric emptying. Patient scheduled for EGD with AES on 8/27 to evaluate esophageal stent and rule GOO.         Patient now presents for the above procedure(s).      LDA: None documented.       Percutaneous Central Line Insertion/Assessment - triple lumen  08/20/18 1425 right internal jugular (Active)   Dressing biopatch in place;dressing dry and intact 8/23/2018  8:36 PM   Securement secured w/ sutures 8/23/2018  8:36 PM   Additional Site Signs no erythema;no warmth;no edema;no pain;no streak formation;no drainage 8/22/2018  8:45 PM   Distal Patency/Care infusing 8/23/2018  8:36 PM   Medial Patency/Care infusing 8/23/2018  8:36 PM   Proximal Patency/Care blood return not present;normal saline locked 8/23/2018  8:36 PM   Dressing Change Due 08/26/18 8/23/2018  8:36 PM   Daily Line Review Performed 8/23/2018  8:36 PM   Number of days: 3            Peripheral IV - Single Lumen 08/17/18 1650 Left Antecubital (Active)   Site Assessment Clean;Dry;Intact;No redness;No swelling 8/23/2018  7:39 AM   Line Status Saline locked 8/22/2018  8:45 PM   Dressing Status Clean;Dry;Intact 8/22/2018  8:45 PM   Dressing Change Due 08/20/18 8/20/2018  3:30 AM   Site Change Due  08/20/18 8/20/2018  3:30 AM   Reason Not Rotated Poor venous access 8/22/2018  8:45 PM   Number of days: 6            Gastrostomy/Enterostomy Percutaneous endoscopic gastrostomy (PEG) LUQ feeding (Active)   Securement taped to abdomen 8/23/2018  8:36 PM   Interventions Prior to Feeding patency checked 8/23/2018  8:36 PM   Drainage brown 8/19/2018  9:30 AM   Feeding Type other (see comments) 8/21/2018  8:37 PM   Clamp Status/Tolerance clamped 8/23/2018  8:36 PM   Feeding Action feeding held 8/23/2018  8:36 PM   Dressing no dressing 8/23/2018  8:36 PM   Insertion Site no redness;no warmth;no tenderness 8/23/2018  8:36 PM   Site Care site cleansed w/ sterile normal saline;sterile 4 x 4 gauze dressing applied 8/23/2018  8:36 PM   Flush/Irrigation flushed w/;water;no resistance met 8/23/2018  8:36 PM   Catheter Position (cm marking) 2 cm 8/18/2018  7:55 PM   Current Rate (mL/hr) 0 mL/hr 8/21/2018  3:40 PM   Intake (mL) 45 mL 8/21/2018 11:06 AM   Water Bolus (mL) 90 mL 8/19/2018  3:40 PM   Tube Feeding Intake (mL) 88 8/21/2018  6:00 AM   Residual Amount (ml) 0 ml 8/21/2018  8:37 PM   Number of days:        Prev airway: Easy mask with oral airway; MAC #3; Grade I view; 7.0 ETT @ 22cm; 1 attempt    Drips:   Amino acid 5% - dextrose 20% (CLINIMIX-E) solution with additives (1L provides 50 gm AA, 200 gm CHO (680 kcal/L dextrose), Na 35, K 30, Mg 5, Ca 4.5, Acetate 80, Cl 39, Phos 15) 75 mL/hr at 08/24/18 0724       Patient Active Problem List   Diagnosis    CAD (coronary artery disease)    S/P CABG x 5    Dysphagia    Bronchogenic cancer of right lung    Diverticulitis of large intestine without perforation or abscess without bleeding    Abdominal pain    Bacteremia due to Clostridium septicum    Leukocytosis    Moderate malnutrition    Infection due to Clostridium septicum    Aspiration pneumonia    Esophageal stricture    Hemoptysis    Anemia, chronic disease    Paroxysmal SVT (supraventricular tachycardia)     Cough    Lung cancer metastatic to bone    Palliative care by specialist    Malfunction of gastrostomy tube    Pseudomonas respiratory infection    Constipation    DNR (do not resuscitate)    Gastroesophageal reflux disease with esophagitis    Chronic pain due to neoplasm    Acute on chronic respiratory failure with hypoxia       Review of patient's allergies indicates:   Allergen Reactions    Pneumococcal 23-fiona ps vaccine        Current Inpatient Medications:   albuterol-ipratropium  3 mL Nebulization BID    fat emulsion 20%  250 mL Intravenous Daily    fentaNYL  1 patch Transdermal Q48H    fluconazole (DIFLUCAN) IVPB  100 mg Intravenous Q24H    methylPREDNISolone sodium succinate  40 mg Intravenous Daily    metoclopramide HCl  5 mg Intravenous Q8H    pantoprazole  40 mg Intravenous Daily    piperacillin-tazobactam (ZOSYN) IVPB  4.5 g Intravenous Q8H       No current facility-administered medications on file prior to encounter.      Current Outpatient Medications on File Prior to Encounter   Medication Sig Dispense Refill    acetaminophen (TYLENOL) 325 MG tablet Take 2 tablets (650 mg total) by mouth every 8 (eight) hours as needed for Temperature greater than (or equal to 101 degree F).  0    albuterol 90 mcg/actuation inhaler Inhale 2 puffs into the lungs every 4 (four) hours as needed for Wheezing or Shortness of Breath. Rescue 1 Inhaler 0    albuterol-ipratropium (DUO-NEB) 2.5 mg-0.5 mg/3 mL nebulizer solution Take 3 mLs by nebulization every 6 (six) hours while awake. Rescue 1 Box 0    enoxaparin (LOVENOX) 40 mg/0.4 mL Syrg Inject 0.4 mLs (40 mg total) into the skin once daily.      fentaNYL (DURAGESIC) 25 mcg/hr Place 1 patch onto the skin every 48 hours. 15 patch 0    methylPREDNISolone sodium succinate (SOLU-MEDROL) 40 mg/mL SolR Inject 40 mg into the vein once daily.      metoclopramide HCl (REGLAN) 5 MG tablet Take 1 tablet (5 mg total) by mouth 3 (three) times daily. 90  tablet 1    metoclopramide HCl (REGLAN) 5 mg/mL injection Inject 2 mLs (10 mg total) into the vein every 6 (six) hours.      nebivolol (BYSTOLIC) 5 MG Tab Take 5 mg by mouth once daily.      ondansetron (ZOFRAN) 8 MG tablet Take 1 tablet (8 mg total) by mouth every 8 (eight) hours as needed for Nausea. 30 tablet 2    oxyCODONE (ROXICODONE) 5 MG immediate release tablet 1 or 2 tabs per peg Q 3 HOURS PRN pain 200 tablet 0    piperacillin sodium/tazobactam (PIPERACILLIN-TAZOBACTAM 4.5G/100ML D5W IVPB, READY TO MIX,) Inject 100 mLs (4.5 g total) into the vein every 8 (eight) hours.      prochlorperazine (COMPAZINE) 10 MG tablet Take 1 tablet (10 mg total) by mouth every 6 (six) hours as needed (nausea and vomitting.  Rotate with Zofran if needed.). 30 tablet 1    sodium chloride 3.5% 3.5 % nebulizer solution Take 4 mLs by nebulization every 12 (twelve) hours. for 10 days 80 mL 0    tobramycin (NEBCIN) 40 mg/mL injection Take 7.5 mLs (300 mg total) by nebulization every 12 (twelve) hours.         Past Surgical History:   Procedure Laterality Date    CARDIAC SURGERY  2005    5 vessel     CORONARY ARTERY BYPASS GRAFT      X 5    CORONARY STENT PLACEMENT      X 1    GASTROSTOMY TUBE PLACEMENT  10/09/2017    HEEL SPUR SURGERY Left 2005       Social History     Socioeconomic History    Marital status:      Spouse name: Not on file    Number of children: Not on file    Years of education: Not on file    Highest education level: Not on file   Social Needs    Financial resource strain: Not on file    Food insecurity - worry: Not on file    Food insecurity - inability: Not on file    Transportation needs - medical: Not on file    Transportation needs - non-medical: Not on file   Occupational History     Employer: [employer]   Tobacco Use    Smoking status: Former Smoker     Packs/day: 0.25     Years: 50.00     Pack years: 12.50     Types: Cigarettes     Start date: 12/4/1964    Smokeless  tobacco: Never Used    Tobacco comment: smoking cessation handout given and reviewed   Substance and Sexual Activity    Alcohol use: No     Comment:  none in 8 months    Drug use: No    Sexual activity: Yes     Partners: Female   Other Topics Concern    Not on file   Social History Narrative    Works in [employer]       OBJECTIVE:     Vital Signs Range (Last 24H):  Temp:  [36.6 °C (97.8 °F)-37 °C (98.6 °F)]   Pulse:  [68-80]   Resp:  [16-20]   BP: (127-150)/(62-70)   SpO2:  [92 %-100 %]       CBC:   Recent Labs      08/23/18   0404  08/24/18   0500   WBC  24.65*  22.25*   RBC  3.00*  3.03*   HGB  7.7*  7.7*   HCT  23.9*  25.1*   PLT  628*  635*   MCV  80*  83   MCH  25.7*  25.4*   MCHC  32.2  30.7*       CMP:   Recent Labs      08/22/18   0427  08/23/18   0404   NA  132*  132*   K  3.7  4.0   CL  98  97   CO2  29  30   BUN  23*  23*   CREATININE  0.51  0.54   GLU  143*  160*   CALCIUM  8.5  8.5       INR:  No results for input(s): PT, INR, PROTIME, APTT in the last 72 hours.    Diagnostic Studies: No relevant studies.    EKG 8/17:   Sinus tachycardia  Possible Left atrial enlargement  Rightward axis  Pulmonary disease pattern  Abnormal ECG    2D ECHO:  No results found for this or any previous visit.      ASSESSMENT/PLAN:         Anesthesia Evaluation    I have reviewed the Patient Summary Reports.    I have reviewed the Nursing Notes.   I have reviewed the Medications.     Review of Systems  Anesthesia Hx:  No problems with previous Anesthesia  History of prior surgery of interest to airway management or planning: Denies Family Hx of Anesthesia complications.   Denies Personal Hx of Anesthesia complications.   Social:  Former Smoker, No Alcohol Use    Hematology/Oncology:         -- Anemia: Hematology Comments: Hgb 7.7 Current/Recent Cancer. chemotherapy and radiation  Oncology Comments: Squamous cell carcinoma of the lung.     EENT/Dental:EENT/Dental Normal   Cardiovascular:   Exercise tolerance:  poor Hypertension, well controlled CAD  CABG/stent  SILVEIRA    Pulmonary:   Pneumonia Denies COPD.  Denies Asthma. Shortness of breath  Denies Sleep Apnea.    Renal/:  Renal/ Normal     Hepatic/GI:   GERD, poorly controlled    Musculoskeletal:  Musculoskeletal Normal    Neurological:   Chronic Pain Syndrome   Endocrine:  Endocrine Normal    Psych:  Psychiatric Normal           Physical Exam  General:  Malnutrition    Airway/Jaw/Neck:  Airway Findings: Mouth Opening: Normal Tongue: Normal  General Airway Assessment: Adult  Mallampati: I  TM Distance: Normal, at least 6 cm  Jaw/Neck Findings:  Neck ROM: Normal ROM  Neck Findings: Normal    Eyes/Ears/Nose:  EYES/EARS/NOSE FINDINGS: Normal   Dental:  Dental Findings: Periodontal disease, Severe    Chest/Lungs:  Chest/Lungs Findings: Normal Respiratory Rate, Rales, Basilar, Expiratory Wheezes, Mild  On 10 L nasal cannula   Heart/Vascular:  Heart Findings: Rate: Normal  Rhythm: Regular Rhythm  Sounds: Normal  Heart murmur: negative       Mental Status:  Mental Status Findings:  Cooperative, Alert and Oriented         Anesthesia Plan  Type of Anesthesia, risks & benefits discussed:  Anesthesia Type:  general, MAC  Patient's Preference:   Intra-op Monitoring Plan: standard ASA monitors  Intra-op Monitoring Plan Comments:   Post Op Pain Control Plan: per primary service following discharge from PACU  Post Op Pain Control Plan Comments:   Induction:   IV  Beta Blocker:  Patient is not currently on a Beta-Blocker (No further documentation required).       Informed Consent: Patient understands risks and agrees with Anesthesia plan.  Questions answered. Anesthesia consent signed with patient.  ASA Score: 3     Day of Surgery Review of History & Physical:    H&P update referred to the surgeon.         Ready For Surgery From Anesthesia Perspective.

## 2018-08-24 NOTE — ASSESSMENT & PLAN NOTE
- underwent recent esophageal stent placement 07/20/18  - transferred from [facility] for GI evaluation / possible AES intervention  - reports ongoing issues with reflux / regurgitation   - CT concerning for impaired gastric emptying and constipation  - continue metoclopramide  - PRN antiemetics    - GI and AES consults pending   - NPO for now   - continue TPN

## 2018-08-24 NOTE — PLAN OF CARE
Patient admitted with a diagnosis of aspiration pneumonia. Patient transferred from [facility]. Per [facility] notes patient was seen by Palliative Care on 8/20/18 and made a full DNR. Patient's PEG was replaced on 8/23/18 prior to transfer to [facility]. Patient transferred to be seen by specialty service (GI consult). CM met with patient to discuss discharge planning. Patient currently lives with his spouse. Patient has family support at home. Patient stated he has no HH preference. CM completed discharge assessment and planning with patient. Patient verbalized understanding. All questions and concerns addressed. SW and CM will continue to follow for any additional needs. Plan A to discharge home with family support as soon as medically stable. Plan B to discharge home with family support and home health.    Patient stated he has used HH services in the past but he can not recall the HH company name. Patient stated he currently has no HH preference.    PCP: Deejay Heck Jr, MD    Pharmacy:   [facility] [address] [place] [address]  [address] [place] [id number]  Phone: [phone] Fax: [phone]    Payor: HU[employer] MANAGED MEDICARE / Plan: [employer] MEDICARE HMO / Product Type: Capitation /      08/24/18 0845   Discharge Assessment   Assessment Type Discharge Planning Assessment   Confirmed/corrected address and phone number on facesheet? Yes   Assessment information obtained from? Patient;Medical Record   Expected Length of Stay (days) 4   Communicated expected length of stay with patient/caregiver yes   Prior to hospitilization cognitive status: Alert/Oriented   Prior to hospitalization functional status: Independent   Current cognitive status: Alert/Oriented   Current Functional Status: Independent   Lives With spouse   Able to Return to Prior Arrangements yes   Is patient able to care for self after discharge? Yes   Who are your  caregiver(s) and their phone number(s)? spouse- Mirna Montiel [phone], [phone]   Patient's perception of discharge disposition home or selfcare   Readmission Within The Last 30 Days no previous admission in last 30 days   Patient currently being followed by outpatient case management? No   Patient currently receives any other outside agency services? No   Equipment Currently Used at Home oxygen  (3L per nasal cannula)   Do you have any problems affording any of your prescribed medications? No   Is the patient taking medications as prescribed? yes   Does the patient have transportation home? Yes   Transportation Available family or friend will provide   Does the patient receive services at the [facility]? No   Discharge Plan A Home with family   Discharge Plan B Home Health;Home with family   Patient/Family In Agreement With Plan yes

## 2018-08-24 NOTE — ASSESSMENT & PLAN NOTE
Ismael Montiel is a 67 y.o. male with lung cancer, esophageal stricture and fistula, s/p stenting on 7/20.   Admitted with aspiration pneumonia.   Delayed emptying on CT scan.   Please keep NPO, continue TPN.   We will plan for EGD to evaluate the stent and rule out GOO.   If no GOO, plan for GJ conversion.  Recommend aggressive bowel care.   Consider pro motility agents, Reglan/erithromcyin.

## 2018-08-24 NOTE — H&P
[facility] Medicine  History & Physical    Patient Name: Ismael Montiel  MRN: [id number]  Admission Date: 8/24/2018  Attending Physician: Tammie Sheppard DO   Primary Care Provider: Deejay Heck Jr, MD    [facility] Medicine Team: [facility] MED T Go Valentine NP     Patient information was obtained from patient and ER records.     Subjective:     Principal Problem:Aspiration pneumonia    Chief Complaint: Inability to tolerate PO intake / TF's         HPI: 66 y/o gentleman, who was transferred from [facility] for GI evaluation.  He has a PMH of lung cancer resulting in esophageal compression s/p stenting, chronic hypoxic respiratory failure requiring supplemental oxygen at 2-3L NC, HTN, and CAD.  He states that symptoms started around the time the stent was placed on 07/20/18.  On 08/17/18 he presented to [facility] and was found to have patchy bilateral infiltrates on CXR.  He was diagnosed with aspiration pneumonia and started empirically on ABX. Respiratory cultures were positive for pseudomonas and candida. He denies fever, chills, or diarrhea. His O2 requirements have increased and he is currently on 8L high flow NC.  He endorses stable SOB, chest / epigastric discomfort, nausea, feeling of abdominal fullness, and constipation.  He has been on TPN d/t aspiration concerns.  He reports that his PEG tube was replaced 08/23/18 d/t faulty balloon.  He underwent an esophagram and CT abd earlier today which suggested the esophageal stent was patent. CT was concerning for impaired gastric emptying and constipation.    Past Medical History:   Diagnosis Date    CAD (coronary artery disease) 2014    Cancer     esophagus    Esophageal stenosis     Hyperlipidemia     Hypertension     S/P CABG x 5 2005       Past Surgical History:   Procedure Laterality Date    CARDIAC SURGERY  2005    5 vessel     CORONARY ARTERY BYPASS GRAFT      X 5    CORONARY STENT PLACEMENT      X 1     GASTROSTOMY TUBE PLACEMENT  10/09/2017    HEEL SPUR SURGERY Left 2005       Review of patient's allergies indicates:   Allergen Reactions    Pneumococcal 23-fiona ps vaccine        Current Facility-Administered Medications on File Prior to Encounter   Medication    [COMPLETED] fat emulsion 20% infusion 250 mL    [COMPLETED] fluconazole in NaCl 100 MG/50 ML IV PGBK    [DISCONTINUED] acetaminophen tablet 650 mg    [DISCONTINUED] albuterol-ipratropium 2.5 mg-0.5 mg/3 mL nebulizer solution 3 mL    [DISCONTINUED] amino acid 5% in 15% dextrose (CLINIMIX-E) solution with additives (1L provides 50gm AA, 150gm CHO (510 kcal/L dextrose), Na 35, K 30, Mg 5, Ca 4.5, Acetate 80, Cl 39, Phos 15) (710 total kcal/L)    [DISCONTINUED] amino acid 5% in 15% dextrose (CLINIMIX-E) solution with additives (1L provides 50gm AA, 150gm CHO (510 kcal/L dextrose), Na 35, K 30, Mg 5, Ca 4.5, Acetate 80, Cl 39, Phos 15) (710 total kcal/L)    [DISCONTINUED] enoxaparin injection 40 mg    [DISCONTINUED] fat emulsion 20% infusion 250 mL    [DISCONTINUED] fentaNYL 25 mcg/hr 1 patch    [COMPLETED] gastroview 60 mL    [DISCONTINUED] methylPREDNISolone sodium succinate injection 40 mg    [DISCONTINUED] metoclopramide HCl injection 10 mg    [DISCONTINUED] mirtazapine tablet 15 mg    [DISCONTINUED] morphine injection 2 mg    [DISCONTINUED] nebivolol tablet 5 mg    [DISCONTINUED] ondansetron injection 4 mg    [DISCONTINUED] oxyCODONE immediate release tablet 10 mg    [DISCONTINUED] piperacillin-tazobactam 4.5 g in dextrose 5 % 100 mL IVPB (ready to mix system)    [DISCONTINUED] pneumoc 13-fiona conj-dip cr(PF) 0.5 mL    [DISCONTINUED] polyethylene glycol packet 17 g    [DISCONTINUED] prochlorperazine tablet 10 mg    [DISCONTINUED] ramelteon tablet 8 mg    [DISCONTINUED] sodium chloride 3.5% nebulizer solution 4 mL    [DISCONTINUED] tobramycin injection 300 mg     Current Outpatient Medications on File Prior to Encounter    Medication Sig    acetaminophen (TYLENOL) 325 MG tablet Take 2 tablets (650 mg total) by mouth every 8 (eight) hours as needed for Temperature greater than (or equal to 101 degree F).    albuterol 90 mcg/actuation inhaler Inhale 2 puffs into the lungs every 4 (four) hours as needed for Wheezing or Shortness of Breath. Rescue    albuterol-ipratropium (DUO-NEB) 2.5 mg-0.5 mg/3 mL nebulizer solution Take 3 mLs by nebulization every 6 (six) hours while awake. Rescue    enoxaparin (LOVENOX) 40 mg/0.4 mL Syrg Inject 0.4 mLs (40 mg total) into the skin once daily.    fentaNYL (DURAGESIC) 25 mcg/hr Place 1 patch onto the skin every 48 hours.    methylPREDNISolone sodium succinate (SOLU-MEDROL) 40 mg/mL SolR Inject 40 mg into the vein once daily.    metoclopramide HCl (REGLAN) 5 MG tablet Take 1 tablet (5 mg total) by mouth 3 (three) times daily.    metoclopramide HCl (REGLAN) 5 mg/mL injection Inject 2 mLs (10 mg total) into the vein every 6 (six) hours.    nebivolol (BYSTOLIC) 5 MG Tab Take 5 mg by mouth once daily.    ondansetron (ZOFRAN) 8 MG tablet Take 1 tablet (8 mg total) by mouth every 8 (eight) hours as needed for Nausea.    oxyCODONE (ROXICODONE) 5 MG immediate release tablet 1 or 2 tabs per peg Q 3 HOURS PRN pain    piperacillin sodium/tazobactam (PIPERACILLIN-TAZOBACTAM 4.5G/100ML D5W IVPB, READY TO MIX,) Inject 100 mLs (4.5 g total) into the vein every 8 (eight) hours.    prochlorperazine (COMPAZINE) 10 MG tablet Take 1 tablet (10 mg total) by mouth every 6 (six) hours as needed (nausea and vomitting.  Rotate with Zofran if needed.).    sodium chloride 3.5% 3.5 % nebulizer solution Take 4 mLs by nebulization every 12 (twelve) hours. for 10 days    tobramycin (NEBCIN) 40 mg/mL injection Take 7.5 mLs (300 mg total) by nebulization every 12 (twelve) hours.     Family History     Problem Relation (Age of Onset)    Aneurysm Brother    Cancer Father, Sister    Heart disease Mother    No Known  Problems Daughter    Rheum arthritis Mother        Tobacco Use    Smoking status: Former Smoker     Packs/day: 0.25     Years: 50.00     Pack years: 12.50     Types: Cigarettes     Start date: 12/4/1964    Smokeless tobacco: Never Used    Tobacco comment: smoking cessation handout given and reviewed   Substance and Sexual Activity    Alcohol use: No     Comment:  none in 8 months    Drug use: No    Sexual activity: Yes     Partners: Female     Review of Systems   Constitutional: Positive for fatigue and unexpected weight change. Negative for chills and fever.   HENT: Positive for trouble swallowing. Negative for congestion.    Eyes: Negative for discharge and visual disturbance.   Respiratory: Negative for cough, chest tightness and shortness of breath.         Oxygen dependent    Cardiovascular: Positive for chest pain. Negative for palpitations and leg swelling.   Gastrointestinal: Positive for constipation, nausea and vomiting. Negative for abdominal distention and abdominal pain.   Genitourinary: Negative for dysuria and frequency.   Musculoskeletal: Positive for myalgias. Negative for joint swelling.   Skin: Negative for color change and rash.   Neurological: Negative for seizures and syncope.     Objective:     Vital Signs (Most Recent):  Temp: 97.8 °F (36.6 °C) (08/24/18 0300)  Pulse: 71 (08/24/18 0300)  Resp: 20 (08/24/18 0300)  BP: (!) 150/70 (08/24/18 0300)  SpO2: 97 % (08/24/18 0300) Vital Signs (24h Range):  Temp:  [97.8 °F (36.6 °C)-98.4 °F (36.9 °C)] 97.8 °F (36.6 °C)  Pulse:  [68-82] 71  Resp:  [17-25] 20  SpO2:  [94 %-100 %] 97 %  BP: (127-150)/(57-70) 150/70        There is no height or weight on file to calculate BMI.    Physical Exam   Constitutional: He is oriented to person, place, and time. He appears well-developed. He appears cachectic. He is cooperative. He has a sickly appearance. Nasal cannula in place.   HENT:   Head: Normocephalic and atraumatic.   RIJ TLC   Eyes: EOM are normal.  Pupils are equal, round, and reactive to light.   Neck: Normal range of motion. Neck supple.   Cardiovascular: Normal rate, regular rhythm, normal heart sounds and intact distal pulses.   No murmur heard.  Pulmonary/Chest: Effort normal. No respiratory distress. He has rales in the right middle field and the right lower field. He exhibits tenderness.   Abdominal: Soft. Bowel sounds are normal. He exhibits no distension. There is no tenderness.   PEG to LUQ    Musculoskeletal: Normal range of motion. He exhibits no edema.   Neurological: He is alert and oriented to person, place, and time.   Skin: Skin is warm and dry.   Fentanyl patch to L anterior chest wall    Psychiatric: He has a normal mood and affect. His behavior is normal.   Nursing note and vitals reviewed.        CRANIAL NERVES     CN III, IV, VI   Pupils are equal, round, and reactive to light.  Extraocular motions are normal.        Significant Labs:   CBC:   Recent Labs   Lab  08/22/18 0427  08/23/18   0404   WBC  26.34*  24.65*   HGB  7.9*  7.7*   HCT  25.0*  23.9*   PLT  683*  628*     CMP:   Recent Labs   Lab  08/22/18   0427  08/23/18   0404   NA  132*  132*   K  3.7  4.0   CL  98  97   CO2  29  30   GLU  143*  160*   BUN  23*  23*   CREATININE  0.51  0.54   CALCIUM  8.5  8.5   ANIONGAP  5*  5*   EGFRNONAA  >60  >60       Significant Imaging: I have reviewed all pertinent imaging results/findings within the past 24 hours.    Assessment/Plan:     * Aspiration pneumonia    - recent diagnoses with aspiration pneumonia while at [facility]  - afebrile on arrival, WBC's trending down slightly from 26 to 24 - appears nontoxic    - cultures positive for pseudomonas and candida   - resume zosyn, diflucan, and tobramycin   - repeat cultures pending  - f/u on results   - duo nebs BID with albuterol nebs PRN SOB  - utilizes 2-3L NC at baseline currently on 8L high flow   - wean supplemental oxygen as tolerated to maintain saturations greater than 92%  - aspiration  precautions  - maintain HOB > 30  - NPO         Esophageal stricture    - underwent recent esophageal stent placement 07/20/18  - transferred from [facility] for GI evaluation / possible AES intervention  - reports ongoing issues with reflux / regurgitation   - CT concerning for impaired gastric emptying and constipation  - continue metoclopramide  - PRN antiemetics    - GI and AES consults pending   - NPO for now   - continue TPN        Lung cancer metastatic to bone    - hx of lung cancer resulting in esophageal compression  - s/p radiation therapy   - resume fentanyl patch and PRN pain management   - wean supplemental oxygen as tolerated         CAD (coronary artery disease)    S/P CABG x 5  - resume home nebivolol dosing after GI evaluation   - maintain on telemetry   - trend electrolytes and replete as indicated         Constipation    - CT findings concerning for constipation   - possibly opiate induced   - bisacodyl supp x1 now   - monitor and adjust therapy as indicated    - reinitiate bowel regimen once cleared by GI         Moderate malnutrition    - cachetic appearing  - reports recent 25-30 weight loss  - CMP and prealbumin pending   - continue TPN for now while awaiting GI evaluation   - consider nutrition consultation once cleared for PO / TF's        Anemia, chronic disease    - Hgb 7.7 on arrival   - monitor closely and transfuse if indicated         DNR (do not resuscitate)                VTE Risk Mitigation (From admission, onward)        Ordered     Place sequential compression device  Until discontinued      08/24/18 0452     Place BRUCE hose  Until discontinued      08/24/18 0452             Go Valentine NP  Department of Hospital Medicine   [facility]

## 2018-08-24 NOTE — ASSESSMENT & PLAN NOTE
- cachetic appearing  - reports recent 25-30 weight loss  - CMP and prealbumin pending   - continue TPN for now while awaiting GI evaluation   - consider nutrition consultation once cleared for PO / TF's

## 2018-08-24 NOTE — SUBJECTIVE & OBJECTIVE
Past Medical History:   Diagnosis Date    CAD (coronary artery disease) 2014    Cancer     esophagus    Esophageal stenosis     Hyperlipidemia     Hypertension     S/P CABG x 5 2005       Past Surgical History:   Procedure Laterality Date    CARDIAC SURGERY  2005    5 vessel     CORONARY ARTERY BYPASS GRAFT      X 5    CORONARY STENT PLACEMENT      X 1    GASTROSTOMY TUBE PLACEMENT  10/09/2017    HEEL SPUR SURGERY Left 2005       Review of patient's allergies indicates:   Allergen Reactions    Pneumococcal 23-fiona ps vaccine      Family History     Problem Relation (Age of Onset)    Aneurysm Brother    Cancer Father, Sister    Heart disease Mother    No Known Problems Daughter    Rheum arthritis Mother        Tobacco Use    Smoking status: Former Smoker     Packs/day: 0.25     Years: 50.00     Pack years: 12.50     Types: Cigarettes     Start date: 12/4/1964    Smokeless tobacco: Never Used    Tobacco comment: smoking cessation handout given and reviewed   Substance and Sexual Activity    Alcohol use: No     Comment:  none in 8 months    Drug use: No    Sexual activity: Yes     Partners: Female     Review of Systems   Constitutional: Positive for activity change, appetite change, fatigue and unexpected weight change. Negative for chills and fever.   HENT: Positive for trouble swallowing. Negative for congestion.    Eyes: Negative for discharge and visual disturbance.   Respiratory: Negative for cough, chest tightness and shortness of breath.         Oxygen dependent    Cardiovascular: Positive for chest pain. Negative for palpitations and leg swelling.   Gastrointestinal: Positive for constipation, nausea and vomiting. Negative for abdominal distention and abdominal pain.   Genitourinary: Negative for dysuria and frequency.   Musculoskeletal: Negative for joint swelling and myalgias.   Skin: Negative for color change and rash.   Neurological: Negative for seizures and syncope.     Objective:      Vital Signs (Most Recent):  Temp: 98 °F (36.7 °C) (08/24/18 0809)  Pulse: 70 (08/24/18 0821)  Resp: 17 (08/24/18 0809)  BP: 131/64 (08/24/18 0809)  SpO2: (!) 93 % (08/24/18 0809) Vital Signs (24h Range):  Temp:  [97.8 °F (36.6 °C)-98.2 °F (36.8 °C)] 98 °F (36.7 °C)  Pulse:  [68-80] 70  Resp:  [16-25] 17  SpO2:  [92 %-100 %] 93 %  BP: (127-150)/(63-70) 131/64     Weight: 48.6 kg (107 lb 4 oz) (08/24/18 0904)  Body mass index is 17.31 kg/m².    No intake or output data in the 24 hours ending 08/24/18 1019    Lines/Drains/Airways     Central Venous Catheter Line                 Percutaneous Central Line Insertion/Assessment - triple lumen  08/20/18 1425 right internal jugular 3 days          Drain                 Gastrostomy/Enterostomy Percutaneous endoscopic gastrostomy (PEG) LUQ feeding -- days          Peripheral Intravenous Line                 Peripheral IV - Single Lumen 08/17/18 1650 Left Antecubital 6 days                Physical Exam   Constitutional: He is oriented to person, place, and time. He appears well-developed. He appears cachectic. He is cooperative. He has a sickly appearance. Nasal cannula in place.   HENT:   Head: Normocephalic and atraumatic.   RIJ TLC   Eyes: EOM are normal. Pupils are equal, round, and reactive to light.   Neck: Normal range of motion. Neck supple.   Cardiovascular: Normal rate, regular rhythm, normal heart sounds and intact distal pulses.   No murmur heard.  Pulmonary/Chest: Effort normal. No respiratory distress. He has rales. He exhibits no tenderness.   On oxygen   Abdominal: Soft. Bowel sounds are normal. He exhibits no distension and no mass. There is no tenderness. There is no rebound and no guarding. No hernia.   PEG to LUQ    Musculoskeletal: Normal range of motion. He exhibits no edema.   Neurological: He is alert and oriented to person, place, and time.   Skin: Skin is warm and dry.   Psychiatric: He has a normal mood and affect. His behavior is normal.  "  Nursing note and vitals reviewed.    /64   Pulse 70   Temp 98 °F (36.7 °C) (Oral)   Resp 17   Ht 5' 6" (1.676 m)   Wt 48.6 kg (107 lb 4 oz)   SpO2 (!) 93%   BMI 17.31 kg/m²   Lab Results   Component Value Date    WBC 22.25 (H) 08/24/2018    HGB 7.7 (L) 08/24/2018    HCT 25.1 (L) 08/24/2018    MCV 83 08/24/2018     (H) 08/24/2018     Lab Results   Component Value Date    INR 1.3 08/21/2018     Lab Results   Component Value Date     (L) 08/23/2018    K 4.0 08/23/2018    CREATININE 0.54 08/23/2018     Lab Results   Component Value Date    ALBUMIN 2.5 (L) 08/21/2018    ALBUMIN 2.5 (L) 08/21/2018    ALT 31 08/21/2018    AST 21 08/21/2018    ALKPHOS 84 08/21/2018    BILITOT 0.1 (L) 08/21/2018     No results found for: AFP  Lab Results   Component Value Date    LIPASE <10 (L) 04/10/2018     No results found for: TACROLIMUS    Imaging:  Reviewed and as noted in HPI.       "

## 2018-08-24 NOTE — HPI
Ismael Montiel is a 67 y.o. male with lung cancer (failed chemo and radiation) resulting in esophageal compression and fistula, s/p stenting 7/20/18, chronic hypoxic respiratory failure requiring supplemental oxygen at 2-3L NC, HTN, and CAD. who was transferred from [facility] for GI evaluation on 8/24/2018. On 08/17/18 he presented to [facility] and was found to have patchy bilateral infiltrates on CXR.  He was diagnosed with aspiration pneumonia and started empirically on ABX. Respiratory cultures were positive for pseudomonas and candida. He denies fever, chills, or diarrhea. His O2 requirements have increased and he is currently on 8L high flow NC.  He endorses stable SOB, chest / epigastric discomfort, nausea, feeling of abdominal fullness, and constipation.  He has been on TPN d/t aspiration concerns.  He reports that his PEG tube was replaced 08/23/18 d/t faulty balloon.  He underwent an esophagram and CT abd earlier today which suggested the esophageal stent was patent. CT was concerning for impaired gastric emptying, food in the stomach and constipation. AES consulted for further evaluation. He reports mild chest discomfort from stent. Trials of Reglan unsuccessful. He takes PO for pleasure.

## 2018-08-25 LAB
ALBUMIN SERPL BCP-MCNC: 1.9 G/DL
ALP SERPL-CCNC: 91 U/L
ALT SERPL W/O P-5'-P-CCNC: 30 U/L
ANION GAP SERPL CALC-SCNC: 5 MMOL/L
ANISOCYTOSIS BLD QL SMEAR: SLIGHT
AST SERPL-CCNC: 19 U/L
BASOPHILS # BLD AUTO: ABNORMAL K/UL
BASOPHILS NFR BLD: 0 %
BILIRUB SERPL-MCNC: 0.3 MG/DL
BNP SERPL-MCNC: 57 PG/ML
BUN SERPL-MCNC: 23 MG/DL
CALCIUM SERPL-MCNC: 9.1 MG/DL
CHLORIDE SERPL-SCNC: 102 MMOL/L
CO2 SERPL-SCNC: 27 MMOL/L
CREAT SERPL-MCNC: 0.6 MG/DL
DIFFERENTIAL METHOD: ABNORMAL
EOSINOPHIL # BLD AUTO: ABNORMAL K/UL
EOSINOPHIL NFR BLD: 0 %
ERYTHROCYTE [DISTWIDTH] IN BLOOD BY AUTOMATED COUNT: 16.3 %
EST. GFR  (AFRICAN AMERICAN): >60 ML/MIN/1.73 M^2
EST. GFR  (NON AFRICAN AMERICAN): >60 ML/MIN/1.73 M^2
GLUCOSE SERPL-MCNC: 177 MG/DL
HCT VFR BLD AUTO: 25.6 %
HGB BLD-MCNC: 7.8 G/DL
HYPOCHROMIA BLD QL SMEAR: ABNORMAL
IMM GRANULOCYTES # BLD AUTO: ABNORMAL K/UL
IMM GRANULOCYTES NFR BLD AUTO: ABNORMAL %
INR PPP: 1
LYMPHOCYTES # BLD AUTO: ABNORMAL K/UL
LYMPHOCYTES NFR BLD: 5 %
MAGNESIUM SERPL-MCNC: 1.9 MG/DL
MCH RBC QN AUTO: 25.2 PG
MCHC RBC AUTO-ENTMCNC: 30.5 G/DL
MCV RBC AUTO: 83 FL
MONOCYTES # BLD AUTO: ABNORMAL K/UL
MONOCYTES NFR BLD: 5 %
NEUTROPHILS NFR BLD: 90 %
NRBC BLD-RTO: 0 /100 WBC
OVALOCYTES BLD QL SMEAR: ABNORMAL
PHOSPHATE SERPL-MCNC: 2.3 MG/DL
PLATELET # BLD AUTO: 612 K/UL
PLATELET BLD QL SMEAR: ABNORMAL
PMV BLD AUTO: 9.4 FL
POCT GLUCOSE: 181 MG/DL (ref 70–110)
POCT GLUCOSE: 188 MG/DL (ref 70–110)
POCT GLUCOSE: 197 MG/DL (ref 70–110)
POCT GLUCOSE: 202 MG/DL (ref 70–110)
POCT GLUCOSE: 231 MG/DL (ref 70–110)
POIKILOCYTOSIS BLD QL SMEAR: SLIGHT
POLYCHROMASIA BLD QL SMEAR: ABNORMAL
POTASSIUM SERPL-SCNC: 4.3 MMOL/L
PROT SERPL-MCNC: 5.5 G/DL
PROTHROMBIN TIME: 10.9 SEC
RBC # BLD AUTO: 3.1 M/UL
SODIUM SERPL-SCNC: 134 MMOL/L
TARGETS BLD QL SMEAR: ABNORMAL
WBC # BLD AUTO: 20.25 K/UL

## 2018-08-25 PROCEDURE — 80053 COMPREHEN METABOLIC PANEL: CPT

## 2018-08-25 PROCEDURE — 97530 THERAPEUTIC ACTIVITIES: CPT

## 2018-08-25 PROCEDURE — 27000221 HC OXYGEN, UP TO 24 HOURS

## 2018-08-25 PROCEDURE — G8988 SELF CARE GOAL STATUS: HCPCS | Mod: CJ

## 2018-08-25 PROCEDURE — 85610 PROTHROMBIN TIME: CPT

## 2018-08-25 PROCEDURE — 63600175 PHARM REV CODE 636 W HCPCS: Performed by: HOSPITALIST

## 2018-08-25 PROCEDURE — G8979 MOBILITY GOAL STATUS: HCPCS | Mod: CJ

## 2018-08-25 PROCEDURE — 99232 SBSQ HOSP IP/OBS MODERATE 35: CPT | Mod: ,,, | Performed by: HOSPITALIST

## 2018-08-25 PROCEDURE — 84100 ASSAY OF PHOSPHORUS: CPT

## 2018-08-25 PROCEDURE — 85027 COMPLETE CBC AUTOMATED: CPT

## 2018-08-25 PROCEDURE — 25000003 PHARM REV CODE 250: Performed by: HOSPITALIST

## 2018-08-25 PROCEDURE — 97165 OT EVAL LOW COMPLEX 30 MIN: CPT

## 2018-08-25 PROCEDURE — 11000001 HC ACUTE MED/SURG PRIVATE ROOM

## 2018-08-25 PROCEDURE — G8989 SELF CARE D/C STATUS: HCPCS | Mod: CJ

## 2018-08-25 PROCEDURE — 85007 BL SMEAR W/DIFF WBC COUNT: CPT

## 2018-08-25 PROCEDURE — 97161 PT EVAL LOW COMPLEX 20 MIN: CPT

## 2018-08-25 PROCEDURE — G8987 SELF CARE CURRENT STATUS: HCPCS | Mod: CJ

## 2018-08-25 PROCEDURE — B4185 PARENTERAL SOL 10 GM LIPIDS: HCPCS | Performed by: HOSPITALIST

## 2018-08-25 PROCEDURE — 27100092 HC HIGH FLOW DELIVERY CANNULA

## 2018-08-25 PROCEDURE — 83880 ASSAY OF NATRIURETIC PEPTIDE: CPT

## 2018-08-25 PROCEDURE — 94640 AIRWAY INHALATION TREATMENT: CPT

## 2018-08-25 PROCEDURE — C9113 INJ PANTOPRAZOLE SODIUM, VIA: HCPCS | Performed by: HOSPITALIST

## 2018-08-25 PROCEDURE — 94761 N-INVAS EAR/PLS OXIMETRY MLT: CPT

## 2018-08-25 PROCEDURE — 25000242 PHARM REV CODE 250 ALT 637 W/ HCPCS: Performed by: HOSPITALIST

## 2018-08-25 PROCEDURE — 83735 ASSAY OF MAGNESIUM: CPT

## 2018-08-25 PROCEDURE — G8978 MOBILITY CURRENT STATUS: HCPCS | Mod: CK

## 2018-08-25 RX ADMIN — METOCLOPRAMIDE 5 MG: 5 INJECTION, SOLUTION INTRAMUSCULAR; INTRAVENOUS at 05:08

## 2018-08-25 RX ADMIN — INSULIN ASPART 2 UNITS: 100 INJECTION, SOLUTION INTRAVENOUS; SUBCUTANEOUS at 05:08

## 2018-08-25 RX ADMIN — HYDROMORPHONE HYDROCHLORIDE 0.5 MG: 1 INJECTION, SOLUTION INTRAMUSCULAR; INTRAVENOUS; SUBCUTANEOUS at 08:08

## 2018-08-25 RX ADMIN — PIPERACILLIN AND TAZOBACTAM 4.5 G: 4; .5 INJECTION, POWDER, LYOPHILIZED, FOR SOLUTION INTRAVENOUS; PARENTERAL at 02:08

## 2018-08-25 RX ADMIN — PIPERACILLIN AND TAZOBACTAM 4.5 G: 4; .5 INJECTION, POWDER, LYOPHILIZED, FOR SOLUTION INTRAVENOUS; PARENTERAL at 11:08

## 2018-08-25 RX ADMIN — ASCORBIC ACID, VITAMIN A PALMITATE, CHOLECALCIFEROL, THIAMINE HYDROCHLORIDE, RIBOFLAVIN-5 PHOSPHATE SODIUM, PYRIDOXINE HYDROCHLORIDE, NIACINAMIDE, DEXPANTHENOL, ALPHA-TOCOPHEROL ACETATE, VITAMIN K1, FOLIC ACID, BIOTIN, CYANOCOBALAMIN: 200; 3300; 200; 6; 3.6; 6; 40; 15; 10; 150; 600; 60; 5 INJECTION, SOLUTION INTRAVENOUS at 02:08

## 2018-08-25 RX ADMIN — METOCLOPRAMIDE 5 MG: 5 INJECTION, SOLUTION INTRAMUSCULAR; INTRAVENOUS at 09:08

## 2018-08-25 RX ADMIN — METOCLOPRAMIDE 5 MG: 5 INJECTION, SOLUTION INTRAMUSCULAR; INTRAVENOUS at 01:08

## 2018-08-25 RX ADMIN — HYDROMORPHONE HYDROCHLORIDE 0.5 MG: 1 INJECTION, SOLUTION INTRAMUSCULAR; INTRAVENOUS; SUBCUTANEOUS at 01:08

## 2018-08-25 RX ADMIN — SOYBEAN OIL 250 ML: 20 INJECTION, SOLUTION INTRAVENOUS at 08:08

## 2018-08-25 RX ADMIN — DEXTROSE 40 MG: 50 INJECTION, SOLUTION INTRAVENOUS at 10:08

## 2018-08-25 RX ADMIN — INSULIN ASPART 2 UNITS: 100 INJECTION, SOLUTION INTRAVENOUS; SUBCUTANEOUS at 12:08

## 2018-08-25 RX ADMIN — PIPERACILLIN AND TAZOBACTAM 4.5 G: 4; .5 INJECTION, POWDER, LYOPHILIZED, FOR SOLUTION INTRAVENOUS; PARENTERAL at 08:08

## 2018-08-25 RX ADMIN — IPRATROPIUM BROMIDE AND ALBUTEROL SULFATE 3 ML: .5; 3 SOLUTION RESPIRATORY (INHALATION) at 08:08

## 2018-08-25 RX ADMIN — FLUCONAZOLE 100 MG: 2 INJECTION, SOLUTION INTRAVENOUS at 12:08

## 2018-08-25 RX ADMIN — METHYLPREDNISOLONE SODIUM SUCCINATE 40 MG: 40 INJECTION, POWDER, FOR SOLUTION INTRAMUSCULAR; INTRAVENOUS at 08:08

## 2018-08-25 RX ADMIN — HYDROMORPHONE HYDROCHLORIDE 0.5 MG: 1 INJECTION, SOLUTION INTRAMUSCULAR; INTRAVENOUS; SUBCUTANEOUS at 02:08

## 2018-08-25 RX ADMIN — HYDROMORPHONE HYDROCHLORIDE 0.5 MG: 1 INJECTION, SOLUTION INTRAMUSCULAR; INTRAVENOUS; SUBCUTANEOUS at 09:08

## 2018-08-25 RX ADMIN — HYDROMORPHONE HYDROCHLORIDE 0.5 MG: 1 INJECTION, SOLUTION INTRAMUSCULAR; INTRAVENOUS; SUBCUTANEOUS at 06:08

## 2018-08-25 NOTE — PT/OT/SLP EVAL
Occupational Therapy   Evaluation and Discharge Note    Name: Ismael Montiel  MRN: [id number]  Admitting Diagnosis:  Aspiration pneumonia      Recommendations:     Discharge Recommendations: home health OT  Discharge Equipment Recommendations:  (HHOT to decide TTB vs shower chair; Acute OT recommending grab bars, RW, and w/c)  Barriers to discharge:  None    History:     Occupational Profile:  Living Environment: Pt lives with wife in a house with 1 LILIAN; pt has a tub/shower combo  Previous level of function: A as needed; mostly independent  Roles and Routines:   Equipment Owned:  oxygen  Assistance upon Discharge: available as needed    Past Medical History:   Diagnosis Date    CAD (coronary artery disease) 2014    Cancer     esophagus    Esophageal stenosis     Hyperlipidemia     Hypertension     S/P CABG x 5 2005       Past Surgical History:   Procedure Laterality Date    CARDIAC SURGERY  2005    5 vessel     CORONARY ARTERY BYPASS GRAFT      X 5    CORONARY STENT PLACEMENT      X 1    GASTROSTOMY TUBE PLACEMENT  10/09/2017    HEEL SPUR SURGERY Left 2005       Subjective     Chief Complaint: none  Patient/Family stated goals: get better and go home  Communicated with: RN prior to session.  Pain/Comfort:  · Pain Rating 1: 0/10  · Pain Rating Post-Intervention 1: 0/10    Patients cultural, spiritual, Hinduism conflicts given the current situation: none stated    Objective:     Patient found with: (oxygen)    General Precautions: Standard, aspiration, fall   Orthopedic Precautions:N/A   Braces: N/A     Occupational Performance:    Bed Mobility:    · NT, found and left UIC    Functional Mobility/Transfers:  · Patient completed Sit <> Stand Transfer with stand by assistance  with  no assistive device   · Patient completed Bed <> Chair Transfer using Stand Pivot technique with stand by assistance with no assistive device  · Functional Mobility: SBA 10 feet assessment; CGA in stance to do darell  "for simulated tub transfer    Activities of Daily Living:  · Lower Body Dressing: supervision doff/donned socks UIC  · Toileting: supervision use of urinal UIC    Cognitive/Visual Perceptual:  Cognitive/Psychosocial Skills:     -       Oriented to: Person, Place, Time and Situation   -       Follows Commands/attention:Follows multistep  commands  -       Communication: clear/fluent  -       Memory: No Deficits noted  -       Safety awareness/insight to disability: intact   -       Mood/Affect/Coping skills/emotional control: Appropriate to situation  Visual/Perceptual:      -Intact      Physical Exam:  Balance:    -       Good/Fair  Postural examination/scapula alignment:    -       No postural abnormalities identified  Dominant hand:    -       R  Upper Extremity Range of Motion:     -       Right Upper Extremity: WFL    -       Left Upper Extremity: WFL      Upper Extremity Strength:    -       Right Upper Extremity: WFL  -       Left Upper Extremity: WFL     Strength:    -       Right Upper Extremity: WNL    -       Left Upper Extremity: WNL    Fine Motor Coordination:    -       Intact  Gross motor coordination:   WFL    Patient left up in chair with all lines intact and call button in reach    [facility] 6 Click:  [facility] Total Score: 22    Treatment & Education:  Pt ed re OT role and POC. Pt ed re safety.  Education:    Assessment:     Ismael Montiel is a 67 y.o. male with a medical diagnosis of Aspiration pneumonia. At this time, patient is functioning near their prior level of function and does not require further acute OT services. Pt would benefit from HHOT to assess home safety and DME needs.    Clinical Decision Makin.  OT Low:  "Pt evaluation falls under low complexity for evaluation coding due to performance deficits noted in 1-3 areas as stated above and 0 co-morbities affecting current functional status. Data obtained from problem focused assessments. No modifications or assistance was " "required for completion of evaluation. Only brief occupational profile and history review completed."     Plan:     During this hospitalization, patient does not require further acute OT services.  Please re-consult if situation changes.    · Plan of Care Reviewed with: patient, spouse    This Plan of care has been discussed with the patient who was involved in its development and understands and is in agreement with the identified goals and treatment plan    GOALS:   Multidisciplinary Problems     Occupational Therapy Goals     Not on file          Multidisciplinary Problems (Resolved)        Problem: Occupational Therapy Goal    Goal Priority Disciplines Outcome Interventions   Occupational Therapy Goal   (Resolved)     OT, PT/OT Outcome(s) achieved                    Time Tracking:     OT Date of Treatment: 08/25/18  OT Start Time: 1135  OT Stop Time: 1144  OT Total Time (min): 9 min    Billable Minutes:Evaluation 9 minutes    GENESIS Monge  8/25/2018  Pager: [phone]    "

## 2018-08-25 NOTE — ASSESSMENT & PLAN NOTE
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 NPO and no feeds per G tube now until procedure Monday- on TPN for nutrition now.  -patient says reglan has not helped with gastric emptying at the other hospital  - PRN antiemetics    - continue TPN

## 2018-08-25 NOTE — PLAN OF CARE
Problem: Occupational Therapy Goal  Goal: Occupational Therapy Goal  Outcome: Outcome(s) achieved Date Met: 08/25/18  Pt is near baseline, but would benefit from HHOT to assess home safety and bathroom DME needs. OT to silvestre/vanessa.    GENESIS Monge  8/25/2018  Pager: [phone]

## 2018-08-25 NOTE — SUBJECTIVE & OBJECTIVE
Past Medical History:   Diagnosis Date    CAD (coronary artery disease) 2014    Cancer     esophagus    Esophageal stenosis     Hyperlipidemia     Hypertension     S/P CABG x 5 2005       Past Surgical History:   Procedure Laterality Date    CARDIAC SURGERY  2005    5 vessel     CORONARY ARTERY BYPASS GRAFT      X 5    CORONARY STENT PLACEMENT      X 1    GASTROSTOMY TUBE PLACEMENT  10/09/2017    HEEL SPUR SURGERY Left 2005     Interval History: Patient reports doing well overnight. Denies any reflux episodes but have no used G tube for feeds and stil strict NPO. He is hopeful he can tolerate some orals or tube feeds as another nutrition source besides TPN after GI procdure on Monday. Pain controlled well. Wife at bedside without any questions today.           Review of patient's allergies indicates:   Allergen Reactions    Pneumococcal 23-fiona ps vaccine        No current facility-administered medications on file prior to encounter.      Current Outpatient Medications on File Prior to Encounter   Medication Sig    acetaminophen (TYLENOL) 325 MG tablet Take 2 tablets (650 mg total) by mouth every 8 (eight) hours as needed for Temperature greater than (or equal to 101 degree F).    albuterol 90 mcg/actuation inhaler Inhale 2 puffs into the lungs every 4 (four) hours as needed for Wheezing or Shortness of Breath. Rescue    albuterol-ipratropium (DUO-NEB) 2.5 mg-0.5 mg/3 mL nebulizer solution Take 3 mLs by nebulization every 6 (six) hours while awake. Rescue    enoxaparin (LOVENOX) 40 mg/0.4 mL Syrg Inject 0.4 mLs (40 mg total) into the skin once daily.    fentaNYL (DURAGESIC) 25 mcg/hr Place 1 patch onto the skin every 48 hours.    methylPREDNISolone sodium succinate (SOLU-MEDROL) 40 mg/mL SolR Inject 40 mg into the vein once daily.    metoclopramide HCl (REGLAN) 5 MG tablet Take 1 tablet (5 mg total) by mouth 3 (three) times daily.    metoclopramide HCl (REGLAN) 5 mg/mL injection Inject 2 mLs (10  mg total) into the vein every 6 (six) hours.    nebivolol (BYSTOLIC) 5 MG Tab Take 5 mg by mouth once daily.    ondansetron (ZOFRAN) 8 MG tablet Take 1 tablet (8 mg total) by mouth every 8 (eight) hours as needed for Nausea.    oxyCODONE (ROXICODONE) 5 MG immediate release tablet 1 or 2 tabs per peg Q 3 HOURS PRN pain    piperacillin sodium/tazobactam (PIPERACILLIN-TAZOBACTAM 4.5G/100ML D5W IVPB, READY TO MIX,) Inject 100 mLs (4.5 g total) into the vein every 8 (eight) hours.    prochlorperazine (COMPAZINE) 10 MG tablet Take 1 tablet (10 mg total) by mouth every 6 (six) hours as needed (nausea and vomitting.  Rotate with Zofran if needed.).    sodium chloride 3.5% 3.5 % nebulizer solution Take 4 mLs by nebulization every 12 (twelve) hours. for 10 days    tobramycin (NEBCIN) 40 mg/mL injection Take 7.5 mLs (300 mg total) by nebulization every 12 (twelve) hours.     Family History     Problem Relation (Age of Onset)    Aneurysm Brother    Cancer Father, Sister    Heart disease Mother    No Known Problems Daughter    Rheum arthritis Mother        Tobacco Use    Smoking status: Former Smoker     Packs/day: 0.25     Years: 50.00     Pack years: 12.50     Types: Cigarettes     Start date: 12/4/1964    Smokeless tobacco: Never Used    Tobacco comment: smoking cessation handout given and reviewed   Substance and Sexual Activity    Alcohol use: No     Comment:  none in 8 months    Drug use: No    Sexual activity: Yes     Partners: Female     Review of Systems   Constitutional: Positive for fatigue and unexpected weight change. Negative for chills and fever.   HENT: Positive for trouble swallowing. Negative for congestion.    Eyes: Negative for discharge and visual disturbance.   Respiratory: Negative for cough, chest tightness and shortness of breath.         Oxygen dependent    Cardiovascular: Positive for chest pain. Negative for palpitations and leg swelling.   Gastrointestinal: Positive for constipation,  nausea and vomiting. Negative for abdominal distention and abdominal pain.   Genitourinary: Negative for dysuria and frequency.   Musculoskeletal: Positive for myalgias. Negative for joint swelling.   Skin: Negative for color change and rash.   Neurological: Negative for seizures and syncope.     Objective:     Vital Signs (Most Recent):  Temp: 98.1 °F (36.7 °C) (08/25/18 1608)  Pulse: 70 (08/25/18 1608)  Resp: 17 (08/25/18 1608)  BP: 132/69 (08/25/18 1608)  SpO2: 99 % (08/25/18 1608) Vital Signs (24h Range):  Temp:  [97.8 °F (36.6 °C)-99.2 °F (37.3 °C)] 98.1 °F (36.7 °C)  Pulse:  [62-85] 70  Resp:  [16-18] 17  SpO2:  [93 %-99 %] 99 %  BP: (116-144)/(65-80) 132/69     Weight: 48.6 kg (107 lb 2.3 oz)  Body mass index is 17.29 kg/m².    Physical Exam   Constitutional: He is oriented to person, place, and time. He appears well-developed. He appears cachectic. He is cooperative. He has a sickly appearance. Nasal cannula in place.   HENT:   Head: Normocephalic and atraumatic.   RIJ TLC   Eyes: EOM are normal. Pupils are equal, round, and reactive to light.   Neck: Normal range of motion. Neck supple.   Cardiovascular: Normal rate, regular rhythm, normal heart sounds and intact distal pulses.   No murmur heard.  Pulmonary/Chest: Effort normal. No respiratory distress. He has rales in the right middle field and the right lower field. He exhibits tenderness.   Abdominal: Soft. Bowel sounds are normal. He exhibits no distension. There is no tenderness.   PEG to LUQ    Musculoskeletal: Normal range of motion. He exhibits no edema.   Neurological: He is alert and oriented to person, place, and time.   Skin: Skin is warm and dry.   Fentanyl patch to L anterior chest wall    Psychiatric: He has a normal mood and affect. His behavior is normal.   Nursing note and vitals reviewed.        CRANIAL NERVES     CN III, IV, VI   Pupils are equal, round, and reactive to light.  Extraocular motions are normal.        Significant Labs:    CBC:   Recent Labs   Lab  08/24/18   0500  08/25/18   0452   WBC  22.25*  20.25*   HGB  7.7*  7.8*   HCT  25.1*  25.6*   PLT  635*  612*     CMP:   Recent Labs   Lab  08/25/18   0452   NA  134*   K  4.3   CL  102   CO2  27   GLU  177*   BUN  23   CREATININE  0.6   CALCIUM  9.1   PROT  5.5*   ALBUMIN  1.9*   BILITOT  0.3   ALKPHOS  91   AST  19   ALT  30   ANIONGAP  5*   EGFRNONAA  >60.0       Significant Imaging: I have reviewed all pertinent imaging results/findings within the past 24 hours.

## 2018-08-25 NOTE — ASSESSMENT & PLAN NOTE
"- cachetic appearing  - reports recent 25-30 weight loss  - continue TPN for now while awaiting GI evaluation   - appreciate nutrition assistance with TPN  -tube feeds- was doing bolus feeds per G tube at home- at OSH was doing trickle feeds but patient reports immediate "gurgling into lungs' when they were doing that, so he is currently not receiving any feeds per G tube and strict NPO until GI procedures on Monday. Nutrition recs for tube feeds in place for possibly after surgery pending work up  "

## 2018-08-25 NOTE — PT/OT/SLP EVAL
"Physical Therapy Evaluation    Patient Name:  Ismael Montiel   MRN:  [id number]  Admitting Diagnosis:  Aspiration pneumonia   Recent Surgery: Procedure(s) (LRB):  ESOPHAGOGASTRODUODENOSCOPY (EGD) (N/A)      Recommendations:     Discharge Recommendations:  home health PT   Discharge Equipment Recommendations: walker, rolling, wheelchair   Barriers to discharge: None    Plan:     During this hospitalization, patient to be seen 3 x/week to address the above listed problems via gait training, therapeutic activities, therapeutic exercises, neuromuscular re-education  · Plan of Care Expires:  09/24/18   Plan of Care Reviewed with: patient    This Plan of care has been discussed with the patient who was involved in its development and understands and is in agreement with the identified goals and treatment plan    History:     Patient lives with their spouse in [place], [place]  in a single family home with 1 LILIAN.  Patient reports being independent mobility & with ADLs.  Patient uses DME as follows: oxygen(3L nasal cannula).    DME owned (not currently used): none.  Hand Dominance: right  Wears Glasses: yes    Roles/Repsonsibilities:   Work: yes, .    Drive: yes.    Managing Medicines/Managing Home: yes.    Hobbies: "I like doing things outside, I haven't been able to do that.".  Upon discharge, patient will have assistance from wife.    Past Medical History:   Diagnosis Date    CAD (coronary artery disease) 2014    Cancer     esophagus    Esophageal stenosis     Hyperlipidemia     Hypertension     S/P CABG x 5 2005       Past Surgical History:   Procedure Laterality Date    CARDIAC SURGERY  2005    5 vessel     CORONARY ARTERY BYPASS GRAFT      X 5    CORONARY STENT PLACEMENT      X 1    GASTROSTOMY TUBE PLACEMENT  10/09/2017    HEEL SPUR SURGERY Left 2005       Subjective     Communicated with RN prior to session.  Patient found supine upon PT entry to room, agreeable to evaluation.  Pt's wife present " "throughout session.  "I'm not able to move my foot now."  Chief Complaint: weakness  Patient comments/goals: to return home, to get stronger  Pain/Comfort:  · Pain Rating 1: (pt c/o back pain, did not grade pain)    Objective:     Patient found with: oxygen, central line, telemetry     General Precautions: Standard, Cardiac aspiration, fall   Orthopedic Precautions:N/A   Braces: N/A   Respiratory Status: nasal cannula 8L    Vital Signs (Most Recent):    Temp: 98.7 °F (37.1 °C) (08/25/18 0759)  Pulse: 75 (08/25/18 0817)  Resp: 18 (08/25/18 0817)  BP: 130/80 (08/25/18 0759)  SpO2: 95 % (08/25/18 0817)    Exam:  · Mental Status: Patient is AxOx4 and follows all multi-step verbal commands. Pt is Alert and Cooperative during session.  · Skin Integrity: Visible skin intact  · Edema: none noted  · Sensation: Intact  · Hearing: Intact  · Vision:  Intact, pt reports worsening vision due to steroid tx.  · Range of Motion:  · RUE: WFL  · LUE: WFL  · RLE: WFL, decreased active DF, WFL passive DF  · LLE: WFL  · Strength Exam:  · Upper Extremity Strength: grossly 4/5  · Lower Extremity Strength: grossly 4/5 except for R DF:2/5    Functional Mobility:  Bed Mobility:   · Supine to Sit: stand by assistance; from right side of bed    Sitting Balance at Edge of Bed:   Assistance Level Required: Supervision   Time: 25 minutes   Postural deviations noted: kyphotic posture   Encouraged: scap retraction, upright posture   Comments: pt laterally weight shifting due to back pain. Vc's for scap retraction and deep breathing    Transfers:   · Sit <> Stand Transfer: contact guard assistance with hand-held assist   · Stand <> Sit Transfer: contact guard assistance with hand-held assist   · x2 from EOB      Gait:  · Patient ambulated: 15ft within room   · Patient required: minimal assist  · Patient used:  HHA  · Gait Pattern observed: reciprocal gait  · Gait Deviation(s): decreased milton, decreased step length, decreased stride length, " decreased toe-to-floor clearance and decreased weight-shifting ability  · Impairments due to: decreased RLE active DF  · Comments: pt with increased hip flexion of RLE to clear R foot.     Therapeutic Activities & Exercises:     Education:  Patient provided with daily orientation and goals of this PT session. Patient and family agreed to participate in session. Patient and family aware of patient's deficits and therapy progression. They were educated to transfer with RN/PCT only; min A of 1 person with RW. Encouraged patient to perform daily exercises & mobility to increase endurance and decrease effects of bedrest. Time provided for therapeutic counseling and discussion of health disposition. All questions answered to patient's and family's satisfaction, within scope of PT practice; voiced no other concerns. White board updated in patient's room, RN notified of session.    Patient left seated at EOB with all lines intact, call button in reach, RN notified and wife present    AM-PAC 6 CLICK MOBILITY  Total Score:17     Assessment:     Ismael Montiel is a 67 y.o. male admitted with a medical diagnosis of Aspiration pneumonia.  He presents with the following impairments/functional limitations: decreased endurance, functional mobility and balance.. Pt with decreased active DF of right foot, pt however does have adequate ROM of ankle. Pt requires increased time and rest breaks due to fatigue and c/o SOB. Ismael Montiel's deficits effect their ability to safely and independently participate in self-care tasks and functional mobility which increases their caregiver burden. Due to his physical therapy diagnosis of debility and deconditioning, they continue to benefit from acute PT services to address the following limitations: high fall risk, weakness, instability, and the need for supervised instruction of exercise. Ismael Recinoss deficits effect their roles and responsibilities in which they were  able to complete prior to admit. Education was provided to patient & family regarding importance of continued participation in therapy, patient's progress and discharge disposition. Pt would benefit from an intensive and collaborative PT/OT/SLP therapy program to improve quality of life and focus on recovery of impairments.     Problem List: weakness, impaired functional mobilty, impaired endurance, gait instability, impaired self care skills, impaired balance  Rehab Prognosis: good; patient would benefit from acute skilled PT services to address these deficits and reach maximum level of function.      GOALS:   Multidisciplinary Problems     Physical Therapy Goals        Problem: Physical Therapy Goal    Goal Priority Disciplines Outcome Goal Variances Interventions   Physical Therapy Goal     PT, PT/OT Ongoing (interventions implemented as appropriate)     Description:  Goals to be met by: 9/7/2018    Patient will increase functional independence with mobility by performing:    Supine <> sit with Modified [place].  Sit <> stand transfer with Modified [place] using No Assistive Device and Rolling Walker.  Bed <> chair transfer via Stand Pivot with Modified [place] using No Assistive Device and Rolling Walker.  Gait  x 150 feet with Contact Guard Assistance using Rolling Walker to prepare for community ambulation and endurance activities.  Dynamic standing for 8 minutes with Contact Guard Assistance using No Assistive Device to prepare for functional tasks in standing.  Able to tolerate exercise for 15-20 reps with independence.                       Clinical Decision Making:   On examination of body system using standardized tests and measures patient presents with 1-2 elements from any of the following: body structures and functions, activity limitations, and/or participation restrictions.  Leading to a clinical presentation that is considered stable and/or uncomplicated  Evaluation Complexity:  Low-  72614      Time Tracking:     PT Received On: 08/25/18  PT Start Time: 0946     PT Stop Time: 1035  PT Total Time (min): 49 min     Billable Minutes: Evaluation 15 and Therapeutic Activity 23    Kiya Shaw PT, DPT  08/25/2018  Pager:[phone]

## 2018-08-25 NOTE — PROGRESS NOTES
[facility] Hospital Medicine  Progress Note    Patient Name: Ismael Montiel  MRN: [id number]  Patient Class: IP- Inpatient   Admission Date: 8/24/2018  Length of Stay: 1 days  Attending Physician: Milagros Dhillon MD  Primary Care Provider: Deejay Heck Jr, MD    Hospital Medicine Team: [facility] HOSP MED K Milagros Dhillon MD    Subjective:     Principal Problem:Aspiration pneumonia    HPI:  68 y/o gentleman, who was transferred from [facility] for GI evaluation.  He has a PMH of lung cancer resulting in esophageal compression s/p stenting, chronic hypoxic respiratory failure requiring supplemental oxygen at 2-3L NC, HTN, and CAD.  He states that symptoms started around the time the stent was placed on 07/20/18.  On 08/17/18 he presented to [facility] and was found to have patchy bilateral infiltrates on CXR.  He was diagnosed with aspiration pneumonia and started empirically on ABX. Respiratory cultures were positive for pseudomonas and candida. He denies fever, chills, or diarrhea. His O2 requirements have increased and he is currently on 8L high flow NC.  He endorses stable SOB, chest / epigastric discomfort, nausea, feeling of abdominal fullness, and constipation.  He has been on TPN d/t aspiration concerns.  He reports that his PEG tube was replaced 08/23/18 d/t faulty balloon.  He underwent an esophagram and CT abd earlier today which suggested the esophageal stent was patent. CT was concerning for impaired gastric emptying and constipation.    Hospital Course:  8/24: GI consulted- plan for EGD, possible G to J tube conversion on Monday. Patient comfortable on exam. No distress. Plans discussed  8/25: titrated from 10L NC to 8L NC. No issues overnight. Pain well controlled with dilaudid. PT/OT consulted for recommendations    Past Medical History:   Diagnosis Date    CAD (coronary artery disease) 2014    Cancer     esophagus    Esophageal stenosis     Hyperlipidemia      Hypertension     S/P CABG x 5 2005       Past Surgical History:   Procedure Laterality Date    CARDIAC SURGERY  2005    5 vessel     CORONARY ARTERY BYPASS GRAFT      X 5    CORONARY STENT PLACEMENT      X 1    GASTROSTOMY TUBE PLACEMENT  10/09/2017    HEEL SPUR SURGERY Left 2005     Interval History: Patient reports doing well overnight. Denies any reflux episodes but have no used G tube for feeds and stil strict NPO. He is hopeful he can tolerate some orals or tube feeds as another nutrition source besides TPN after GI procdure on Monday. Pain controlled well. Wife at bedside without any questions today.           Review of patient's allergies indicates:   Allergen Reactions    Pneumococcal 23-fiona ps vaccine        No current facility-administered medications on file prior to encounter.      Current Outpatient Medications on File Prior to Encounter   Medication Sig    acetaminophen (TYLENOL) 325 MG tablet Take 2 tablets (650 mg total) by mouth every 8 (eight) hours as needed for Temperature greater than (or equal to 101 degree F).    albuterol 90 mcg/actuation inhaler Inhale 2 puffs into the lungs every 4 (four) hours as needed for Wheezing or Shortness of Breath. Rescue    albuterol-ipratropium (DUO-NEB) 2.5 mg-0.5 mg/3 mL nebulizer solution Take 3 mLs by nebulization every 6 (six) hours while awake. Rescue    enoxaparin (LOVENOX) 40 mg/0.4 mL Syrg Inject 0.4 mLs (40 mg total) into the skin once daily.    fentaNYL (DURAGESIC) 25 mcg/hr Place 1 patch onto the skin every 48 hours.    methylPREDNISolone sodium succinate (SOLU-MEDROL) 40 mg/mL SolR Inject 40 mg into the vein once daily.    metoclopramide HCl (REGLAN) 5 MG tablet Take 1 tablet (5 mg total) by mouth 3 (three) times daily.    metoclopramide HCl (REGLAN) 5 mg/mL injection Inject 2 mLs (10 mg total) into the vein every 6 (six) hours.    nebivolol (BYSTOLIC) 5 MG Tab Take 5 mg by mouth once daily.    ondansetron (ZOFRAN) 8 MG tablet  Take 1 tablet (8 mg total) by mouth every 8 (eight) hours as needed for Nausea.    oxyCODONE (ROXICODONE) 5 MG immediate release tablet 1 or 2 tabs per peg Q 3 HOURS PRN pain    piperacillin sodium/tazobactam (PIPERACILLIN-TAZOBACTAM 4.5G/100ML D5W IVPB, READY TO MIX,) Inject 100 mLs (4.5 g total) into the vein every 8 (eight) hours.    prochlorperazine (COMPAZINE) 10 MG tablet Take 1 tablet (10 mg total) by mouth every 6 (six) hours as needed (nausea and vomitting.  Rotate with Zofran if needed.).    sodium chloride 3.5% 3.5 % nebulizer solution Take 4 mLs by nebulization every 12 (twelve) hours. for 10 days    tobramycin (NEBCIN) 40 mg/mL injection Take 7.5 mLs (300 mg total) by nebulization every 12 (twelve) hours.     Family History     Problem Relation (Age of Onset)    Aneurysm Brother    Cancer Father, Sister    Heart disease Mother    No Known Problems Daughter    Rheum arthritis Mother        Tobacco Use    Smoking status: Former Smoker     Packs/day: 0.25     Years: 50.00     Pack years: 12.50     Types: Cigarettes     Start date: 12/4/1964    Smokeless tobacco: Never Used    Tobacco comment: smoking cessation handout given and reviewed   Substance and Sexual Activity    Alcohol use: No     Comment:  none in 8 months    Drug use: No    Sexual activity: Yes     Partners: Female     Review of Systems   Constitutional: Positive for fatigue and unexpected weight change. Negative for chills and fever.   HENT: Positive for trouble swallowing. Negative for congestion.    Eyes: Negative for discharge and visual disturbance.   Respiratory: Negative for cough, chest tightness and shortness of breath.         Oxygen dependent    Cardiovascular: Positive for chest pain. Negative for palpitations and leg swelling.   Gastrointestinal: Positive for constipation, nausea and vomiting. Negative for abdominal distention and abdominal pain.   Genitourinary: Negative for dysuria and frequency.   Musculoskeletal:  Positive for myalgias. Negative for joint swelling.   Skin: Negative for color change and rash.   Neurological: Negative for seizures and syncope.     Objective:     Vital Signs (Most Recent):  Temp: 98.1 °F (36.7 °C) (08/25/18 1608)  Pulse: 70 (08/25/18 1608)  Resp: 17 (08/25/18 1608)  BP: 132/69 (08/25/18 1608)  SpO2: 99 % (08/25/18 1608) Vital Signs (24h Range):  Temp:  [97.8 °F (36.6 °C)-99.2 °F (37.3 °C)] 98.1 °F (36.7 °C)  Pulse:  [62-85] 70  Resp:  [16-18] 17  SpO2:  [93 %-99 %] 99 %  BP: (116-144)/(65-80) 132/69     Weight: 48.6 kg (107 lb 2.3 oz)  Body mass index is 17.29 kg/m².    Physical Exam   Constitutional: He is oriented to person, place, and time. He appears well-developed. He appears cachectic. He is cooperative. He has a sickly appearance. Nasal cannula in place.   HENT:   Head: Normocephalic and atraumatic.   RIJ TLC   Eyes: EOM are normal. Pupils are equal, round, and reactive to light.   Neck: Normal range of motion. Neck supple.   Cardiovascular: Normal rate, regular rhythm, normal heart sounds and intact distal pulses.   No murmur heard.  Pulmonary/Chest: Effort normal. No respiratory distress. He has rales in the right middle field and the right lower field. He exhibits tenderness.   Abdominal: Soft. Bowel sounds are normal. He exhibits no distension. There is no tenderness.   PEG to LUQ    Musculoskeletal: Normal range of motion. He exhibits no edema.   Neurological: He is alert and oriented to person, place, and time.   Skin: Skin is warm and dry.   Fentanyl patch to L anterior chest wall    Psychiatric: He has a normal mood and affect. His behavior is normal.   Nursing note and vitals reviewed.        CRANIAL NERVES     CN III, IV, VI   Pupils are equal, round, and reactive to light.  Extraocular motions are normal.        Significant Labs:   CBC:   Recent Labs   Lab  08/24/18   0500  08/25/18   0452   WBC  22.25*  20.25*   HGB  7.7*  7.8*   HCT  25.1*  25.6*   PLT  635*  612*     CMP:    Recent Labs   Lab  08/25/18   0452   NA  134*   K  4.3   CL  102   CO2  27   GLU  177*   BUN  23   CREATININE  0.6   CALCIUM  9.1   PROT  5.5*   ALBUMIN  1.9*   BILITOT  0.3   ALKPHOS  91   AST  19   ALT  30   ANIONGAP  5*   EGFRNONAA  >60.0       Significant Imaging: I have reviewed all pertinent imaging results/findings within the past 24 hours.    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, cannot prevent this from being a recurrent issue  - afebrile on arrival, WBC's trending down, nontoxic on exam  - cultures positive for pseudomonas and candida   - resume zosyn, diflucan, and tobramycin   - repeat cultures pending  - duo nebs BID with albuterol nebs PRN SOB  - utilizes 2-3L NC at [facility]--> titrating down oxygen now- on 8L now  - wean supplemental oxygen as tolerated to maintain saturations greater than 92%  - aspiration precautions  - maintain HOB > 30  - NPO until GI eval monday        DNR (do not resuscitate)              Constipation    - BM reported today.   -monitor closely while on opiates        Lung cancer metastatic to bone    - hx of lung cancer resulting in esophageal compression  - s/p radiation therapy   - resume fentanyl patch and PRN pain management   - wean supplemental oxygen as tolerated         Anemia, chronic disease    - Hgb 7.8/25. 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 NPO and no feeds per G tube now until procedure Monday- on TPN for nutrition now.  -patient says reglan has not helped with gastric emptying at the other hospital  - PRN antiemetics    - continue TPN        Severe malnutrition    - cachetic appearing  - reports recent 25-30 weight loss  - continue TPN for now while awaiting GI evaluation   - appreciate nutrition assistance with TPN  -tube feeds- was doing bolus feeds per G tube at home- at OSH was doing trickle feeds but patient reports immediate "gurgling into lungs' when they were doing that, so he is currently not receiving any feeds per G tube and strict NPO until GI procedures on Monday. 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, history diagnosed in September 2017- completed radiation, was on keytruda, PET showed progression on that and is currently off of chemo until he can "get stronger" per most recent notes          Dysphagia              S/P CABG x 5              CAD (coronary artery disease)    S/P CABG x 5  - home nebivolol currently held  - maintain on telemetry             VTE Risk Mitigation (From admission, onward)        Ordered     Place sequential compression device  Until discontinued      08/24/18 0452     Place BRUCE hose  Until discontinued      08/24/18 0452        Dispo: plan for EGD and possible G to J exchange Monday with GI procedures. Wean off oxygen down to 3L (baseline at home) in the meantime as possible, continue TPN. Continue treatment for aspiration pneumonia. Expect discharge possibly mid next week. PT/OT jaron pending.    Milagros Dhillon MD  Department of Hospital Medicine   [facility]  "

## 2018-08-25 NOTE — HOSPITAL COURSE
8/24: GI consulted- plan for EGD, possible G to J tube conversion on Monday. Patient comfortable on exam. No distress. Plans discussed  8/25: titrated from 10L NC to 8L NC. No issues overnight. Pain well controlled with dilaudid. PT/OT consulted for recommendations  8:26: down to 4-5L NC today. Feeling improved overall. Titrated IV steroids down today. Plan for 14 day course of fluconazole for thrush hx, zosyn for pneumonia total.  8/27: EGD showing large amount of food residue in stomach with gastric stenosis at pylorus from extrinsic severe stenosis/compression causing the partial outlet obstruction with balloon dilitation. Stent in place. GI recs for clear liquid diet, check G tube residuals, start reglan 10 mg TID PO.   8/28: Patient so far tolerating clear liquid diet. AES okay with advancing to soft mechanical diet but recommend to avoid foods that are high in fiber. Plan to advance to mechanical soft diet on 8/29. Will allow TPN to end tonight and not reorder. If patient able to tolerate enough oral diet may not need to supplement with TF via his G tube but will need to see how patient tolerates oral food intake. Oxygenation improved and patient down to 3.5 liters of oxygen.   8/29: Calorie count in progress. Patient's diet advanced to mechanical soft diet. Monitoring oral intake and so far is tolerating oral diet. Patient off TPN. Patient still having significant back pain related to his metastatic lung cancer.   8/30: Patient eating about 50% of meals. Discussed with patient and wife about supplementing with cans of Boost at home to help with nutrition. Patient tolerating soft, mechanical diet without difficulty. Plan home discharge tomorrow,8/31. Patient on 3.5 liters of oxygen. Patient on 3 liters at home.  8/31: Patient tolerating soft diet with no difficulties. Boost added to supplement diet and patient can either drink or use PEG tube. Spoke with GI and AES service states patient just needs follow-up  as needed. If patient has recurrence of symptoms to call GI clinic to arrange for follow-up. Patient to follow-up with his primary care physician in 1-2 weeks and his oncologist on [facility] in 2-3 weeks to discuss continued treatment of his lung cancer. Patient discharged home with [facility]  PT/OT and nursing. Patient discharged on Reglan 10 mg po TID with meals to help with gastric emptying as per GI recs.

## 2018-08-25 NOTE — ASSESSMENT & PLAN NOTE
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

## 2018-08-25 NOTE — PLAN OF CARE
Problem: Physical Therapy Goal  Goal: Physical Therapy Goal  Goals to be met by: 9/7/2018    Patient will increase functional independence with mobility by performing:    Supine <> sit with Modified [place].  Sit <> stand transfer with Modified [place] using No Assistive Device and Rolling Walker.  Bed <> chair transfer via Stand Pivot with Modified [place] using No Assistive Device and Rolling Walker.  Gait  x 150 feet with Contact Guard Assistance using Rolling Walker to prepare for community ambulation and endurance activities.  Dynamic standing for 8 minutes with Contact Guard Assistance using No Assistive Device to prepare for functional tasks in standing.  Able to tolerate exercise for 15-20 reps with independence.    Outcome: Ongoing (interventions implemented as appropriate)    Pt would benefit from HHPT upon discharge.  Appropriate transfer level with nursing staff: Bed <> Chair:  Stand Pivot with Minimal Assistance with 1 person with RW.    Kiya Shaw PT, DPT  8/25/2018  Pager: [phone]

## 2018-08-25 NOTE — PLAN OF CARE
Problem: Patient Care Overview  Goal: Plan of Care Review  Outcome: Ongoing (interventions implemented as appropriate)  No changes overnight. Continue TPN and IV abx. Able to wean HFNC to 8L. PRN dilaudid for midsternal pain. Pt OOB and turning self independently. VSS. Will continue to monitor. All protocols ongoing.

## 2018-08-25 NOTE — ASSESSMENT & PLAN NOTE
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

## 2018-08-26 PROBLEM — B37.0 THRUSH: Status: ACTIVE | Noted: 2018-08-26

## 2018-08-26 LAB
ALBUMIN SERPL BCP-MCNC: 1.9 G/DL
ALP SERPL-CCNC: 96 U/L
ALT SERPL W/O P-5'-P-CCNC: 51 U/L
ANION GAP SERPL CALC-SCNC: 4 MMOL/L
ANISOCYTOSIS BLD QL SMEAR: SLIGHT
AST SERPL-CCNC: 41 U/L
BASOPHILS NFR BLD: 0 %
BILIRUB SERPL-MCNC: 0.3 MG/DL
BUN SERPL-MCNC: 22 MG/DL
CALCIUM SERPL-MCNC: 9.1 MG/DL
CHLORIDE SERPL-SCNC: 101 MMOL/L
CO2 SERPL-SCNC: 27 MMOL/L
CREAT SERPL-MCNC: 0.6 MG/DL
DIFFERENTIAL METHOD: ABNORMAL
EOSINOPHIL NFR BLD: 0 %
ERYTHROCYTE [DISTWIDTH] IN BLOOD BY AUTOMATED COUNT: 16.6 %
EST. GFR  (AFRICAN AMERICAN): >60 ML/MIN/1.73 M^2
EST. GFR  (NON AFRICAN AMERICAN): >60 ML/MIN/1.73 M^2
GIANT PLATELETS BLD QL SMEAR: PRESENT
GLUCOSE SERPL-MCNC: 173 MG/DL
HCT VFR BLD AUTO: 25.7 %
HGB BLD-MCNC: 8.1 G/DL
HYPOCHROMIA BLD QL SMEAR: ABNORMAL
IMM GRANULOCYTES # BLD AUTO: ABNORMAL K/UL
IMM GRANULOCYTES NFR BLD AUTO: ABNORMAL %
LYMPHOCYTES NFR BLD: 1 %
MAGNESIUM SERPL-MCNC: 1.9 MG/DL
MCH RBC QN AUTO: 25.9 PG
MCHC RBC AUTO-ENTMCNC: 31.5 G/DL
MCV RBC AUTO: 82 FL
METAMYELOCYTES NFR BLD MANUAL: 1 %
MONOCYTES NFR BLD: 2 %
MYELOCYTES NFR BLD MANUAL: 1 %
NEUTROPHILS NFR BLD: 95 %
NRBC BLD-RTO: 0 /100 WBC
PHOSPHATE SERPL-MCNC: 2.6 MG/DL
PLATELET # BLD AUTO: 610 K/UL
PLATELET BLD QL SMEAR: ABNORMAL
PMV BLD AUTO: 9.3 FL
POCT GLUCOSE: 179 MG/DL (ref 70–110)
POCT GLUCOSE: 184 MG/DL (ref 70–110)
POCT GLUCOSE: 188 MG/DL (ref 70–110)
POCT GLUCOSE: 203 MG/DL (ref 70–110)
POCT GLUCOSE: 226 MG/DL (ref 70–110)
POIKILOCYTOSIS BLD QL SMEAR: SLIGHT
POLYCHROMASIA BLD QL SMEAR: ABNORMAL
POTASSIUM SERPL-SCNC: 4.1 MMOL/L
PROT SERPL-MCNC: 5.4 G/DL
RBC # BLD AUTO: 3.13 M/UL
SODIUM SERPL-SCNC: 132 MMOL/L
TOXIC GRANULES BLD QL SMEAR: PRESENT
WBC # BLD AUTO: 21.59 K/UL

## 2018-08-26 PROCEDURE — 97116 GAIT TRAINING THERAPY: CPT

## 2018-08-26 PROCEDURE — 27100171 HC OXYGEN HIGH FLOW UP TO 24 HOURS

## 2018-08-26 PROCEDURE — 63600175 PHARM REV CODE 636 W HCPCS: Performed by: HOSPITALIST

## 2018-08-26 PROCEDURE — 25000003 PHARM REV CODE 250: Performed by: HOSPITALIST

## 2018-08-26 PROCEDURE — 83735 ASSAY OF MAGNESIUM: CPT

## 2018-08-26 PROCEDURE — B4185 PARENTERAL SOL 10 GM LIPIDS: HCPCS | Performed by: HOSPITALIST

## 2018-08-26 PROCEDURE — 94761 N-INVAS EAR/PLS OXIMETRY MLT: CPT

## 2018-08-26 PROCEDURE — 97530 THERAPEUTIC ACTIVITIES: CPT

## 2018-08-26 PROCEDURE — 84100 ASSAY OF PHOSPHORUS: CPT

## 2018-08-26 PROCEDURE — 11000001 HC ACUTE MED/SURG PRIVATE ROOM

## 2018-08-26 PROCEDURE — 25000242 PHARM REV CODE 250 ALT 637 W/ HCPCS: Performed by: HOSPITALIST

## 2018-08-26 PROCEDURE — C9113 INJ PANTOPRAZOLE SODIUM, VIA: HCPCS | Performed by: HOSPITALIST

## 2018-08-26 PROCEDURE — 63600175 PHARM REV CODE 636 W HCPCS: Performed by: PHYSICIAN ASSISTANT

## 2018-08-26 PROCEDURE — 99232 SBSQ HOSP IP/OBS MODERATE 35: CPT | Mod: ,,, | Performed by: HOSPITALIST

## 2018-08-26 PROCEDURE — 27000221 HC OXYGEN, UP TO 24 HOURS

## 2018-08-26 PROCEDURE — 94640 AIRWAY INHALATION TREATMENT: CPT

## 2018-08-26 PROCEDURE — 80053 COMPREHEN METABOLIC PANEL: CPT

## 2018-08-26 RX ORDER — HYDROMORPHONE HYDROCHLORIDE 1 MG/ML
0.5 INJECTION, SOLUTION INTRAMUSCULAR; INTRAVENOUS; SUBCUTANEOUS ONCE AS NEEDED
Status: COMPLETED | OUTPATIENT
Start: 2018-08-26 | End: 2018-08-26

## 2018-08-26 RX ORDER — NALOXONE HCL 0.4 MG/ML
0.4 VIAL (ML) INJECTION
Status: DISCONTINUED | OUTPATIENT
Start: 2018-08-26 | End: 2018-08-31 | Stop reason: HOSPADM

## 2018-08-26 RX ORDER — INSULIN ASPART 100 [IU]/ML
1-10 INJECTION, SOLUTION INTRAVENOUS; SUBCUTANEOUS EVERY 4 HOURS PRN
Status: DISCONTINUED | OUTPATIENT
Start: 2018-08-26 | End: 2018-08-29

## 2018-08-26 RX ORDER — NEBIVOLOL 5 MG/1
5 TABLET ORAL DAILY
Status: DISCONTINUED | OUTPATIENT
Start: 2018-08-26 | End: 2018-08-26

## 2018-08-26 RX ADMIN — PIPERACILLIN AND TAZOBACTAM 4.5 G: 4; .5 INJECTION, POWDER, LYOPHILIZED, FOR SOLUTION INTRAVENOUS; PARENTERAL at 08:08

## 2018-08-26 RX ADMIN — PIPERACILLIN AND TAZOBACTAM 4.5 G: 4; .5 INJECTION, POWDER, LYOPHILIZED, FOR SOLUTION INTRAVENOUS; PARENTERAL at 12:08

## 2018-08-26 RX ADMIN — HYDROMORPHONE HYDROCHLORIDE 0.5 MG: 1 INJECTION, SOLUTION INTRAMUSCULAR; INTRAVENOUS; SUBCUTANEOUS at 08:08

## 2018-08-26 RX ADMIN — METOCLOPRAMIDE 5 MG: 5 INJECTION, SOLUTION INTRAMUSCULAR; INTRAVENOUS at 01:08

## 2018-08-26 RX ADMIN — HYDROMORPHONE HYDROCHLORIDE 0.5 MG: 1 INJECTION, SOLUTION INTRAMUSCULAR; INTRAVENOUS; SUBCUTANEOUS at 05:08

## 2018-08-26 RX ADMIN — ASCORBIC ACID, VITAMIN A PALMITATE, CHOLECALCIFEROL, THIAMINE HYDROCHLORIDE, RIBOFLAVIN-5 PHOSPHATE SODIUM, PYRIDOXINE HYDROCHLORIDE, NIACINAMIDE, DEXPANTHENOL, ALPHA-TOCOPHEROL ACETATE, VITAMIN K1, FOLIC ACID, BIOTIN, CYANOCOBALAMIN: 200; 3300; 200; 6; 3.6; 6; 40; 15; 10; 150; 600; 60; 5 INJECTION, SOLUTION INTRAVENOUS at 05:08

## 2018-08-26 RX ADMIN — PIPERACILLIN AND TAZOBACTAM 4.5 G: 4; .5 INJECTION, POWDER, LYOPHILIZED, FOR SOLUTION INTRAVENOUS; PARENTERAL at 04:08

## 2018-08-26 RX ADMIN — SOYBEAN OIL 250 ML: 20 INJECTION, SOLUTION INTRAVENOUS at 08:08

## 2018-08-26 RX ADMIN — METOCLOPRAMIDE 5 MG: 5 INJECTION, SOLUTION INTRAMUSCULAR; INTRAVENOUS at 05:08

## 2018-08-26 RX ADMIN — METOCLOPRAMIDE 5 MG: 5 INJECTION, SOLUTION INTRAMUSCULAR; INTRAVENOUS at 10:08

## 2018-08-26 RX ADMIN — METHYLPREDNISOLONE SODIUM SUCCINATE 40 MG: 40 INJECTION, POWDER, FOR SOLUTION INTRAMUSCULAR; INTRAVENOUS at 08:08

## 2018-08-26 RX ADMIN — HYDROMORPHONE HYDROCHLORIDE 0.5 MG: 1 INJECTION, SOLUTION INTRAMUSCULAR; INTRAVENOUS; SUBCUTANEOUS at 07:08

## 2018-08-26 RX ADMIN — DEXTROSE 40 MG: 50 INJECTION, SOLUTION INTRAVENOUS at 08:08

## 2018-08-26 RX ADMIN — HYDROMORPHONE HYDROCHLORIDE 0.5 MG: 1 INJECTION, SOLUTION INTRAMUSCULAR; INTRAVENOUS; SUBCUTANEOUS at 03:08

## 2018-08-26 RX ADMIN — IPRATROPIUM BROMIDE AND ALBUTEROL SULFATE 3 ML: .5; 3 SOLUTION RESPIRATORY (INHALATION) at 08:08

## 2018-08-26 RX ADMIN — HYDROMORPHONE HYDROCHLORIDE 0.5 MG: 1 INJECTION, SOLUTION INTRAMUSCULAR; INTRAVENOUS; SUBCUTANEOUS at 11:08

## 2018-08-26 RX ADMIN — HYDROMORPHONE HYDROCHLORIDE 0.5 MG: 1 INJECTION, SOLUTION INTRAMUSCULAR; INTRAVENOUS; SUBCUTANEOUS at 12:08

## 2018-08-26 RX ADMIN — FLUCONAZOLE 100 MG: 2 INJECTION, SOLUTION INTRAVENOUS at 12:08

## 2018-08-26 RX ADMIN — INSULIN ASPART 4 UNITS: 100 INJECTION, SOLUTION INTRAVENOUS; SUBCUTANEOUS at 12:08

## 2018-08-26 RX ADMIN — FENTANYL 1 PATCH: 25 PATCH, EXTENDED RELEASE TRANSDERMAL at 06:08

## 2018-08-26 NOTE — ASSESSMENT & PLAN NOTE
S/P CABG x 5  - home nebivolol currently held- as unable to take PO meds and no meds per G tube at this time either due to aspiration.  - maintain on telemetry

## 2018-08-26 NOTE — PLAN OF CARE
Problem: Physical Therapy Goal  Goal: Physical Therapy Goal  Goals to be met by: 9/7/2018    Patient will increase functional independence with mobility by performing:    Supine <> sit with Modified [place].  Sit <> stand transfer with Modified [place] using No Assistive Device and Rolling Walker.  Bed <> chair transfer via Stand Pivot with Modified [place] using No Assistive Device and Rolling Walker.  Gait  x 150 feet with Contact Guard Assistance using Rolling Walker to prepare for community ambulation and endurance activities.  Dynamic standing for 8 minutes with Contact Guard Assistance using No Assistive Device to prepare for functional tasks in standing.  Able to tolerate exercise for 15-20 reps with independence.     Outcome: Ongoing (interventions implemented as appropriate)  Patient progressing well towards his goals as noted by bed mobility and walking range.

## 2018-08-26 NOTE — PT/OT/SLP PROGRESS
"Physical Therapy Treatment    Patient Name:  Ismael Montiel   MRN:  [id number]    Recommendations:     Discharge Recommendations:  home health PT   Discharge Equipment Recommendations: walker, rolling, wheelchair   Barriers to discharge: None    Assessment:     Ismael Montiel is a 67 y.o. male admitted with a medical diagnosis of Aspiration pneumonia.  He presents with the following impairments/functional limitations:  weakness, impaired endurance, gait instability, impaired cardiopulmonary response to activity . Patient transfers and ambulates slowly, but showed good safety awareness and no significant shortness of breath with exertion.    Rehab Prognosis:  good; patient would benefit from acute skilled PT services to address these deficits and reach maximum level of function.      Recent Surgery: Procedure(s) (LRB):  ESOPHAGOGASTRODUODENOSCOPY (EGD) (N/A)      Plan:     During this hospitalization, patient to be seen 3 x/week to address the above listed problems via gait training, therapeutic activities, therapeutic exercises, neuromuscular re-education  · Plan of Care Expires:  09/24/18   Plan of Care Reviewed with: patient    Subjective     Communicated with nsg prior to session.  Patient found with HOB elevated upon PT entry to room, agreeable to treatment.  Patient states " I feel OK today".      Chief Complaint: fatigue  Patient comments/goals: to go home  Pain/Comfort:  · Pain Rating 1: 0/10  · Pain Rating Post-Intervention 1: 0/10    Patients cultural, spiritual, Zoroastrianism conflicts given the current situation: None stated    Objective:     Patient found with: PICC line, PEG Tube, oxygen, telemetry     General Precautions: Standard, aspiration, fall   Orthopedic Precautions:N/A   Braces: N/A     Functional Mobility:  · Bed Mobility:     · Rolling Right: stand by assistance  · Supine to Sit: stand by assistance  · Sit to Supine: stand by assistance  · Transfers:     · Sit to Stand:  contact guard " assistance with rolling walker  · Bed to Chair: stand by assistance with  rolling walker  using  Stand Pivot  · Gait: 44 ft and 68 ft with RW and CGA with 5 L O2 and IV pole in tow.      AM-PAC 6 CLICK MOBILITY  Turning over in bed (including adjusting bedclothes, sheets and blankets)?: 4  Sitting down on and standing up from a chair with arms (e.g., wheelchair, bedside commode, etc.): 3  Moving from lying on back to sitting on the side of the bed?: 4  Moving to and from a bed to a chair (including a wheelchair)?: 3  Need to walk in hospital room?: 3  Climbing 3-5 steps with a railing?: 2  Basic Mobility Total Score: 19       Therapeutic Activities and Exercises:   Donned/Doffed a gown. Patient sat at EOB to prepare for treatment and for NSG to provide with medication.    Patient left with bed in chair position with all lines intact and call button in reach..    GOALS:   Multidisciplinary Problems     Physical Therapy Goals        Problem: Physical Therapy Goal    Goal Priority Disciplines Outcome Goal Variances Interventions   Physical Therapy Goal     PT, PT/OT Ongoing (interventions implemented as appropriate)     Description:  Goals to be met by: 9/7/2018    Patient will increase functional independence with mobility by performing:    Supine <> sit with Modified [place].  Sit <> stand transfer with Modified [place] using No Assistive Device and Rolling Walker.  Bed <> chair transfer via Stand Pivot with Modified [place] using No Assistive Device and Rolling Walker.  Gait  x 150 feet with Contact Guard Assistance using Rolling Walker to prepare for community ambulation and endurance activities.  Dynamic standing for 8 minutes with Contact Guard Assistance using No Assistive Device to prepare for functional tasks in standing.  Able to tolerate exercise for 15-20 reps with independence.                      Time Tracking:     PT Received On: 08/26/18  PT Start Time: 1345     PT Stop Time: 1412  PT  Total Time (min): 27 min     Billable Minutes: Gait Training 15 and Therapeutic Activity 12    Treatment Type: Treatment  PT/PTA: PTA     PTA Visit Number: 1     Sidney Gurrola PTA  08/26/2018

## 2018-08-26 NOTE — ASSESSMENT & PLAN NOTE
-documented at OSH as reason for IV fluconazole upon transfer (as patient cannot take oral or G tube medications now).  -continue for 14 day course- end date august 30th.

## 2018-08-26 NOTE — ASSESSMENT & PLAN NOTE
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 Candida in sputum not usually treated- on diflucan at OSH on chart check it appears for oropharyngeal thrush at that time   -cont zosyn for 14 day course- end date august 30th  -appears per pulm note at OSH patient was started on IV steroids for pneumonia/high oxygen requirements over there. Cannot convert to PO steroids now due to inability to take P meds/G tube meds- 8/26: change to 20 mg IV methylpred down from 40 mg to slowly titrate off.  - repeat cultures NG  - duo nebs BID with albuterol nebs PRN SOB  - utilizes 2-3L NC at [facility]--> titrating down oxygen now- down to 4-5 L now  - wean supplemental oxygen as tolerated to maintain saturations greater than 92%  - aspiration precautions  - maintain HOB > 30  - NPO until GI eval monday

## 2018-08-26 NOTE — PLAN OF CARE
Problem: Infection, Risk/Actual (Adult)  Goal: Infection Prevention/Resolution  Patient will demonstrate the desired outcomes by discharge/transition of care.  Outcome: Ongoing (interventions implemented as appropriate)   08/26/18 1513   Infection, Risk/Actual (Adult)   Infection Prevention/Resolution making progress toward outcome       Problem: Pressure Ulcer Risk (Delroy Scale) (Adult,Obstetrics,Pediatric)  Goal: Skin Integrity  Patient will demonstrate the desired outcomes by discharge/transition of care.  Outcome: Ongoing (interventions implemented as appropriate)   08/26/18 1513   Pressure Ulcer Risk (Delroy Scale) (Adult,Obstetrics,Pediatric)   Skin Integrity making progress toward outcome

## 2018-08-26 NOTE — ASSESSMENT & PLAN NOTE
- Hgb between high 7 to low 8 throughout admission. Continue to trend   - monitor closely and transfuse if indicated

## 2018-08-26 NOTE — SUBJECTIVE & OBJECTIVE
Past Medical History:   Diagnosis Date    CAD (coronary artery disease) 2014    Cancer     esophagus    Esophageal stenosis     Hyperlipidemia     Hypertension     S/P CABG x 5 2005       Past Surgical History:   Procedure Laterality Date    CARDIAC SURGERY  2005    5 vessel     CORONARY ARTERY BYPASS GRAFT      X 5    CORONARY STENT PLACEMENT      X 1    GASTROSTOMY TUBE PLACEMENT  10/09/2017    HEEL SPUR SURGERY Left 2005     Interval History: No big issues overnight. Slept well. Pain controlled. Is ready for procedures tomorrow and hopeful he can go back to some PO or tube feed intake depending on results from procedure. NO other questions today.            Review of patient's allergies indicates:   Allergen Reactions    Pneumococcal 23-fiona ps vaccine        No current facility-administered medications on file prior to encounter.      Current Outpatient Medications on File Prior to Encounter   Medication Sig    acetaminophen (TYLENOL) 325 MG tablet Take 2 tablets (650 mg total) by mouth every 8 (eight) hours as needed for Temperature greater than (or equal to 101 degree F).    albuterol 90 mcg/actuation inhaler Inhale 2 puffs into the lungs every 4 (four) hours as needed for Wheezing or Shortness of Breath. Rescue    albuterol-ipratropium (DUO-NEB) 2.5 mg-0.5 mg/3 mL nebulizer solution Take 3 mLs by nebulization every 6 (six) hours while awake. Rescue    enoxaparin (LOVENOX) 40 mg/0.4 mL Syrg Inject 0.4 mLs (40 mg total) into the skin once daily.    fentaNYL (DURAGESIC) 25 mcg/hr Place 1 patch onto the skin every 48 hours.    methylPREDNISolone sodium succinate (SOLU-MEDROL) 40 mg/mL SolR Inject 40 mg into the vein once daily.    metoclopramide HCl (REGLAN) 5 MG tablet Take 1 tablet (5 mg total) by mouth 3 (three) times daily.    metoclopramide HCl (REGLAN) 5 mg/mL injection Inject 2 mLs (10 mg total) into the vein every 6 (six) hours.    nebivolol (BYSTOLIC) 5 MG Tab Take 5 mg by mouth once  daily.    ondansetron (ZOFRAN) 8 MG tablet Take 1 tablet (8 mg total) by mouth every 8 (eight) hours as needed for Nausea.    oxyCODONE (ROXICODONE) 5 MG immediate release tablet 1 or 2 tabs per peg Q 3 HOURS PRN pain    piperacillin sodium/tazobactam (PIPERACILLIN-TAZOBACTAM 4.5G/100ML D5W IVPB, READY TO MIX,) Inject 100 mLs (4.5 g total) into the vein every 8 (eight) hours.    prochlorperazine (COMPAZINE) 10 MG tablet Take 1 tablet (10 mg total) by mouth every 6 (six) hours as needed (nausea and vomitting.  Rotate with Zofran if needed.).    sodium chloride 3.5% 3.5 % nebulizer solution Take 4 mLs by nebulization every 12 (twelve) hours. for 10 days    tobramycin (NEBCIN) 40 mg/mL injection Take 7.5 mLs (300 mg total) by nebulization every 12 (twelve) hours.     Family History     Problem Relation (Age of Onset)    Aneurysm Brother    Cancer Father, Sister    Heart disease Mother    No Known Problems Daughter    Rheum arthritis Mother        Tobacco Use    Smoking status: Former Smoker     Packs/day: 0.25     Years: 50.00     Pack years: 12.50     Types: Cigarettes     Start date: 12/4/1964    Smokeless tobacco: Never Used    Tobacco comment: smoking cessation handout given and reviewed   Substance and Sexual Activity    Alcohol use: No     Comment:  none in 8 months    Drug use: No    Sexual activity: Yes     Partners: Female     Review of Systems   Constitutional: Positive for fatigue and unexpected weight change. Negative for chills and fever.   HENT: Positive for trouble swallowing. Negative for congestion.    Eyes: Negative for discharge and visual disturbance.   Respiratory: Negative for cough, chest tightness and shortness of breath.         Oxygen dependent    Cardiovascular: Positive for chest pain. Negative for palpitations and leg swelling.   Gastrointestinal: Positive for constipation, nausea and vomiting. Negative for abdominal distention and abdominal pain.   Genitourinary: Negative for  dysuria and frequency.   Musculoskeletal: Positive for myalgias. Negative for joint swelling.   Skin: Negative for color change and rash.   Neurological: Negative for seizures and syncope.     Objective:     Vital Signs (Most Recent):  Temp: 98.3 °F (36.8 °C) (08/26/18 1550)  Pulse: 75 (08/26/18 1550)  Resp: 17 (08/26/18 1550)  BP: 115/66 (08/26/18 1550)  SpO2: 97 % (08/26/18 1550) Vital Signs (24h Range):  Temp:  [97.8 °F (36.6 °C)-98.9 °F (37.2 °C)] 98.3 °F (36.8 °C)  Pulse:  [63-87] 75  Resp:  [17-19] 17  SpO2:  [91 %-99 %] 97 %  BP: (115-154)/(66-76) 115/66     Weight: 48.6 kg (107 lb 2.3 oz)  Body mass index is 17.29 kg/m².    Physical Exam   Constitutional: He is oriented to person, place, and time. He appears well-developed. He appears cachectic. He is cooperative. He has a sickly appearance. Nasal cannula in place.   HENT:   Head: Normocephalic and atraumatic.   RIJ TLC   Eyes: EOM are normal. Pupils are equal, round, and reactive to light.   Neck: Normal range of motion. Neck supple.   Cardiovascular: Normal rate, regular rhythm, normal heart sounds and intact distal pulses.   No murmur heard.  Pulmonary/Chest: Effort normal. No respiratory distress. He has rales in the right middle field and the right lower field. He exhibits tenderness.   Abdominal: Soft. Bowel sounds are normal. He exhibits no distension. There is no tenderness.   PEG to LUQ    Musculoskeletal: Normal range of motion. He exhibits no edema.   Neurological: He is alert and oriented to person, place, and time.   Skin: Skin is warm and dry.   Fentanyl patch to L anterior chest wall    Psychiatric: He has a normal mood and affect. His behavior is normal.   Nursing note and vitals reviewed.        CRANIAL NERVES     CN III, IV, VI   Pupils are equal, round, and reactive to light.  Extraocular motions are normal.        Significant Labs:   CBC:   Recent Labs   Lab  08/25/18   0452  08/26/18   0407   WBC  20.25*  21.59*   HGB  7.8*  8.1*   HCT   25.6*  25.7*   PLT  612*  610*     CMP:   Recent Labs   Lab  08/25/18   0452  08/26/18   0407   NA  134*  132*   K  4.3  4.1   CL  102  101   CO2  27  27   GLU  177*  173*   BUN  23  22   CREATININE  0.6  0.6   CALCIUM  9.1  9.1   PROT  5.5*  5.4*   ALBUMIN  1.9*  1.9*   BILITOT  0.3  0.3   ALKPHOS  91  96   AST  19  41*   ALT  30  51*   ANIONGAP  5*  4*   EGFRNONAA  >60.0  >60.0       Significant Imaging: I have reviewed all pertinent imaging results/findings within the past 24 hours.

## 2018-08-26 NOTE — PLAN OF CARE
Problem: Patient Care Overview  Goal: Plan of Care Review  Outcome: Ongoing (interventions implemented as appropriate)  Report received, care assumed. See flow sheet for full systems assessment. On-going IVAB/ Lipids/ TPN. Pain well controlled w Q4 prn IVP dilaudid. Slept well after 9:45pm dose. Remains on NC high flow at 8 L/M w no c/o SOB and adequate SAO2. No family at the bedside this shift. No MD visits. Will continue to monitor.

## 2018-08-26 NOTE — PROGRESS NOTES
[facility] Hospital Medicine  Progress Note    Patient Name: Ismael Montiel  MRN: [id number]  Patient Class: IP- Inpatient   Admission Date: 8/24/2018  Length of Stay: 2 days  Attending Physician: Milagros Dhillon MD  Primary Care Provider: Deejay Heck Jr, MD    Hospital Medicine Team: [facility] Milagros Dhillon MD    Subjective:     Principal Problem:Aspiration pneumonia    HPI:  66 y/o gentleman, who was transferred from [facility] for GI evaluation.  He has a PMH of lung cancer resulting in esophageal compression s/p stenting, chronic hypoxic respiratory failure requiring supplemental oxygen at 2-3L NC, HTN, and CAD.  He states that symptoms started around the time the stent was placed on 07/20/18.  On 08/17/18 he presented to [facility] and was found to have patchy bilateral infiltrates on CXR.  He was diagnosed with aspiration pneumonia and started empirically on ABX. Respiratory cultures were positive for pseudomonas and candida. He denies fever, chills, or diarrhea. His O2 requirements have increased and he is currently on 8L high flow NC.  He endorses stable SOB, chest / epigastric discomfort, nausea, feeling of abdominal fullness, and constipation.  He has been on TPN d/t aspiration concerns.  He reports that his PEG tube was replaced 08/23/18 d/t faulty balloon.  He underwent an esophagram and CT abd earlier today which suggested the esophageal stent was patent. CT was concerning for impaired gastric emptying and constipation.    Hospital Course:  8/24: GI consulted- plan for EGD, possible G to J tube conversion on Monday. Patient comfortable on exam. No distress. Plans discussed  8/25: titrated from 10L NC to 8L NC. No issues overnight. Pain well controlled with dilaudid. PT/OT consulted for recommendations  8:26: down to 4-5L NC today. Feeling improved overall. Titrated IV steroids down today. Plan for 14 day course of fluconazole for thrush hx, zosyn for  pneumonia total.    Past Medical History:   Diagnosis Date    CAD (coronary artery disease) 2014    Cancer     esophagus    Esophageal stenosis     Hyperlipidemia     Hypertension     S/P CABG x 5 2005       Past Surgical History:   Procedure Laterality Date    CARDIAC SURGERY  2005    5 vessel     CORONARY ARTERY BYPASS GRAFT      X 5    CORONARY STENT PLACEMENT      X 1    GASTROSTOMY TUBE PLACEMENT  10/09/2017    HEEL SPUR SURGERY Left 2005     Interval History: No big issues overnight. Slept well. Pain controlled. Is ready for procedures tomorrow and hopeful he can go back to some PO or tube feed intake depending on results from procedure. NO other questions today.            Review of patient's allergies indicates:   Allergen Reactions    Pneumococcal 23-fiona ps vaccine        No current facility-administered medications on file prior to encounter.      Current Outpatient Medications on File Prior to Encounter   Medication Sig    acetaminophen (TYLENOL) 325 MG tablet Take 2 tablets (650 mg total) by mouth every 8 (eight) hours as needed for Temperature greater than (or equal to 101 degree F).    albuterol 90 mcg/actuation inhaler Inhale 2 puffs into the lungs every 4 (four) hours as needed for Wheezing or Shortness of Breath. Rescue    albuterol-ipratropium (DUO-NEB) 2.5 mg-0.5 mg/3 mL nebulizer solution Take 3 mLs by nebulization every 6 (six) hours while awake. Rescue    enoxaparin (LOVENOX) 40 mg/0.4 mL Syrg Inject 0.4 mLs (40 mg total) into the skin once daily.    fentaNYL (DURAGESIC) 25 mcg/hr Place 1 patch onto the skin every 48 hours.    methylPREDNISolone sodium succinate (SOLU-MEDROL) 40 mg/mL SolR Inject 40 mg into the vein once daily.    metoclopramide HCl (REGLAN) 5 MG tablet Take 1 tablet (5 mg total) by mouth 3 (three) times daily.    metoclopramide HCl (REGLAN) 5 mg/mL injection Inject 2 mLs (10 mg total) into the vein every 6 (six) hours.    nebivolol (BYSTOLIC) 5 MG Tab Take  5 mg by mouth once daily.    ondansetron (ZOFRAN) 8 MG tablet Take 1 tablet (8 mg total) by mouth every 8 (eight) hours as needed for Nausea.    oxyCODONE (ROXICODONE) 5 MG immediate release tablet 1 or 2 tabs per peg Q 3 HOURS PRN pain    piperacillin sodium/tazobactam (PIPERACILLIN-TAZOBACTAM 4.5G/100ML D5W IVPB, READY TO MIX,) Inject 100 mLs (4.5 g total) into the vein every 8 (eight) hours.    prochlorperazine (COMPAZINE) 10 MG tablet Take 1 tablet (10 mg total) by mouth every 6 (six) hours as needed (nausea and vomitting.  Rotate with Zofran if needed.).    sodium chloride 3.5% 3.5 % nebulizer solution Take 4 mLs by nebulization every 12 (twelve) hours. for 10 days    tobramycin (NEBCIN) 40 mg/mL injection Take 7.5 mLs (300 mg total) by nebulization every 12 (twelve) hours.     Family History     Problem Relation (Age of Onset)    Aneurysm Brother    Cancer Father, Sister    Heart disease Mother    No Known Problems Daughter    Rheum arthritis Mother        Tobacco Use    Smoking status: Former Smoker     Packs/day: 0.25     Years: 50.00     Pack years: 12.50     Types: Cigarettes     Start date: 12/4/1964    Smokeless tobacco: Never Used    Tobacco comment: smoking cessation handout given and reviewed   Substance and Sexual Activity    Alcohol use: No     Comment:  none in 8 months    Drug use: No    Sexual activity: Yes     Partners: Female     Review of Systems   Constitutional: Positive for fatigue and unexpected weight change. Negative for chills and fever.   HENT: Positive for trouble swallowing. Negative for congestion.    Eyes: Negative for discharge and visual disturbance.   Respiratory: Negative for cough, chest tightness and shortness of breath.         Oxygen dependent    Cardiovascular: Positive for chest pain. Negative for palpitations and leg swelling.   Gastrointestinal: Positive for constipation, nausea and vomiting. Negative for abdominal distention and abdominal pain.    Genitourinary: Negative for dysuria and frequency.   Musculoskeletal: Positive for myalgias. Negative for joint swelling.   Skin: Negative for color change and rash.   Neurological: Negative for seizures and syncope.     Objective:     Vital Signs (Most Recent):  Temp: 98.3 °F (36.8 °C) (08/26/18 1550)  Pulse: 75 (08/26/18 1550)  Resp: 17 (08/26/18 1550)  BP: 115/66 (08/26/18 1550)  SpO2: 97 % (08/26/18 1550) Vital Signs (24h Range):  Temp:  [97.8 °F (36.6 °C)-98.9 °F (37.2 °C)] 98.3 °F (36.8 °C)  Pulse:  [63-87] 75  Resp:  [17-19] 17  SpO2:  [91 %-99 %] 97 %  BP: (115-154)/(66-76) 115/66     Weight: 48.6 kg (107 lb 2.3 oz)  Body mass index is 17.29 kg/m².    Physical Exam   Constitutional: He is oriented to person, place, and time. He appears well-developed. He appears cachectic. He is cooperative. He has a sickly appearance. Nasal cannula in place.   HENT:   Head: Normocephalic and atraumatic.   RIJ TLC   Eyes: EOM are normal. Pupils are equal, round, and reactive to light.   Neck: Normal range of motion. Neck supple.   Cardiovascular: Normal rate, regular rhythm, normal heart sounds and intact distal pulses.   No murmur heard.  Pulmonary/Chest: Effort normal. No respiratory distress. He has rales in the right middle field and the right lower field. He exhibits tenderness.   Abdominal: Soft. Bowel sounds are normal. He exhibits no distension. There is no tenderness.   PEG to LUQ    Musculoskeletal: Normal range of motion. He exhibits no edema.   Neurological: He is alert and oriented to person, place, and time.   Skin: Skin is warm and dry.   Fentanyl patch to L anterior chest wall    Psychiatric: He has a normal mood and affect. His behavior is normal.   Nursing note and vitals reviewed.        CRANIAL NERVES     CN III, IV, VI   Pupils are equal, round, and reactive to light.  Extraocular motions are normal.        Significant Labs:   CBC:   Recent Labs   Lab  08/25/18   0452  08/26/18   0407   WBC  20.25*   21.59*   HGB  7.8*  8.1*   HCT  25.6*  25.7*   PLT  612*  610*     CMP:   Recent Labs   Lab  08/25/18   0452  08/26/18   0407   NA  134*  132*   K  4.3  4.1   CL  102  101   CO2  27  27   GLU  177*  173*   BUN  23  22   CREATININE  0.6  0.6   CALCIUM  9.1  9.1   PROT  5.5*  5.4*   ALBUMIN  1.9*  1.9*   BILITOT  0.3  0.3   ALKPHOS  91  96   AST  19  41*   ALT  30  51*   ANIONGAP  5*  4*   EGFRNONAA  >60.0  >60.0       Significant Imaging: I have reviewed all pertinent imaging results/findings within the past 24 hours.    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 Candida in sputum not usually treated- on diflucan at OSH on chart check it appears for oropharyngeal thrush at that time   -cont zosyn for 14 day course- end date august 30th  -appears per pulm note at OSH patient was started on IV steroids for pneumonia/high oxygen requirements over there. Cannot convert to PO steroids now due to inability to take P meds/G tube meds- 8/26: change to 20 mg IV methylpred down from 40 mg to slowly titrate off.  - repeat cultures NG  - duo nebs BID with albuterol nebs PRN SOB  - utilizes 2-3L NC at [facility]--> titrating down oxygen now- down to 4-5 L now  - wean supplemental oxygen as tolerated to maintain saturations greater than 92%  - aspiration precautions  - maintain HOB > 30  - NPO until GI eval monday        Thrush    -documented at OSH as reason for IV fluconazole upon transfer (as patient cannot take oral or G tube medications now).  -continue for 14 day course- end date august 30th.          DNR (do not resuscitate)              Constipation    - BM reported today.   -monitor closely while on opiates        Lung  "cancer metastatic to bone    - hx of lung cancer resulting in esophageal compression  - s/p radiation therapy   - resume fentanyl patch and PRN pain management   - wean supplemental oxygen as tolerated         Anemia, chronic disease    - Hgb between high 7 to low 8 throughout admission. 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 NPO and no feeds per G tube now until procedure Monday- on TPN for nutrition now.  -patient says reglan has not helped with gastric emptying at the other hospital  - PRN antiemetics    - continue TPN        Severe malnutrition    - cachetic appearing  - reports recent 25-30 weight loss  - continue TPN for now while awaiting GI evaluation   - appreciate nutrition assistance with TPN  -tube feeds- was doing bolus feeds per G tube at home- at OSH was doing trickle feeds but patient reports immediate "gurgling into lungs' when they were doing that, so he is currently not receiving any feeds per G tube and strict NPO until GI procedures on Monday. 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  - maintain on telemetry             VTE Risk Mitigation (From admission, onward)        Ordered     Place sequential compression device  Until discontinued      08/24/18 0452     Place BRUCE hose  Until discontinued      08/24/18 0452          DIspo: EGD tomorrow- possible G to J tube exchange, possible esophageal stent exchange.   Fluconazole until aug 30. Zosyn until aug 30. titrating down IV steroids as unable to convert to PO now. Needs nebivolol started at some point for history of SVT- cannot take PO or G tube meds right now.  dispo will strongly depend on what happens with GI tomorrow. Likely discharge mid this week if things have targets to intervene on in procedure tomorrow.        Milagros Dhillon MD  Department of Hospital Medicine   [facility]  "

## 2018-08-27 ENCOUNTER — ANESTHESIA (OUTPATIENT)
Dept: ENDOSCOPY | Facility: HOSPITAL | Age: 68
DRG: 177 | End: 2018-08-27
Payer: MEDICARE

## 2018-08-27 PROBLEM — K31.1 GASTRIC OUTLET OBSTRUCTION: Status: ACTIVE | Noted: 2018-08-27

## 2018-08-27 PROBLEM — K31.1 PYLORIC STENOSIS: Status: ACTIVE | Noted: 2018-08-27

## 2018-08-27 LAB
ALBUMIN SERPL BCP-MCNC: 2 G/DL
ALP SERPL-CCNC: 100 U/L
ALT SERPL W/O P-5'-P-CCNC: 52 U/L
ANION GAP SERPL CALC-SCNC: 5 MMOL/L
ANISOCYTOSIS BLD QL SMEAR: SLIGHT
AST SERPL-CCNC: 22 U/L
BASOPHILS # BLD AUTO: 0.12 K/UL
BASOPHILS NFR BLD: 0.4 %
BILIRUB SERPL-MCNC: 0.4 MG/DL
BUN SERPL-MCNC: 23 MG/DL
CALCIUM SERPL-MCNC: 9.1 MG/DL
CHLORIDE SERPL-SCNC: 101 MMOL/L
CO2 SERPL-SCNC: 26 MMOL/L
CREAT SERPL-MCNC: 0.7 MG/DL
DIFFERENTIAL METHOD: ABNORMAL
EOSINOPHIL # BLD AUTO: 0 K/UL
EOSINOPHIL NFR BLD: 0.1 %
ERYTHROCYTE [DISTWIDTH] IN BLOOD BY AUTOMATED COUNT: 17.1 %
EST. GFR  (AFRICAN AMERICAN): >60 ML/MIN/1.73 M^2
EST. GFR  (NON AFRICAN AMERICAN): >60 ML/MIN/1.73 M^2
GLUCOSE SERPL-MCNC: 171 MG/DL
HCT VFR BLD AUTO: 25.9 %
HGB BLD-MCNC: 8.1 G/DL
HYPOCHROMIA BLD QL SMEAR: ABNORMAL
IMM GRANULOCYTES # BLD AUTO: 1.13 K/UL
IMM GRANULOCYTES NFR BLD AUTO: 3.8 %
LYMPHOCYTES # BLD AUTO: 1 K/UL
LYMPHOCYTES NFR BLD: 3.3 %
MAGNESIUM SERPL-MCNC: 2.1 MG/DL
MCH RBC QN AUTO: 25.9 PG
MCHC RBC AUTO-ENTMCNC: 31.3 G/DL
MCV RBC AUTO: 83 FL
MONOCYTES # BLD AUTO: 2 K/UL
MONOCYTES NFR BLD: 6.7 %
NEUTROPHILS # BLD AUTO: 25.6 K/UL
NEUTROPHILS NFR BLD: 85.7 %
NRBC BLD-RTO: 0 /100 WBC
OVALOCYTES BLD QL SMEAR: ABNORMAL
PHOSPHATE SERPL-MCNC: 2.1 MG/DL
PLATELET # BLD AUTO: 634 K/UL
PMV BLD AUTO: 9.2 FL
POCT GLUCOSE: 172 MG/DL (ref 70–110)
POCT GLUCOSE: 180 MG/DL (ref 70–110)
POCT GLUCOSE: 180 MG/DL (ref 70–110)
POCT GLUCOSE: 201 MG/DL (ref 70–110)
POIKILOCYTOSIS BLD QL SMEAR: SLIGHT
POLYCHROMASIA BLD QL SMEAR: ABNORMAL
POTASSIUM SERPL-SCNC: 4.1 MMOL/L
PROT SERPL-MCNC: 5.7 G/DL
RBC # BLD AUTO: 3.13 M/UL
SODIUM SERPL-SCNC: 132 MMOL/L
WBC # BLD AUTO: 29.84 K/UL

## 2018-08-27 PROCEDURE — 37000008 HC ANESTHESIA 1ST 15 MINUTES: Performed by: INTERNAL MEDICINE

## 2018-08-27 PROCEDURE — 0D778ZZ DILATION OF STOMACH, PYLORUS, VIA NATURAL OR ARTIFICIAL OPENING ENDOSCOPIC: ICD-10-PCS | Performed by: INTERNAL MEDICINE

## 2018-08-27 PROCEDURE — 63600175 PHARM REV CODE 636 W HCPCS: Performed by: NURSE ANESTHETIST, CERTIFIED REGISTERED

## 2018-08-27 PROCEDURE — 25000003 PHARM REV CODE 250: Performed by: HOSPITALIST

## 2018-08-27 PROCEDURE — 25000242 PHARM REV CODE 250 ALT 637 W/ HCPCS: Performed by: HOSPITALIST

## 2018-08-27 PROCEDURE — 85025 COMPLETE CBC W/AUTO DIFF WBC: CPT

## 2018-08-27 PROCEDURE — 43245 EGD DILATE STRICTURE: CPT | Performed by: INTERNAL MEDICINE

## 2018-08-27 PROCEDURE — 43245 EGD DILATE STRICTURE: CPT | Mod: ,,, | Performed by: INTERNAL MEDICINE

## 2018-08-27 PROCEDURE — 27100171 HC OXYGEN HIGH FLOW UP TO 24 HOURS

## 2018-08-27 PROCEDURE — 25000003 PHARM REV CODE 250: Performed by: NURSE ANESTHETIST, CERTIFIED REGISTERED

## 2018-08-27 PROCEDURE — 27000221 HC OXYGEN, UP TO 24 HOURS

## 2018-08-27 PROCEDURE — 94761 N-INVAS EAR/PLS OXIMETRY MLT: CPT

## 2018-08-27 PROCEDURE — 37000009 HC ANESTHESIA EA ADD 15 MINS: Performed by: INTERNAL MEDICINE

## 2018-08-27 PROCEDURE — D9220A PRA ANESTHESIA: Mod: CRNA,,, | Performed by: NURSE ANESTHETIST, CERTIFIED REGISTERED

## 2018-08-27 PROCEDURE — C9113 INJ PANTOPRAZOLE SODIUM, VIA: HCPCS | Performed by: HOSPITALIST

## 2018-08-27 PROCEDURE — 99232 SBSQ HOSP IP/OBS MODERATE 35: CPT | Mod: ,,, | Performed by: HOSPITALIST

## 2018-08-27 PROCEDURE — 25000003 PHARM REV CODE 250: Performed by: PHYSICIAN ASSISTANT

## 2018-08-27 PROCEDURE — 63600175 PHARM REV CODE 636 W HCPCS: Performed by: HOSPITALIST

## 2018-08-27 PROCEDURE — C1726 CATH, BAL DIL, NON-VASCULAR: HCPCS | Performed by: INTERNAL MEDICINE

## 2018-08-27 PROCEDURE — 11000001 HC ACUTE MED/SURG PRIVATE ROOM

## 2018-08-27 PROCEDURE — B4185 PARENTERAL SOL 10 GM LIPIDS: HCPCS | Performed by: HOSPITALIST

## 2018-08-27 PROCEDURE — 84100 ASSAY OF PHOSPHORUS: CPT

## 2018-08-27 PROCEDURE — 80053 COMPREHEN METABOLIC PANEL: CPT

## 2018-08-27 PROCEDURE — 94640 AIRWAY INHALATION TREATMENT: CPT

## 2018-08-27 PROCEDURE — D9220A PRA ANESTHESIA: Mod: ANES,,, | Performed by: ANESTHESIOLOGY

## 2018-08-27 PROCEDURE — 63600175 PHARM REV CODE 636 W HCPCS: Performed by: ANESTHESIOLOGY

## 2018-08-27 PROCEDURE — 83735 ASSAY OF MAGNESIUM: CPT

## 2018-08-27 RX ORDER — SODIUM CHLORIDE 9 MG/ML
INJECTION, SOLUTION INTRAVENOUS CONTINUOUS PRN
Status: DISCONTINUED | OUTPATIENT
Start: 2018-08-27 | End: 2018-08-27

## 2018-08-27 RX ORDER — HYDROMORPHONE HYDROCHLORIDE 1 MG/ML
INJECTION, SOLUTION INTRAMUSCULAR; INTRAVENOUS; SUBCUTANEOUS
Status: COMPLETED
Start: 2018-08-27 | End: 2018-08-28

## 2018-08-27 RX ORDER — HYDROMORPHONE HYDROCHLORIDE 1 MG/ML
1 INJECTION, SOLUTION INTRAMUSCULAR; INTRAVENOUS; SUBCUTANEOUS EVERY 4 HOURS PRN
Status: DISCONTINUED | OUTPATIENT
Start: 2018-08-27 | End: 2018-08-29

## 2018-08-27 RX ORDER — SODIUM CHLORIDE 0.9 % (FLUSH) 0.9 %
3 SYRINGE (ML) INJECTION
Status: DISCONTINUED | OUTPATIENT
Start: 2018-08-27 | End: 2018-08-27 | Stop reason: HOSPADM

## 2018-08-27 RX ORDER — METOCLOPRAMIDE 10 MG/1
10 TABLET ORAL
Status: DISCONTINUED | OUTPATIENT
Start: 2018-08-27 | End: 2018-08-31 | Stop reason: HOSPADM

## 2018-08-27 RX ORDER — LIDOCAINE HCL/PF 100 MG/5ML
SYRINGE (ML) INTRAVENOUS
Status: DISCONTINUED | OUTPATIENT
Start: 2018-08-27 | End: 2018-08-27

## 2018-08-27 RX ORDER — IPRATROPIUM BROMIDE AND ALBUTEROL SULFATE 2.5; .5 MG/3ML; MG/3ML
3 SOLUTION RESPIRATORY (INHALATION) EVERY 6 HOURS
Status: DISCONTINUED | OUTPATIENT
Start: 2018-08-27 | End: 2018-08-31 | Stop reason: HOSPADM

## 2018-08-27 RX ORDER — PHENYLEPHRINE HYDROCHLORIDE 10 MG/ML
INJECTION INTRAVENOUS
Status: DISCONTINUED | OUTPATIENT
Start: 2018-08-27 | End: 2018-08-27

## 2018-08-27 RX ORDER — HYDROMORPHONE HYDROCHLORIDE 1 MG/ML
0.2 INJECTION, SOLUTION INTRAMUSCULAR; INTRAVENOUS; SUBCUTANEOUS EVERY 5 MIN PRN
Status: DISCONTINUED | OUTPATIENT
Start: 2018-08-27 | End: 2018-08-27 | Stop reason: HOSPADM

## 2018-08-27 RX ORDER — PROPOFOL 10 MG/ML
VIAL (ML) INTRAVENOUS
Status: DISCONTINUED | OUTPATIENT
Start: 2018-08-27 | End: 2018-08-27

## 2018-08-27 RX ORDER — FENTANYL CITRATE 50 UG/ML
INJECTION, SOLUTION INTRAMUSCULAR; INTRAVENOUS
Status: DISCONTINUED | OUTPATIENT
Start: 2018-08-27 | End: 2018-08-27

## 2018-08-27 RX ORDER — SUCCINYLCHOLINE CHLORIDE 20 MG/ML
INJECTION INTRAMUSCULAR; INTRAVENOUS
Status: DISCONTINUED | OUTPATIENT
Start: 2018-08-27 | End: 2018-08-27

## 2018-08-27 RX ORDER — ONDANSETRON 2 MG/ML
INJECTION INTRAMUSCULAR; INTRAVENOUS
Status: DISCONTINUED | OUTPATIENT
Start: 2018-08-27 | End: 2018-08-27

## 2018-08-27 RX ADMIN — FENTANYL 1 PATCH: 25 PATCH, EXTENDED RELEASE TRANSDERMAL at 05:08

## 2018-08-27 RX ADMIN — HYDROMORPHONE HYDROCHLORIDE 0.2 MG: 1 INJECTION, SOLUTION INTRAMUSCULAR; INTRAVENOUS; SUBCUTANEOUS at 11:08

## 2018-08-27 RX ADMIN — ONDANSETRON 4 MG: 2 INJECTION INTRAMUSCULAR; INTRAVENOUS at 10:08

## 2018-08-27 RX ADMIN — HYDROMORPHONE HYDROCHLORIDE 1 MG: 1 INJECTION, SOLUTION INTRAMUSCULAR; INTRAVENOUS; SUBCUTANEOUS at 09:08

## 2018-08-27 RX ADMIN — PROPOFOL 120 MG: 10 INJECTION, EMULSION INTRAVENOUS at 10:08

## 2018-08-27 RX ADMIN — SOYBEAN OIL 250 ML: 20 INJECTION, SOLUTION INTRAVENOUS at 08:08

## 2018-08-27 RX ADMIN — SODIUM CHLORIDE: 0.9 INJECTION, SOLUTION INTRAVENOUS at 10:08

## 2018-08-27 RX ADMIN — IPRATROPIUM BROMIDE AND ALBUTEROL SULFATE 3 ML: .5; 3 SOLUTION RESPIRATORY (INHALATION) at 08:08

## 2018-08-27 RX ADMIN — PHENYLEPHRINE HYDROCHLORIDE 100 MCG: 10 INJECTION INTRAVENOUS at 10:08

## 2018-08-27 RX ADMIN — FLUCONAZOLE 100 MG: 2 INJECTION, SOLUTION INTRAVENOUS at 12:08

## 2018-08-27 RX ADMIN — PIPERACILLIN AND TAZOBACTAM 4.5 G: 4; .5 INJECTION, POWDER, LYOPHILIZED, FOR SOLUTION INTRAVENOUS; PARENTERAL at 09:08

## 2018-08-27 RX ADMIN — RETINOL, ERGOCALCIFEROL, .ALPHA.-TOCOPHEROL ACETATE, DL-, PHYTONADIONE, ASCORBIC ACID, NIACINAMIDE, RIBOFLAVIN 5-PHOSPHATE SODIUM, THIAMINE HYDROCHLORIDE, PYRIDOXINE HYDROCHLORIDE, DEXPANTHENOL, BIOTIN, FOLIC ACID, AND CYANOCOBALAMIN: KIT at 08:08

## 2018-08-27 RX ADMIN — DEXTROSE 40 MG: 50 INJECTION, SOLUTION INTRAVENOUS at 08:08

## 2018-08-27 RX ADMIN — IPRATROPIUM BROMIDE AND ALBUTEROL SULFATE 3 ML: .5; 3 SOLUTION RESPIRATORY (INHALATION) at 09:08

## 2018-08-27 RX ADMIN — METOCLOPRAMIDE 10 MG: 10 TABLET ORAL at 04:08

## 2018-08-27 RX ADMIN — PIPERACILLIN AND TAZOBACTAM 4.5 G: 4; .5 INJECTION, POWDER, LYOPHILIZED, FOR SOLUTION INTRAVENOUS; PARENTERAL at 12:08

## 2018-08-27 RX ADMIN — FENTANYL CITRATE 50 MCG: 50 INJECTION, SOLUTION INTRAMUSCULAR; INTRAVENOUS at 10:08

## 2018-08-27 RX ADMIN — INSULIN ASPART 2 UNITS: 100 INJECTION, SOLUTION INTRAVENOUS; SUBCUTANEOUS at 12:08

## 2018-08-27 RX ADMIN — SUCCINYLCHOLINE CHLORIDE 140 MG: 20 INJECTION, SOLUTION INTRAMUSCULAR; INTRAVENOUS at 10:08

## 2018-08-27 RX ADMIN — HYDROMORPHONE HYDROCHLORIDE 0.5 MG: 1 INJECTION, SOLUTION INTRAMUSCULAR; INTRAVENOUS; SUBCUTANEOUS at 09:08

## 2018-08-27 RX ADMIN — PIPERACILLIN AND TAZOBACTAM 4.5 G: 4; .5 INJECTION, POWDER, LYOPHILIZED, FOR SOLUTION INTRAVENOUS; PARENTERAL at 04:08

## 2018-08-27 RX ADMIN — PHENYLEPHRINE HYDROCHLORIDE 200 MCG: 10 INJECTION INTRAVENOUS at 10:08

## 2018-08-27 RX ADMIN — METOCLOPRAMIDE 5 MG: 5 INJECTION, SOLUTION INTRAMUSCULAR; INTRAVENOUS at 05:08

## 2018-08-27 RX ADMIN — LIDOCAINE HYDROCHLORIDE 100 MG: 20 INJECTION, SOLUTION INTRAVENOUS at 10:08

## 2018-08-27 RX ADMIN — INSULIN ASPART 2 UNITS: 100 INJECTION, SOLUTION INTRAVENOUS; SUBCUTANEOUS at 06:08

## 2018-08-27 RX ADMIN — HYDROMORPHONE HYDROCHLORIDE 1 MG: 1 INJECTION, SOLUTION INTRAMUSCULAR; INTRAVENOUS; SUBCUTANEOUS at 06:08

## 2018-08-27 RX ADMIN — HYDROMORPHONE HYDROCHLORIDE 0.5 MG: 1 INJECTION, SOLUTION INTRAMUSCULAR; INTRAVENOUS; SUBCUTANEOUS at 05:08

## 2018-08-27 RX ADMIN — INSULIN ASPART 4 UNITS: 100 INJECTION, SOLUTION INTRAVENOUS; SUBCUTANEOUS at 08:08

## 2018-08-27 RX ADMIN — INSULIN ASPART 1 UNITS: 100 INJECTION, SOLUTION INTRAVENOUS; SUBCUTANEOUS at 10:08

## 2018-08-27 RX ADMIN — METHYLPREDNISOLONE SODIUM SUCCINATE 20 MG: 40 INJECTION, POWDER, FOR SOLUTION INTRAMUSCULAR; INTRAVENOUS at 08:08

## 2018-08-27 NOTE — PLAN OF CARE
Plan of Care Review  Outcome: Ongoing (interventions implemented as appropriate)  Report received, care assumed. See flow sheet for full systems assessment. On-going IVAB/ Lipids/ TPN. Pain poorly controlled w Q4 prn IVP dilaudid, contacted Eb MCCARTHY and additional dose order received and given w good results. Slept well after 9:45pm dose. Remains on NC high flow at 8 L/M w no c/o SOB and adequate SAO2. No family at the bedside this shift. No MD visits. Pending procedure later today.Will continue to monitor.

## 2018-08-27 NOTE — DISCHARGE INSTRUCTIONS

## 2018-08-27 NOTE — NURSING TRANSFER
Nursing Transfer Note      8/27/2018     [id number]    Transfer viastertcher    Transfer with 02 3l n/c    Transported byt    Medicines sent:no    Chart send with patient: yes    Notified:spouse

## 2018-08-27 NOTE — ASSESSMENT & PLAN NOTE
- cachetic appearing  - reports recent 25-30 weight loss  - continue TPN for now- starting liquid diet, may ultimately utilize G tube + PO and stop TPN on discharge if he can maintain nutrition.   - appreciate nutrition assistance with TPN- nutrition recs for tube feeds in place also if those are needed to resume if doesn't tolerate PO now     History of ITP

## 2018-08-27 NOTE — ASSESSMENT & PLAN NOTE
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 See gastric outlet obstruction for other info

## 2018-08-27 NOTE — PROVATION PATIENT INSTRUCTIONS
Discharge Summary/Instructions after an Endoscopic Procedure  Patient Name: Ismael Montiel  Patient MRN: [id number]  Patient YOB: 1950 Monday, August 27, 2018  Garth Mata MD  RESTRICTIONS:  During your procedure today, you received medications for sedation.  These   medications may affect your judgment, balance and coordination.  Therefore,   for 24 hours, you have the following restrictions:   - DO NOT drive a car, operate machinery, make legal/financial decisions,   sign important papers or drink alcohol.    ACTIVITY:  Today: no heavy lifting, straining or running due to procedural   sedation/anesthesia.  The following day: return to full activity including work.  DIET:  Eat and drink normally unless instructed otherwise.     TREATMENT FOR COMMON SIDE EFFECTS:  - Mild abdominal pain, nausea, belching, bloating or excessive gas:  rest,   eat lightly and use a heating pad.  - Sore Throat: treat with throat lozenges and/or gargle with warm salt   water.  - Because air was used during the procedure, expelling large amounts of air   from your rectum or belching is normal.  - If a bowel prep was taken, you may not have a bowel movement for 1-3 days.    This is normal.  SYMPTOMS TO WATCH FOR AND REPORT TO YOUR PHYSICIAN:  1. Abdominal pain or bloating, other than gas cramps.  2. Chest pain.  3. Back pain.  4. Signs of infection such as: chills or fever occurring within 24 hours   after the procedure.  5. Rectal bleeding, which would show as bright red, maroon, or black stools.   (A tablespoon of blood from the rectum is not serious, especially if   hemorrhoids are present.)  6. Vomiting.  7. Weakness or dizziness.  GO DIRECTLY TO THE NEAREST EMERGENCY ROOM IF YOU HAVE ANY OF THE FOLLOWING:      Difficulty breathing              Chills and/or fever over 101 F   Persistent vomiting and/or vomiting blood   Severe abdominal pain   Severe chest pain   Black, tarry stools   Bleeding- more than one  tablespoon   Any other symptom or condition that you feel may need urgent attention  Your doctor recommends these additional instructions:  If any biopsies were taken, your doctors clinic will contact you in 1 to 2   weeks with any results.  - Return patient to [facility] for ongoing care.   - Use Reglan (metoclopramide) 10 mg PO TID today.   - Start clear liquid diet today. Follow symptoms and check residuals. If no   high residuals (can be checked through exisiting PEG), may advanced as   tolerated to no more than a mechanical soft diet.  - May consider exchange to esophageal stent to a larger diameter, partially   covered model depending on how he tolerates a mechanical soft diet.  - If there large residuals with liquid meals, may consider trial of low   volume liquid nutrition (continuous feedings via PEG vs oral low   volume.high frequency intake) vs. placement of jejunal tube extension.  For questions, problems or results please call your physician - Garth Mata MD at Work:  [phone].  [facility], EMERGENCY ROOM PHONE NUMBER: [phone]  IF A COMPLICATION OR EMERGENCY SITUATION ARISES AND YOU ARE UNABLE TO REACH   YOUR PHYSICIAN - GO DIRECTLY TO THE EMERGENCY ROOM.  Garth Mata MD  8/27/2018 11:29:49 AM  This report has been verified and signed electronically.  [employer]

## 2018-08-27 NOTE — TRANSFER OF CARE
"Anesthesia Transfer of Care Note    Patient: Ismael Montiel    Procedure(s) Performed: Procedure(s) (LRB):  ESOPHAGOGASTRODUODENOSCOPY (EGD) (N/A)    Patient location: PACU    Anesthesia Type: general    Transport from OR: Transported from OR on 6-10 L/min O2 by face mask with adequate spontaneous ventilation    Post pain: adequate analgesia    Post assessment: no apparent anesthetic complications and tolerated procedure well    Post vital signs: stable    Level of consciousness: awake, alert and oriented    Nausea/Vomiting: no nausea/vomiting    Complications: none    Transfer of care protocol was followed      Last vitals:   Visit Vitals  BP (!) 102/46   Pulse 76   Temp 36.7 °C (98.1 °F) (Temporal)   Resp 15   Ht 5' 6" (1.676 m)   Wt 48.6 kg (107 lb 2.3 oz)   SpO2 95%   BMI 17.29 kg/m²     "

## 2018-08-27 NOTE — ASSESSMENT & PLAN NOTE
S/P CABG x 5  - home nebivolol for hx of CAD/CABG as well as history of SVT in the past currently held- as unable to take PO meds and no meds per G tube at this time either due to aspiration-> if tolerates oral meds now or G tube feeds can consider resuming in the next few days  - maintain on telemetry

## 2018-08-27 NOTE — PLAN OF CARE
Problem: Patient Care Overview  Goal: Plan of Care Review  Outcome: Ongoing (interventions implemented as appropriate)  POC reviewed with pt & spouse. AAO x4. EGD done. Diet advanced to clear liquids, pt tolerated well. Blood glucose monitoring. Telemetry monitoring.  Pt remained free from falls. Questions and concerns addressed. Pt progressing towards goals. Will continue to monitor. See flow sheets for full assessment and VS

## 2018-08-27 NOTE — INTERVAL H&P NOTE
The patient has been examined and the H&P has been reviewed:    I concur with the findings and no changes have occurred since H&P was written.    Anesthesia/Surgery risks, benefits and alternative options discussed and understood by patient/family.          Active Hospital Problems    Diagnosis  POA    *Aspiration pneumonia [J69.0]  Yes    Thrush [B37.0]  Yes    Constipation [K59.00]  Yes    DNR (do not resuscitate) [Z66]  Yes    Gastroesophageal reflux disease with esophagitis [K21.0]  Yes    Chronic pain due to neoplasm [G89.3]  Yes    Acute on chronic respiratory failure with hypoxia [J96.21]  Yes    Lung cancer metastatic to bone [C34.90, C79.51]  Yes    Anemia, chronic disease [D63.8]  Yes    Esophageal stricture [K22.2]  Yes    Severe malnutrition [E43]  Yes    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]  Yes    Dysphagia [R13.10]  Yes    CAD (coronary artery disease) [I25.10]  Yes    S/P CABG x 5 [Z95.1]  Not Applicable      Resolved Hospital Problems   No resolved problems to display.

## 2018-08-27 NOTE — PROGRESS NOTES
[facility] Hospital Medicine  Progress Note    Patient Name: Ismael Montiel  MRN: [id number]  Patient Class: IP- Inpatient   Admission Date: 8/24/2018  Length of Stay: 3 days  Attending Physician: Milagros Dhillon MD  Primary Care Provider: Deejay Heck Jr, MD    Hospital Medicine Team: [facility] HOSP MED K Milagros Dhillon MD    Subjective:     Principal Problem:Aspiration pneumonia    HPI:  68 y/o gentleman, who was transferred from [facility] for GI evaluation.  He has a PMH of lung cancer resulting in esophageal compression s/p stenting, chronic hypoxic respiratory failure requiring supplemental oxygen at 2-3L NC, HTN, and CAD.  He states that symptoms started around the time the stent was placed on 07/20/18.  On 08/17/18 he presented to [facility] and was found to have patchy bilateral infiltrates on CXR.  He was diagnosed with aspiration pneumonia and started empirically on ABX. Respiratory cultures were positive for pseudomonas and candida. He denies fever, chills, or diarrhea. His O2 requirements have increased and he is currently on 8L high flow NC.  He endorses stable SOB, chest / epigastric discomfort, nausea, feeling of abdominal fullness, and constipation.  He has been on TPN d/t aspiration concerns.  He reports that his PEG tube was replaced 08/23/18 d/t faulty balloon.  He underwent an esophagram and CT abd earlier today which suggested the esophageal stent was patent. CT was concerning for impaired gastric emptying and constipation.    Hospital Course:  8/24: GI consulted- plan for EGD, possible G to J tube conversion on Monday. Patient comfortable on exam. No distress. Plans discussed  8/25: titrated from 10L NC to 8L NC. No issues overnight. Pain well controlled with dilaudid. PT/OT consulted for recommendations  8:26: down to 4-5L NC today. Feeling improved overall. Titrated IV steroids down today. Plan for 14 day course of fluconazole for thrush hx, zosyn for  pneumonia total.  8/27: EGD showing large amount of food residue in stomach with gastric stenosis at pylorus from extrinsic severe stenosis/compression causing the partial outlet obstruction with balloon dilitation. Stent in place. GI recs for clear liquid diet, check G tube residuals, start reglan 10 mg TID PO.     Past Medical History:   Diagnosis Date    CAD (coronary artery disease) 2014    Cancer     esophagus    Esophageal stenosis     Hyperlipidemia     Hypertension     S/P CABG x 5 2005       Past Surgical History:   Procedure Laterality Date    CARDIAC SURGERY  2005    5 vessel     CORONARY ARTERY BYPASS GRAFT      X 5    CORONARY STENT PLACEMENT      X 1    GASTROSTOMY TUBE PLACEMENT  10/09/2017    HEEL SPUR SURGERY Left 2005     Interval History: did well with EGD. Patient is excited to try clear liquids today. He denies SOB, worsening sputum today. Denies fevers overnight. Wife at bedside- both are aware of GI's plan if he tolerates/does not tolerate liquid diet today. Pain controlled with IV and oral meds.               Review of patient's allergies indicates:   Allergen Reactions    Pneumococcal 23-fiona ps vaccine        No current facility-administered medications on file prior to encounter.      Current Outpatient Medications on File Prior to Encounter   Medication Sig    acetaminophen (TYLENOL) 325 MG tablet Take 2 tablets (650 mg total) by mouth every 8 (eight) hours as needed for Temperature greater than (or equal to 101 degree F).    albuterol 90 mcg/actuation inhaler Inhale 2 puffs into the lungs every 4 (four) hours as needed for Wheezing or Shortness of Breath. Rescue    albuterol-ipratropium (DUO-NEB) 2.5 mg-0.5 mg/3 mL nebulizer solution Take 3 mLs by nebulization every 6 (six) hours while awake. Rescue    enoxaparin (LOVENOX) 40 mg/0.4 mL Syrg Inject 0.4 mLs (40 mg total) into the skin once daily.    fentaNYL (DURAGESIC) 25 mcg/hr Place 1 patch onto the skin every 48 hours.     methylPREDNISolone sodium succinate (SOLU-MEDROL) 40 mg/mL SolR Inject 40 mg into the vein once daily.    metoclopramide HCl (REGLAN) 5 MG tablet Take 1 tablet (5 mg total) by mouth 3 (three) times daily.    metoclopramide HCl (REGLAN) 5 mg/mL injection Inject 2 mLs (10 mg total) into the vein every 6 (six) hours.    nebivolol (BYSTOLIC) 5 MG Tab Take 5 mg by mouth once daily.    ondansetron (ZOFRAN) 8 MG tablet Take 1 tablet (8 mg total) by mouth every 8 (eight) hours as needed for Nausea.    oxyCODONE (ROXICODONE) 5 MG immediate release tablet 1 or 2 tabs per peg Q 3 HOURS PRN pain    piperacillin sodium/tazobactam (PIPERACILLIN-TAZOBACTAM 4.5G/100ML D5W IVPB, READY TO MIX,) Inject 100 mLs (4.5 g total) into the vein every 8 (eight) hours.    prochlorperazine (COMPAZINE) 10 MG tablet Take 1 tablet (10 mg total) by mouth every 6 (six) hours as needed (nausea and vomitting.  Rotate with Zofran if needed.).    sodium chloride 3.5% 3.5 % nebulizer solution Take 4 mLs by nebulization every 12 (twelve) hours. for 10 days    tobramycin (NEBCIN) 40 mg/mL injection Take 7.5 mLs (300 mg total) by nebulization every 12 (twelve) hours.     Family History     Problem Relation (Age of Onset)    Aneurysm Brother    Cancer Father, Sister    Heart disease Mother    No Known Problems Daughter    Rheum arthritis Mother        Tobacco Use    Smoking status: Former Smoker     Packs/day: 0.25     Years: 50.00     Pack years: 12.50     Types: Cigarettes     Start date: 12/4/1964    Smokeless tobacco: Never Used    Tobacco comment: smoking cessation handout given and reviewed   Substance and Sexual Activity    Alcohol use: No     Comment:  none in 8 months    Drug use: No    Sexual activity: Yes     Partners: Female     Review of Systems   Constitutional: Positive for fatigue and unexpected weight change. Negative for chills and fever.   HENT: Positive for trouble swallowing. Negative for congestion.    Eyes: Negative  for discharge and visual disturbance.   Respiratory: Negative for cough, chest tightness and shortness of breath.         Oxygen dependent    Cardiovascular: Positive for chest pain. Negative for palpitations and leg swelling.   Gastrointestinal: Positive for constipation, nausea and vomiting. Negative for abdominal distention and abdominal pain.   Genitourinary: Negative for dysuria and frequency.   Musculoskeletal: Positive for myalgias. Negative for joint swelling.   Skin: Negative for color change and rash.   Neurological: Negative for seizures and syncope.     Objective:     Vital Signs (Most Recent):  Temp: 98.5 °F (36.9 °C) (08/27/18 1539)  Pulse: 75 (08/27/18 1539)  Resp: 18 (08/27/18 1539)  BP: 115/64 (08/27/18 1539)  SpO2: (!) 92 % (08/27/18 1539) Vital Signs (24h Range):  Temp:  [96.5 °F (35.8 °C)-98.5 °F (36.9 °C)] 98.5 °F (36.9 °C)  Pulse:  [67-84] 75  Resp:  [15-20] 18  SpO2:  [91 %-98 %] 92 %  BP: ()/(46-81) 115/64     Weight: 48.6 kg (107 lb 2.3 oz)  Body mass index is 17.29 kg/m².    Physical Exam   Constitutional: He is oriented to person, place, and time. He appears well-developed. He appears cachectic. He is cooperative. He has a sickly appearance. Nasal cannula in place.   HENT:   Head: Normocephalic and atraumatic.   RIJ TLC   Eyes: EOM are normal. Pupils are equal, round, and reactive to light.   Neck: Normal range of motion. Neck supple.   Cardiovascular: Normal rate, regular rhythm, normal heart sounds and intact distal pulses.   No murmur heard.  Pulmonary/Chest: Effort normal. No respiratory distress. He has rales in the right middle field and the right lower field. He exhibits tenderness.   Abdominal: Soft. Bowel sounds are normal. He exhibits no distension. There is no tenderness.   PEG to LUQ    Musculoskeletal: Normal range of motion. He exhibits no edema.   Neurological: He is alert and oriented to person, place, and time.   Skin: Skin is warm and dry.   Fentanyl patch to L  anterior chest wall    Psychiatric: He has a normal mood and affect. His behavior is normal.   Nursing note and vitals reviewed.        CRANIAL NERVES     CN III, IV, VI   Pupils are equal, round, and reactive to light.  Extraocular motions are normal.        Significant Labs:   CBC:   Recent Labs   Lab  08/26/18 0407  08/27/18   0442   WBC  21.59*  29.84*   HGB  8.1*  8.1*   HCT  25.7*  25.9*   PLT  610*  634*     CMP:   Recent Labs   Lab  08/26/18   0407  08/27/18   0442   NA  132*  132*   K  4.1  4.1   CL  101  101   CO2  27  26   GLU  173*  171*   BUN  22  23   CREATININE  0.6  0.7   CALCIUM  9.1  9.1   PROT  5.4*  5.7*   ALBUMIN  1.9*  2.0*   BILITOT  0.3  0.4   ALKPHOS  96  100   AST  41*  22   ALT  51*  52*   ANIONGAP  4*  5*   EGFRNONAA  >60.0  >60.0       Significant Imaging: I have reviewed all pertinent imaging results/findings within the past 24 hours.    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 Candida in sputum not usually treated- on diflucan at OSH on chart check it appears for oropharyngeal thrush at that time   -cont zosyn for 14 day course- end date august 30th  -appears per pulm note at OSH patient was started on IV steroids for pneumonia/high oxygen requirements over there. Cannot convert to PO steroids now due to inability to take P meds/G tube meds- 8/26: change to 20 mg IV methylpred down from 40 mg to slowly titrate off.  - repeat cultures NG  - duo nebs BID with albuterol nebs PRN SOB  - utilizes 2-3L NC at [facility]--> titrating down oxygen now- down to 3-4 L now  - wean supplemental oxygen as tolerated to maintain saturations greater than 92%  - aspiration precautions  - maintain  HOB > 30  - 8/27 start clear liquid diet per GI recs. WBC increased from 21-->29 today without bands, no signs of worsening aspiration or new infection. Will continue to trend.        Gastric outlet obstruction    - CT at OSH concerning for impaired gastric emptying and constipation  - GI consulted- EGD showing pyloric stenosis from extrinsic compression- balloon dilitation performed with GI  -clear liquid diet now. reglan 10 mg TID PO.  -if tolerates liquid f diet per GI can advance to mechanical soft diet next. Check residuals per G tube. If large residuals then can consider continuous tube feeds as next option vs lower volume oral feeding with higher frequency oral intake vs  Placement of J tube extension. Other consideration may be to exchange esophageal stent to a larger diameter if he does not tolerate liquid diet            Pyloric stenosis    -dilitation on EGD 8/27. Likely contributor to gastric outlet obstruction          Thrush    -documented at OSH as reason for IV fluconazole upon transfer (as patient cannot take oral or G tube medications now).  -continue for 14 day course- end date august 30th. Can consider changing to oral fluconazole if patient tolerates oral intake now.          DNR (do not resuscitate)              Constipation    - BM reported today.   -monitor closely while on opiates        Lung cancer metastatic to bone    - hx of lung cancer resulting in esophageal compression  - s/p radiation therapy   - resume fentanyl patch and PRN pain management   - wean supplemental oxygen as tolerated         Anemia, chronic disease    - Hgb between high 7 to low 8 throughout admission. 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 See gastric outlet obstruction for other info          Severe malnutrition    - cachetic appearing  - reports recent 25-30 weight loss  - continue TPN for now- starting liquid diet, may ultimately utilize G tube + PO and stop TPN on discharge if he can maintain nutrition.   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, history diagnosed in September 2017- completed radiation, was on keytruda, PET showed progression on that and is currently off of chemo until he can "get stronger" per most recent notes          Dysphagia              S/P CABG x 5              CAD (coronary artery disease)    S/P CABG x 5  - home nebivolol for hx of CAD/CABG as well as history of SVT in the past currently held- as unable to take PO meds and no meds per G tube at this time either due to aspiration-> if tolerates oral meds now or G tube feeds can consider resuming in the next few days  - maintain on telemetry             VTE Risk Mitigation (From admission, onward)        Ordered     Place sequential compression device  Until discontinued      08/24/18 0452     Place BRUCE hose  Until discontinued      08/24/18 0452        Dispo: advance diet if tolerated, plan above if he does not tolerate diet or has high residuals as to next step per GI. IV zosyn until august 30th. Can consider switching some of IV meds back to PO forms if oral is tolerated in next day or so.  HH/HH PT recommended as well as rolling walker, wheelchair at discharge. Hopeful for discharge later this week.      Milagros Dhillon MD  Department of Hospital Medicine   [facility] [place]

## 2018-08-27 NOTE — ASSESSMENT & PLAN NOTE
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 Candida in sputum not usually treated- on diflucan at OSH on chart check it appears for oropharyngeal thrush at that time   -cont zosyn for 14 day course- end date august 30th  -appears per pulm note at OSH patient was started on IV steroids for pneumonia/high oxygen requirements over there. Cannot convert to PO steroids now due to inability to take P meds/G tube meds- 8/26: change to 20 mg IV methylpred down from 40 mg to slowly titrate off.  - repeat cultures NG  - duo nebs BID with albuterol nebs PRN SOB  - utilizes 2-3L NC at [facility]--> titrating down oxygen now- down to 3-4 L now  - wean supplemental oxygen as tolerated to maintain saturations greater than 92%  - aspiration precautions  - maintain HOB > 30  - 8/27 start clear liquid diet per GI recs. WBC increased from 21-->29 today without bands, no signs of worsening aspiration or new infection. Will continue to trend.

## 2018-08-27 NOTE — ASSESSMENT & PLAN NOTE
-documented at OSH as reason for IV fluconazole upon transfer (as patient cannot take oral or G tube medications now).  -continue for 14 day course- end date august 30th. Can consider changing to oral fluconazole if patient tolerates oral intake now.

## 2018-08-27 NOTE — ADDENDUM NOTE
Addendum  created 08/27/18 1556 by Nicole A. Lombardi, CRNA    Intraprocedure Flowsheets edited

## 2018-08-27 NOTE — TREATMENT PLAN
AES Follow-up Note    S/P EGD today.     Impression:  - Pre-existing esophageal stent in good position.  - A large amount of food (residue) in the stomach.  - Gastric stenosis was found at the pylorus. Dilated up to 20mm balloon. Likely contributing his symptoms as causing partial gastric outlet obstruction.    Plan  - Use Reglan (metoclopramide) 10 mg PO TID today.  - Start clear liquid diet today. Follow symptoms and check residuals. If no high residuals (can be checked through exisiting PEG), may advanced as tolerated to no more than a mechanical soft diet.  - May consider exchange to esophageal stent to a larger diameter, partially covered  model depending on how he tolerates a mechanical soft diet.  - If there large residuals with liquid meals, may consider trial of low volume liquid nutrition (continuous feedings via PEG vs oral low volume.high frequency intake) vs. placement of jejunal tube extension.    AES will continue to follow.      Francine Fierro MD  Gastroenterology & Hepatology Fellow   Pager: [phone]

## 2018-08-27 NOTE — ANESTHESIA POSTPROCEDURE EVALUATION
"Anesthesia Post Evaluation    Patient: Ismael Montiel    Procedure(s) Performed: Procedure(s) (LRB):  ESOPHAGOGASTRODUODENOSCOPY (EGD) (N/A)    Final Anesthesia Type: general  Patient location during evaluation: PACU  Patient participation: Yes- Able to Participate  Level of consciousness: awake and alert  Post-procedure vital signs: reviewed and stable  Pain management: adequate  Airway patency: patent  PONV status at discharge: No PONV  Anesthetic complications: no      Cardiovascular status: blood pressure returned to baseline  Respiratory status: unassisted  Hydration status: euvolemic  Follow-up not needed.        Visit Vitals  BP (!) 111/54 (BP Location: Left arm, Patient Position: Lying)   Pulse 82   Temp 36.7 °C (98 °F) (Temporal)   Resp 19   Ht 5' 6" (1.676 m)   Wt 48.6 kg (107 lb 2.3 oz)   SpO2 98%   BMI 17.29 kg/m²       Pain/Lainey Score: Pain Assessment Performed: Yes (8/27/2018 11:22 AM)  Presence of Pain: complains of pain/discomfort (8/27/2018 11:22 AM)  Pain Rating Prior to Med Admin: 6 (8/27/2018 11:34 AM)  Pain Rating Post Med Admin: 4 (8/27/2018  9:57 AM)  Lainey Score: 9 (8/27/2018 11:22 AM)        "

## 2018-08-27 NOTE — ASSESSMENT & PLAN NOTE
- CT at [facility] concerning for impaired gastric emptying and constipation  - GI consulted- EGD showing pyloric stenosis from extrinsic compression- balloon dilitation performed with GI  -clear liquid diet now. reglan 10 mg TID PO.  -if tolerates liquid f diet per GI can advance to mechanical soft diet next. Check residuals per G tube. If large residuals then can consider continuous tube feeds as next option vs lower volume oral feeding with higher frequency oral intake vs  Placement of J tube extension. Other consideration may be to exchange esophageal stent to a larger diameter if he does not tolerate liquid diet

## 2018-08-27 NOTE — SUBJECTIVE & OBJECTIVE
Past Medical History:   Diagnosis Date    CAD (coronary artery disease) 2014    Cancer     esophagus    Esophageal stenosis     Hyperlipidemia     Hypertension     S/P CABG x 5 2005       Past Surgical History:   Procedure Laterality Date    CARDIAC SURGERY  2005    5 vessel     CORONARY ARTERY BYPASS GRAFT      X 5    CORONARY STENT PLACEMENT      X 1    GASTROSTOMY TUBE PLACEMENT  10/09/2017    HEEL SPUR SURGERY Left 2005     Interval History: did well with EGD. Patient is excited to try clear liquids today. He denies SOB, worsening sputum today. Denies fevers overnight. Wife at bedside- both are aware of GI's plan if he tolerates/does not tolerate liquid diet today. Pain controlled with IV and oral meds.               Review of patient's allergies indicates:   Allergen Reactions    Pneumococcal 23-fiona ps vaccine        No current facility-administered medications on file prior to encounter.      Current Outpatient Medications on File Prior to Encounter   Medication Sig    acetaminophen (TYLENOL) 325 MG tablet Take 2 tablets (650 mg total) by mouth every 8 (eight) hours as needed for Temperature greater than (or equal to 101 degree F).    albuterol 90 mcg/actuation inhaler Inhale 2 puffs into the lungs every 4 (four) hours as needed for Wheezing or Shortness of Breath. Rescue    albuterol-ipratropium (DUO-NEB) 2.5 mg-0.5 mg/3 mL nebulizer solution Take 3 mLs by nebulization every 6 (six) hours while awake. Rescue    enoxaparin (LOVENOX) 40 mg/0.4 mL Syrg Inject 0.4 mLs (40 mg total) into the skin once daily.    fentaNYL (DURAGESIC) 25 mcg/hr Place 1 patch onto the skin every 48 hours.    methylPREDNISolone sodium succinate (SOLU-MEDROL) 40 mg/mL SolR Inject 40 mg into the vein once daily.    metoclopramide HCl (REGLAN) 5 MG tablet Take 1 tablet (5 mg total) by mouth 3 (three) times daily.    metoclopramide HCl (REGLAN) 5 mg/mL injection Inject 2 mLs (10 mg total) into the vein every 6 (six)  hours.    nebivolol (BYSTOLIC) 5 MG Tab Take 5 mg by mouth once daily.    ondansetron (ZOFRAN) 8 MG tablet Take 1 tablet (8 mg total) by mouth every 8 (eight) hours as needed for Nausea.    oxyCODONE (ROXICODONE) 5 MG immediate release tablet 1 or 2 tabs per peg Q 3 HOURS PRN pain    piperacillin sodium/tazobactam (PIPERACILLIN-TAZOBACTAM 4.5G/100ML D5W IVPB, READY TO MIX,) Inject 100 mLs (4.5 g total) into the vein every 8 (eight) hours.    prochlorperazine (COMPAZINE) 10 MG tablet Take 1 tablet (10 mg total) by mouth every 6 (six) hours as needed (nausea and vomitting.  Rotate with Zofran if needed.).    sodium chloride 3.5% 3.5 % nebulizer solution Take 4 mLs by nebulization every 12 (twelve) hours. for 10 days    tobramycin (NEBCIN) 40 mg/mL injection Take 7.5 mLs (300 mg total) by nebulization every 12 (twelve) hours.     Family History     Problem Relation (Age of Onset)    Aneurysm Brother    Cancer Father, Sister    Heart disease Mother    No Known Problems Daughter    Rheum arthritis Mother        Tobacco Use    Smoking status: Former Smoker     Packs/day: 0.25     Years: 50.00     Pack years: 12.50     Types: Cigarettes     Start date: 12/4/1964    Smokeless tobacco: Never Used    Tobacco comment: smoking cessation handout given and reviewed   Substance and Sexual Activity    Alcohol use: No     Comment:  none in 8 months    Drug use: No    Sexual activity: Yes     Partners: Female     Review of Systems   Constitutional: Positive for fatigue and unexpected weight change. Negative for chills and fever.   HENT: Positive for trouble swallowing. Negative for congestion.    Eyes: Negative for discharge and visual disturbance.   Respiratory: Negative for cough, chest tightness and shortness of breath.         Oxygen dependent    Cardiovascular: Positive for chest pain. Negative for palpitations and leg swelling.   Gastrointestinal: Positive for constipation, nausea and vomiting. Negative for  abdominal distention and abdominal pain.   Genitourinary: Negative for dysuria and frequency.   Musculoskeletal: Positive for myalgias. Negative for joint swelling.   Skin: Negative for color change and rash.   Neurological: Negative for seizures and syncope.     Objective:     Vital Signs (Most Recent):  Temp: 98.5 °F (36.9 °C) (08/27/18 1539)  Pulse: 75 (08/27/18 1539)  Resp: 18 (08/27/18 1539)  BP: 115/64 (08/27/18 1539)  SpO2: (!) 92 % (08/27/18 1539) Vital Signs (24h Range):  Temp:  [96.5 °F (35.8 °C)-98.5 °F (36.9 °C)] 98.5 °F (36.9 °C)  Pulse:  [67-84] 75  Resp:  [15-20] 18  SpO2:  [91 %-98 %] 92 %  BP: ()/(46-81) 115/64     Weight: 48.6 kg (107 lb 2.3 oz)  Body mass index is 17.29 kg/m².    Physical Exam   Constitutional: He is oriented to person, place, and time. He appears well-developed. He appears cachectic. He is cooperative. He has a sickly appearance. Nasal cannula in place.   HENT:   Head: Normocephalic and atraumatic.   RIJ TLC   Eyes: EOM are normal. Pupils are equal, round, and reactive to light.   Neck: Normal range of motion. Neck supple.   Cardiovascular: Normal rate, regular rhythm, normal heart sounds and intact distal pulses.   No murmur heard.  Pulmonary/Chest: Effort normal. No respiratory distress. He has rales in the right middle field and the right lower field. He exhibits tenderness.   Abdominal: Soft. Bowel sounds are normal. He exhibits no distension. There is no tenderness.   PEG to LUQ    Musculoskeletal: Normal range of motion. He exhibits no edema.   Neurological: He is alert and oriented to person, place, and time.   Skin: Skin is warm and dry.   Fentanyl patch to L anterior chest wall    Psychiatric: He has a normal mood and affect. His behavior is normal.   Nursing note and vitals reviewed.        CRANIAL NERVES     CN III, IV, VI   Pupils are equal, round, and reactive to light.  Extraocular motions are normal.        Significant Labs:   CBC:   Recent Labs   Lab   08/26/18 0407  08/27/18 0442   WBC  21.59*  29.84*   HGB  8.1*  8.1*   HCT  25.7*  25.9*   PLT  610*  634*     CMP:   Recent Labs   Lab  08/26/18 0407 08/27/18 0442   NA  132*  132*   K  4.1  4.1   CL  101  101   CO2  27  26   GLU  173*  171*   BUN  22  23   CREATININE  0.6  0.7   CALCIUM  9.1  9.1   PROT  5.4*  5.7*   ALBUMIN  1.9*  2.0*   BILITOT  0.3  0.4   ALKPHOS  96  100   AST  41*  22   ALT  51*  52*   ANIONGAP  4*  5*   EGFRNONAA  >60.0  >60.0       Significant Imaging: I have reviewed all pertinent imaging results/findings within the past 24 hours.

## 2018-08-28 PROBLEM — K59.00 CONSTIPATION: Status: RESOLVED | Noted: 2018-08-24 | Resolved: 2018-08-28

## 2018-08-28 PROBLEM — R05.9 COUGH: Status: RESOLVED | Noted: 2018-08-17 | Resolved: 2018-08-28

## 2018-08-28 PROBLEM — R78.81 BACTEREMIA DUE TO GRAM-POSITIVE BACTERIA: Status: RESOLVED | Noted: 2018-04-11 | Resolved: 2018-08-28

## 2018-08-28 PROBLEM — R13.19 ESOPHAGEAL DYSPHAGIA: Status: ACTIVE | Noted: 2017-09-05

## 2018-08-28 PROBLEM — A48.0: Status: RESOLVED | Noted: 2018-04-13 | Resolved: 2018-08-28

## 2018-08-28 PROBLEM — R04.2 HEMOPTYSIS: Status: RESOLVED | Noted: 2018-07-27 | Resolved: 2018-08-28

## 2018-08-28 PROBLEM — R10.9 ABDOMINAL PAIN: Status: RESOLVED | Noted: 2018-04-11 | Resolved: 2018-08-28

## 2018-08-28 PROBLEM — D72.829 LEUKOCYTOSIS: Status: RESOLVED | Noted: 2018-04-11 | Resolved: 2018-08-28

## 2018-08-28 PROBLEM — K57.32 DIVERTICULITIS OF LARGE INTESTINE WITHOUT PERFORATION OR ABSCESS WITHOUT BLEEDING: Status: RESOLVED | Noted: 2017-12-12 | Resolved: 2018-08-28

## 2018-08-28 LAB
ABO + RH BLD: NORMAL
ALBUMIN SERPL BCP-MCNC: 2 G/DL
ALP SERPL-CCNC: 100 U/L
ALT SERPL W/O P-5'-P-CCNC: 44 U/L
ANION GAP SERPL CALC-SCNC: 5 MMOL/L
AST SERPL-CCNC: 16 U/L
BACTERIA SPEC AEROBE CULT: NORMAL
BASOPHILS # BLD AUTO: 0.1 K/UL
BASOPHILS NFR BLD: 0.4 %
BILIRUB SERPL-MCNC: 0.3 MG/DL
BLD GP AB SCN CELLS X3 SERPL QL: NORMAL
BUN SERPL-MCNC: 22 MG/DL
CALCIUM SERPL-MCNC: 8.8 MG/DL
CHLORIDE SERPL-SCNC: 104 MMOL/L
CO2 SERPL-SCNC: 24 MMOL/L
CREAT SERPL-MCNC: 0.6 MG/DL
DIFFERENTIAL METHOD: ABNORMAL
EOSINOPHIL # BLD AUTO: 0.1 K/UL
EOSINOPHIL NFR BLD: 0.3 %
ERYTHROCYTE [DISTWIDTH] IN BLOOD BY AUTOMATED COUNT: 17.3 %
EST. GFR  (AFRICAN AMERICAN): >60 ML/MIN/1.73 M^2
EST. GFR  (NON AFRICAN AMERICAN): >60 ML/MIN/1.73 M^2
GLUCOSE SERPL-MCNC: 153 MG/DL
GRAM STN SPEC: NORMAL
HCT VFR BLD AUTO: 26.7 %
HGB BLD-MCNC: 8.1 G/DL
IMM GRANULOCYTES # BLD AUTO: 0.91 K/UL
IMM GRANULOCYTES NFR BLD AUTO: 3.8 %
LYMPHOCYTES # BLD AUTO: 1.2 K/UL
LYMPHOCYTES NFR BLD: 5 %
MAGNESIUM SERPL-MCNC: 1.9 MG/DL
MCH RBC QN AUTO: 25.7 PG
MCHC RBC AUTO-ENTMCNC: 30.3 G/DL
MCV RBC AUTO: 85 FL
MONOCYTES # BLD AUTO: 1.9 K/UL
MONOCYTES NFR BLD: 7.8 %
NEUTROPHILS # BLD AUTO: 19.9 K/UL
NEUTROPHILS NFR BLD: 82.7 %
NRBC BLD-RTO: 0 /100 WBC
PHOSPHATE SERPL-MCNC: 2 MG/DL
PLATELET # BLD AUTO: 601 K/UL
PMV BLD AUTO: 9.4 FL
POCT GLUCOSE: 150 MG/DL (ref 70–110)
POCT GLUCOSE: 170 MG/DL (ref 70–110)
POCT GLUCOSE: 179 MG/DL (ref 70–110)
POCT GLUCOSE: 182 MG/DL (ref 70–110)
POCT GLUCOSE: 217 MG/DL (ref 70–110)
POCT GLUCOSE: 94 MG/DL (ref 70–110)
POTASSIUM SERPL-SCNC: 3.9 MMOL/L
PROT SERPL-MCNC: 5.6 G/DL
RBC # BLD AUTO: 3.15 M/UL
SODIUM SERPL-SCNC: 133 MMOL/L
WBC # BLD AUTO: 24 K/UL

## 2018-08-28 PROCEDURE — 11000001 HC ACUTE MED/SURG PRIVATE ROOM

## 2018-08-28 PROCEDURE — 25000003 PHARM REV CODE 250: Performed by: HOSPITALIST

## 2018-08-28 PROCEDURE — 36415 COLL VENOUS BLD VENIPUNCTURE: CPT

## 2018-08-28 PROCEDURE — 84100 ASSAY OF PHOSPHORUS: CPT

## 2018-08-28 PROCEDURE — 27000221 HC OXYGEN, UP TO 24 HOURS

## 2018-08-28 PROCEDURE — 63600175 PHARM REV CODE 636 W HCPCS: Performed by: HOSPITALIST

## 2018-08-28 PROCEDURE — 94761 N-INVAS EAR/PLS OXIMETRY MLT: CPT

## 2018-08-28 PROCEDURE — 94640 AIRWAY INHALATION TREATMENT: CPT

## 2018-08-28 PROCEDURE — 85025 COMPLETE CBC W/AUTO DIFF WBC: CPT

## 2018-08-28 PROCEDURE — 97530 THERAPEUTIC ACTIVITIES: CPT

## 2018-08-28 PROCEDURE — 25000242 PHARM REV CODE 250 ALT 637 W/ HCPCS: Performed by: HOSPITALIST

## 2018-08-28 PROCEDURE — 86920 COMPATIBILITY TEST SPIN: CPT

## 2018-08-28 PROCEDURE — 86901 BLOOD TYPING SEROLOGIC RH(D): CPT

## 2018-08-28 PROCEDURE — 83735 ASSAY OF MAGNESIUM: CPT

## 2018-08-28 PROCEDURE — 63600175 PHARM REV CODE 636 W HCPCS

## 2018-08-28 PROCEDURE — C9113 INJ PANTOPRAZOLE SODIUM, VIA: HCPCS | Performed by: HOSPITALIST

## 2018-08-28 PROCEDURE — 99232 SBSQ HOSP IP/OBS MODERATE 35: CPT | Mod: ,,, | Performed by: INTERNAL MEDICINE

## 2018-08-28 PROCEDURE — 97116 GAIT TRAINING THERAPY: CPT

## 2018-08-28 PROCEDURE — 80053 COMPREHEN METABOLIC PANEL: CPT

## 2018-08-28 RX ORDER — HYDROMORPHONE HYDROCHLORIDE 1 MG/ML
0.25 INJECTION, SOLUTION INTRAMUSCULAR; INTRAVENOUS; SUBCUTANEOUS ONCE AS NEEDED
Status: COMPLETED | OUTPATIENT
Start: 2018-08-29 | End: 2018-08-28

## 2018-08-28 RX ORDER — NEBIVOLOL 5 MG/1
5 TABLET ORAL DAILY
Status: DISCONTINUED | OUTPATIENT
Start: 2018-08-29 | End: 2018-08-31 | Stop reason: HOSPADM

## 2018-08-28 RX ORDER — PREDNISONE 20 MG/1
20 TABLET ORAL DAILY
Status: DISCONTINUED | OUTPATIENT
Start: 2018-08-29 | End: 2018-08-30

## 2018-08-28 RX ORDER — PANTOPRAZOLE SODIUM 40 MG/1
40 FOR SUSPENSION ORAL DAILY
Status: DISCONTINUED | OUTPATIENT
Start: 2018-08-29 | End: 2018-08-31 | Stop reason: HOSPADM

## 2018-08-28 RX ADMIN — IPRATROPIUM BROMIDE AND ALBUTEROL SULFATE 3 ML: .5; 3 SOLUTION RESPIRATORY (INHALATION) at 04:08

## 2018-08-28 RX ADMIN — METHYLPREDNISOLONE SODIUM SUCCINATE 20 MG: 40 INJECTION, POWDER, FOR SOLUTION INTRAMUSCULAR; INTRAVENOUS at 08:08

## 2018-08-28 RX ADMIN — FLUCONAZOLE 100 MG: 2 INJECTION, SOLUTION INTRAVENOUS at 11:08

## 2018-08-28 RX ADMIN — PIPERACILLIN AND TAZOBACTAM 4.5 G: 4; .5 INJECTION, POWDER, LYOPHILIZED, FOR SOLUTION INTRAVENOUS; PARENTERAL at 03:08

## 2018-08-28 RX ADMIN — HYDROMORPHONE HYDROCHLORIDE 1 MG: 1 INJECTION, SOLUTION INTRAMUSCULAR; INTRAVENOUS; SUBCUTANEOUS at 08:08

## 2018-08-28 RX ADMIN — HYDROMORPHONE HYDROCHLORIDE 1 MG: 1 INJECTION, SOLUTION INTRAMUSCULAR; INTRAVENOUS; SUBCUTANEOUS at 06:08

## 2018-08-28 RX ADMIN — HYDROMORPHONE HYDROCHLORIDE 1 MG: 1 INJECTION, SOLUTION INTRAMUSCULAR; INTRAVENOUS; SUBCUTANEOUS at 04:08

## 2018-08-28 RX ADMIN — PIPERACILLIN AND TAZOBACTAM 4.5 G: 4; .5 INJECTION, POWDER, LYOPHILIZED, FOR SOLUTION INTRAVENOUS; PARENTERAL at 08:08

## 2018-08-28 RX ADMIN — IPRATROPIUM BROMIDE AND ALBUTEROL SULFATE 3 ML: .5; 3 SOLUTION RESPIRATORY (INHALATION) at 07:08

## 2018-08-28 RX ADMIN — DEXTROSE 40 MG: 50 INJECTION, SOLUTION INTRAVENOUS at 08:08

## 2018-08-28 RX ADMIN — INSULIN ASPART 4 UNITS: 100 INJECTION, SOLUTION INTRAVENOUS; SUBCUTANEOUS at 11:08

## 2018-08-28 RX ADMIN — IPRATROPIUM BROMIDE AND ALBUTEROL SULFATE 3 ML: .5; 3 SOLUTION RESPIRATORY (INHALATION) at 08:08

## 2018-08-28 RX ADMIN — METOCLOPRAMIDE 10 MG: 10 TABLET ORAL at 11:08

## 2018-08-28 RX ADMIN — METOCLOPRAMIDE 10 MG: 10 TABLET ORAL at 04:08

## 2018-08-28 RX ADMIN — METOCLOPRAMIDE 10 MG: 10 TABLET ORAL at 05:08

## 2018-08-28 RX ADMIN — HYDROMORPHONE HYDROCHLORIDE 0.25 MG: 1 INJECTION, SOLUTION INTRAMUSCULAR; INTRAVENOUS; SUBCUTANEOUS at 11:08

## 2018-08-28 RX ADMIN — INSULIN ASPART 2 UNITS: 100 INJECTION, SOLUTION INTRAVENOUS; SUBCUTANEOUS at 04:08

## 2018-08-28 RX ADMIN — IPRATROPIUM BROMIDE AND ALBUTEROL SULFATE 3 ML: .5; 3 SOLUTION RESPIRATORY (INHALATION) at 12:08

## 2018-08-28 RX ADMIN — HYDROMORPHONE HYDROCHLORIDE 1 MG: 1 INJECTION, SOLUTION INTRAMUSCULAR; INTRAVENOUS; SUBCUTANEOUS at 11:08

## 2018-08-28 RX ADMIN — PIPERACILLIN AND TAZOBACTAM 4.5 G: 4; .5 INJECTION, POWDER, LYOPHILIZED, FOR SOLUTION INTRAVENOUS; PARENTERAL at 01:08

## 2018-08-28 RX ADMIN — INSULIN ASPART 1 UNITS: 100 INJECTION, SOLUTION INTRAVENOUS; SUBCUTANEOUS at 11:08

## 2018-08-28 RX ADMIN — FENTANYL 1 PATCH: 25 PATCH, EXTENDED RELEASE TRANSDERMAL at 05:08

## 2018-08-28 RX ADMIN — HYDROMORPHONE HYDROCHLORIDE 1 MG: 1 INJECTION, SOLUTION INTRAMUSCULAR; INTRAVENOUS; SUBCUTANEOUS at 02:08

## 2018-08-28 RX ADMIN — METOCLOPRAMIDE 10 MG: 10 TABLET ORAL at 06:08

## 2018-08-28 NOTE — PLAN OF CARE
Report received, care assumed. Patient consuming small amts of clear liquid and tolerating them well. Less then 5 ml residual noted at midnight.In better spirits this evening with pain much better controlled w increase in narcotics. Remains on high flow O2/ TPN/ Lipids and antibiotics. See flow sheet for full assessment. POC reviewed, will continue to monitor.

## 2018-08-28 NOTE — PLAN OF CARE
Problem: Physical Therapy Goal  Goal: Physical Therapy Goal  Goals to be met by: 9/7/2018    Patient will increase functional independence with mobility by performing:    Supine <> sit with Modified [place].  Sit <> stand transfer with Modified [place] using No Assistive Device and Rolling Walker.  Bed <> chair transfer via Stand Pivot with Modified [place] using No Assistive Device and Rolling Walker.  Gait  x 150 feet with Contact Guard Assistance using Rolling Walker to prepare for community ambulation and endurance activities.  Dynamic standing for 8 minutes with Contact Guard Assistance using No Assistive Device to prepare for functional tasks in standing.  Able to tolerate exercise for 15-20 reps with independence.     Outcome: Ongoing (interventions implemented as appropriate)  Goals remain appropriate.

## 2018-08-28 NOTE — PT/OT/SLP PROGRESS
"Physical Therapy Treatment    Patient Name:  Ismael Montiel   MRN:  [id number]    Recommendations:     Discharge Recommendations:  home health PT   Discharge Equipment Recommendations: walker, rolling, wheelchair   Barriers to discharge: None    Assessment:     Ismael Montiel is a 67 y.o. male admitted with a medical diagnosis of Aspiration pneumonia.  He presents with the following impairments/functional limitations:  weakness, impaired endurance, gait instability, impaired cardiopulmonary response to activity. Pt tolerated session well with focus on gait training and transfers. Pt progressing well with increased ambulation distance and decreased assistance needed for standing balance. Pt remains limited by impaired endurance and weakness. Pt with noticeable R dorsiflexion weakness limiting ankle ROM and gait mechanics. Pt will continue to benefit from therapy services to improve impairments listed above.     Rehab Prognosis:  Good ; patient would benefit from acute skilled PT services to address these deficits and reach maximum level of function.      Recent Surgery: Procedure(s) (LRB):  ESOPHAGOGASTRODUODENOSCOPY (EGD) (N/A) 1 Day Post-Op    Plan:     During this hospitalization, patient to be seen 3 x/week to address the above listed problems via gait training, therapeutic activities, therapeutic exercises, neuromuscular re-education  · Plan of Care Expires:  09/24/18   Plan of Care Reviewed with: patient    Subjective     Communicated with NSG prior to session.  Patient found supine, HOB elevated upon PTA entry to room, agreeable to treatment.      Chief Complaint: no c/o   Patient comments/goals: "I'm feeling a good bit better. I'd rather not be in that bed if you want me in the chair."  Pain/Comfort:  · Pain Rating 1: 0/10  · Pain Rating Post-Intervention 1: 0/10    Patients cultural, spiritual, Confucianist conflicts given the current situation: none stated    Objective:     Patient found with: PICC " line, oxygen, telemetry     General Precautions: Standard, aspiration, fall   Orthopedic Precautions:N/A   Braces: N/A     Functional Mobility:  · Bed Mobility:     · Scooting: supervision  · Supine to Sit: supervision  · Sit to Supine: supervision  · Transfers:     · Sit to Stand:  stand by assistance with rolling walker  · Bed to Chair: contact guard assistance with  rolling walker  using  Step Transfer  · Gait: Pt ambulates 142 ft with RW and SBA with few brief periods of CGA. Pt with decreased [place], slight FFP, and limited R foot clearance associated wth R dosiflexion weakness. Pt limited by fatigue.  · Balance: Pt sits EOB without assistance unsupported. Pt stands with SBA for static stand with BUE support on RW.       AM-PAC 6 CLICK MOBILITY  Turning over in bed (including adjusting bedclothes, sheets and blankets)?: 4  Sitting down on and standing up from a chair with arms (e.g., wheelchair, bedside commode, etc.): 3  Moving from lying on back to sitting on the side of the bed?: 4  Moving to and from a bed to a chair (including a wheelchair)?: 3  Need to walk in hospital room?: 3  Climbing 3-5 steps with a railing?: 2  Basic Mobility Total Score: 19       Therapeutic Activities and Exercises:   Pt assisted with functional mobility as noted above.   Pt stands midway through gait trial at hallway window for standing rest break for ~ 2 minutes. Pt maintains static balance well with SBA for safety.   Pt educated on importance of increased time OOB/UIC during day and assistance required from [facility] staff to transfer to chair.   Pt educated on PT POC and seated therex to perform independently. R foot dorsiflexion noted at this time.     Patient left up in chair with all lines intact, call button in reach and wife present.    GOALS:   Multidisciplinary Problems     Physical Therapy Goals        Problem: Physical Therapy Goal    Goal Priority Disciplines Outcome Goal Variances Interventions   Physical Therapy Goal      PT, PT/OT Ongoing (interventions implemented as appropriate)     Description:  Goals to be met by: 9/7/2018    Patient will increase functional independence with mobility by performing:    Supine <> sit with Modified [place].  Sit <> stand transfer with Modified [place] using No Assistive Device and Rolling Walker.  Bed <> chair transfer via Stand Pivot with Modified [place] using No Assistive Device and Rolling Walker.  Gait  x 150 feet with Contact Guard Assistance using Rolling Walker to prepare for community ambulation and endurance activities.  Dynamic standing for 8 minutes with Contact Guard Assistance using No Assistive Device to prepare for functional tasks in standing.  Able to tolerate exercise for 15-20 reps with independence.                      Time Tracking:     PT Received On: 08/28/18  PT Start Time: 1515     PT Stop Time: 1538  PT Total Time (min): 23 min     Billable Minutes: Gait Training 14 and Therapeutic Activity 9    Treatment Type: Treatment  PT/PTA: PTA     PTA Visit Number: 2     Hayden Webster, VIRGILIO  08/28/2018

## 2018-08-28 NOTE — PROGRESS NOTES
" [facility]  Adult Nutrition  Progress Note    SUMMARY       Recommendations    Recommendation/Intervention:   1. Advance diet as tolerated to [place], Low Fiber diet.   2. Continue TPN until pt tolerating solid foods and/or TF initiated and tolerated.   3. Once able, initiate TF - Nutren 2.0 at 40mL/hr.    -Will provide 1920kcal, 81g protein, and 664mL fluid. Will monitor.   Goals: Pt to receive >85% EEN and EPN via TPN and/or TF  Nutrition Goal Status: goal met  Communication of RD Recs: reviewed with RN    Reason for Assessment    Reason for Assessment: RD follow-up  Diagnosis: other (see comments)(aspiration pneumonia)  Relevant Medical History: lung cancer, HTN, HLD, CAD  General Information Comments: Pt started on clear liquids. Consumed broth and some juice this AM. Reports that he did well with clears last night as well. Residuals were checked and none noted. Pt still getting TPN, running at 70mL/hr. Noted pt had partial outlet obstruction which was cause of pts GI issues. Wife reports pt used Osmolite, Boost VHC, and Ensure at home pta.   Nutrition Discharge Planning: TF +/- po intake    Nutrition/Diet History    Do you have any cultural, spiritual, Uatsdin conflicts, given your current situation?: None stated  Factors Affecting Nutritional Intake: altered gastrointestinal function, clear liquid diet  Nutrtion Support Frequency Prior to Admit: 2 cans Boost VHC and 1 Ensure Clear via PEG + po intake    Anthropometrics    Temp: 98.4 °F (36.9 °C)  Height: 5' 6" (167.6 cm)  Height (inches): 66 in  Weight Method: Bed Scale  Weight: 48.6 kg (107 lb 2.3 oz)  Weight (lb): 107.14 lb  Ideal Body Weight (IBW), Male: 142 lb  % Ideal Body Weight, Male (lb): 75.45   BMI (Calculated): 17.3  BMI Grade: 17 - 18.4 protein-energy malnutrition grade I  Weight Loss: unintentional  Usual Body Weight (UBW), k.2 kg  Weight Change Amount: 52 lb  % Usual Body Weight: 67.45  % Weight Change From Usual Weight: " -32.69 %     Lab/Procedures/Meds    Pertinent Labs Reviewed: reviewed  Pertinent Labs Comments: Na 132, Glu 171, P 2.1  Pertinent Medications Reviewed: reviewed  Pertinent Medications Comments: fentanyl, methylprednisolone    Physical Findings/Assessment    Overall Physical Appearance: loss of muscle mass, loss of subcutaneous fat, on oxygen therapy, weak, generalized wasting  Tubes: gastrostomy tube  Skin: intact    Estimated/Assessed Needs    Weight Used For Calorie Calculations: 48.6 kg (107 lb 2.3 oz)  Energy Calorie Requirements (kcal): 8009-7203  Energy Need Method: Kcal/kg(30-35kcal/kg)  Protein Requirements: 63-73g(1.3-1.5g/kg)  Weight Used For Protein Calculations: 48.6 kg (107 lb 2.3 oz)  Fluid Requirements (mL): 1mL/kcal or per MD  RDA Method (mL): 1458     Nutrition Prescription Ordered    Current Diet Order: Clear Liquids  Current Nutrition Support Formula Ordered: Clinimix E 5/20  Current Nutrition Support Rate Ordered: 70 (ml)  Current Nutrition Support Frequency Ordered: mL/hr  Oral Nutrition Supplement: with lipids    Evaluation of Received Nutrient/Fluid Intake    Parenteral Calories (kcal): 1478  Parenteral Protein (gm): 84  Parenteral Fluid (mL): 1680  Lipid Calories (kcals): 500 kcals  GIR (Glucose Infusion Rate) (mg/kg/min): 4.8 mg/kg/min  Total Calories (kcal): 1978  % Kcal Needs: >100%  % Protein Needs: >100%  I/O: +1.8L  Energy Calories Required: exceed needs  Protein Required: exceed needs  Fluid Required: other (see comments)(per MD)  Comments: LBM 8/24  Tolerance: tolerating  % Intake of Estimated Energy Needs: 75 - 100 %  % Meal Intake: 25 - 50 % clear liquids    Nutrition Risk    Level of Risk/Frequency of Follow-up: (f/u 2x/week)     Assessment and Plan    Severe protein-calorie malnutrition    Malnutrition in the context of Chronic Illness/Injury    Related to (etiology):  Decreased intake, lung cancer    Signs and Symptoms (as evidenced by):  Energy Intake: <50% of estimated energy  requirement for 10 months  Body Fat Depletion: severe depletion of orbitals, triceps and thoracic and lumbar region   Muscle Mass Depletion: severe depletion of clavicle region, scapular region, interosseous muscle and lower extremities , moderate depletion of temples  Weight Loss: 33% x 1 year     Nutrition Diagnosis Status:  Continues            Monitor and Evaluation    Food and Nutrient Intake: energy intake, food and beverage intake, enteral nutrition intake, parenteral nutrition intake  Food and Nutrient Adminstration: diet order, enteral and parenteral nutrition administration  Anthropometric Measurements: weight, weight change, body mass index  Biochemical Data, Medical Tests and Procedures: electrolyte and renal panel, gastrointestinal profile, glucose/endocrine profile, inflammatory profile, lipid profile  Nutrition-Focused Physical Findings: overall appearance     Nutrition Follow-Up    RD Follow-up?: Yes

## 2018-08-28 NOTE — SUBJECTIVE & OBJECTIVE
Subjective:     Interval History:   Patient seen and examined.   Reports tolerating clears.   No/minimal residuals overnight.   Remains on tPN    Review of Systems   Constitutional: Negative for fever.   HENT: Positive for trouble swallowing.    Gastrointestinal: Negative for abdominal distention, abdominal pain, anal bleeding and blood in stool.     Objective:     Vital Signs (Most Recent):  Temp: 98.4 °F (36.9 °C) (08/28/18 1135)  Pulse: 94 (08/28/18 1251)  Resp: 20 (08/28/18 1251)  BP: 122/61 (08/28/18 1135)  SpO2: (!) 94 % (08/28/18 1251) Vital Signs (24h Range):  Temp:  [97.6 °F (36.4 °C)-98.5 °F (36.9 °C)] 98.4 °F (36.9 °C)  Pulse:  [71-94] 94  Resp:  [16-20] 20  SpO2:  [89 %-96 %] 94 %  BP: (115-137)/(61-69) 122/61     Weight: 48.6 kg (107 lb 2.3 oz) (08/24/18 1300)  Body mass index is 17.29 kg/m².      Intake/Output Summary (Last 24 hours) at 8/28/2018 1508  Last data filed at 8/28/2018 0500  Gross per 24 hour   Intake 3794.26 ml   Output 1600 ml   Net 2194.26 ml       Lines/Drains/Airways     Central Venous Catheter Line                 Percutaneous Central Line Insertion/Assessment - triple lumen  08/20/18 1425 right internal jugular 8 days          Drain                 Gastrostomy/Enterostomy Percutaneous endoscopic gastrostomy (PEG) LUQ feeding -- days          Pressure Ulcer                 Pressure Injury 08/24/18 1252 medial Coccyx Stage 1 4 days                Physical Exam   Constitutional: He is oriented to person, place, and time. He appears ill.   Eyes: No scleral icterus.   Pulmonary/Chest: Effort normal. No respiratory distress.   Abdominal: Soft. Bowel sounds are normal. He exhibits no distension and no mass. There is no tenderness. There is no rebound and no guarding. No hernia.   PEG tube in place   Musculoskeletal: He exhibits no edema.   Neurological: He is alert and oriented to person, place, and time.     /61 (BP Location: Left arm, Patient Position: Lying)   Pulse 94   Temp  "98.4 °F (36.9 °C) (Oral)   Resp 20   Ht 5' 6" (1.676 m)   Wt 48.6 kg (107 lb 2.3 oz)   SpO2 (!) 94%   BMI 17.29 kg/m²   Lab Results   Component Value Date    WBC 24.00 (H) 08/28/2018    HGB 8.1 (L) 08/28/2018    HCT 26.7 (L) 08/28/2018    MCV 85 08/28/2018     (H) 08/28/2018     Lab Results   Component Value Date    INR 1.0 08/25/2018     Lab Results   Component Value Date     (L) 08/28/2018    K 3.9 08/28/2018    CREATININE 0.6 08/28/2018     Lab Results   Component Value Date    ALBUMIN 2.0 (L) 08/28/2018    ALT 44 08/28/2018    AST 16 08/28/2018    ALKPHOS 100 08/28/2018    BILITOT 0.3 08/28/2018     No results found for: AFP  Lab Results   Component Value Date    LIPASE <10 (L) 04/10/2018     No results found for: TACROLIMUS    Imaging:  Reviewed and as noted in HPI.       "

## 2018-08-28 NOTE — ASSESSMENT & PLAN NOTE
Malnutrition in the context of Chronic Illness/Injury    Related to (etiology):  Decreased intake, lung cancer    Signs and Symptoms (as evidenced by):  Energy Intake: <50% of estimated energy requirement for 10 months  Body Fat Depletion: severe depletion of orbitals, triceps and thoracic and lumbar region   Muscle Mass Depletion: severe depletion of clavicle region, scapular region, interosseous muscle and lower extremities , moderate depletion of temples  Weight Loss: 33% x 1 year     Nutrition Diagnosis Status:  Continues

## 2018-08-28 NOTE — PLAN OF CARE
08/28/18 1354   Discharge Reassessment   Assessment Type Discharge Planning Reassessment   Discharge plan remains the same: Yes   Provided patient/caregiver education on the expected discharge date and the discharge plan Yes   Do you have any problems affording any of your prescribed medications? TBD   Discharge Plan A Home Health;Home with family   Discharge Plan B Home with family   Change in patient condition or support system No   Patient choice form signed by patient/caregiver N/A   Explained to the the patient/caregiver why the discharge planned changed: Yes   Involved the patient/caregiver in establishing a new discharge plan: Yes   Can the patient answer the patient profile reliably? Yes, cognitively intact   How does the patient rate their overall health at the present time? Fair   Describe the patient's ability to walk at the present time. Walks with the help of equipment   How often would a person be available to care for the patient? Often   Number of comorbid conditions (as recorded on the chart) Five or more   During the past month, has the patient often been bothered by feeling down, depressed or hopeless? No   During the past month, has the patient often been bothered by little interest or pleasure in doing things? No

## 2018-08-28 NOTE — PROGRESS NOTES
[facility]  Gastroenterology  Progress Note    Patient Name: Ismael Montiel  MRN: [id number]  Admission Date: 8/24/2018  Hospital Length of Stay: 4 days  Code Status: DNR   Attending Provider: Gely Looney MD  Consulting Provider: Francine Fierro MD  Primary Care Physician: Deejay Heck Jr, MD  Principal Problem: Aspiration pneumonia      Subjective:     Interval History:   Patient seen and examined.   Reports tolerating clears.   No/minimal residuals overnight.   Remains on tPN    Review of Systems   Constitutional: Negative for fever.   HENT: Positive for trouble swallowing.    Gastrointestinal: Negative for abdominal distention, abdominal pain, anal bleeding and blood in stool.     Objective:     Vital Signs (Most Recent):  Temp: 98.4 °F (36.9 °C) (08/28/18 1135)  Pulse: 94 (08/28/18 1251)  Resp: 20 (08/28/18 1251)  BP: 122/61 (08/28/18 1135)  SpO2: (!) 94 % (08/28/18 1251) Vital Signs (24h Range):  Temp:  [97.6 °F (36.4 °C)-98.5 °F (36.9 °C)] 98.4 °F (36.9 °C)  Pulse:  [71-94] 94  Resp:  [16-20] 20  SpO2:  [89 %-96 %] 94 %  BP: (115-137)/(61-69) 122/61     Weight: 48.6 kg (107 lb 2.3 oz) (08/24/18 1300)  Body mass index is 17.29 kg/m².      Intake/Output Summary (Last 24 hours) at 8/28/2018 1508  Last data filed at 8/28/2018 0500  Gross per 24 hour   Intake 3794.26 ml   Output 1600 ml   Net 2194.26 ml       Lines/Drains/Airways     Central Venous Catheter Line                 Percutaneous Central Line Insertion/Assessment - triple lumen  08/20/18 1425 right internal jugular 8 days          Drain                 Gastrostomy/Enterostomy Percutaneous endoscopic gastrostomy (PEG) LUQ feeding -- days          Pressure Ulcer                 Pressure Injury 08/24/18 1252 medial Coccyx Stage 1 4 days                Physical Exam   Constitutional: He is oriented to person, place, and time. He appears ill.   Eyes: No scleral icterus.   Pulmonary/Chest: Effort normal. No respiratory  "distress.   Abdominal: Soft. Bowel sounds are normal. He exhibits no distension and no mass. There is no tenderness. There is no rebound and no guarding. No hernia.   PEG tube in place   Musculoskeletal: He exhibits no edema.   Neurological: He is alert and oriented to person, place, and time.     /61 (BP Location: Left arm, Patient Position: Lying)   Pulse 94   Temp 98.4 °F (36.9 °C) (Oral)   Resp 20   Ht 5' 6" (1.676 m)   Wt 48.6 kg (107 lb 2.3 oz)   SpO2 (!) 94%   BMI 17.29 kg/m²    Lab Results   Component Value Date    WBC 24.00 (H) 08/28/2018    HGB 8.1 (L) 08/28/2018    HCT 26.7 (L) 08/28/2018    MCV 85 08/28/2018     (H) 08/28/2018     Lab Results   Component Value Date    INR 1.0 08/25/2018     Lab Results   Component Value Date     (L) 08/28/2018    K 3.9 08/28/2018    CREATININE 0.6 08/28/2018     Lab Results   Component Value Date    ALBUMIN 2.0 (L) 08/28/2018    ALT 44 08/28/2018    AST 16 08/28/2018    ALKPHOS 100 08/28/2018    BILITOT 0.3 08/28/2018     No results found for: AFP  Lab Results   Component Value Date    LIPASE <10 (L) 04/10/2018     No results found for: TACROLIMUS    Imaging:  Reviewed and as noted in HPI.         Assessment/Plan:     Esophageal dysphagia    Improving towards goal since EGD with dilation of pyloric stenosis.   Recommend slowly advancing the diet to mechanical soft diet if tolerates.  If PO intake is not enough for nutrition, can resume tube feeds and increase as tolerates.   Dilation w/EGD PRN  Wean TPN as needed  Recommend avoiding high fiber foods such as raw vegetables etc.  Appreciate nutrition recs  Please notify AES if any questions/updates.            Thank you for your consult. I will follow-up with patient. Please contact us if you have any additional questions.    Francine Fierro MD  Gastroenterology  [facility]  "

## 2018-08-28 NOTE — ASSESSMENT & PLAN NOTE
Improving towards goal since EGD with dilation of pyloric stenosis.   Recommend slowly advancing the diet to mechanical soft diet if tolerates.  If PO intake is not enough for nutrition, can resume tube feeds and increase as tolerates.   Dilation w/EGD PRN  Recommend avoiding high fiber foods such as raw vegetables etc.  Appreciate nutrition recs  Please notify AES if any questions/updates.

## 2018-08-29 LAB
ALBUMIN SERPL BCP-MCNC: 1.9 G/DL
ALP SERPL-CCNC: 103 U/L
ALT SERPL W/O P-5'-P-CCNC: 36 U/L
ANION GAP SERPL CALC-SCNC: 5 MMOL/L
ANISOCYTOSIS BLD QL SMEAR: SLIGHT
AST SERPL-CCNC: 17 U/L
BACTERIA BLD CULT: NORMAL
BACTERIA BLD CULT: NORMAL
BASOPHILS # BLD AUTO: 0.04 K/UL
BASOPHILS NFR BLD: 0.2 %
BILIRUB SERPL-MCNC: 0.5 MG/DL
BUN SERPL-MCNC: 21 MG/DL
CALCIUM SERPL-MCNC: 8.8 MG/DL
CHLORIDE SERPL-SCNC: 103 MMOL/L
CO2 SERPL-SCNC: 26 MMOL/L
CREAT SERPL-MCNC: 0.6 MG/DL
DIFFERENTIAL METHOD: ABNORMAL
EOSINOPHIL # BLD AUTO: 0 K/UL
EOSINOPHIL NFR BLD: 0.2 %
ERYTHROCYTE [DISTWIDTH] IN BLOOD BY AUTOMATED COUNT: 17.6 %
EST. GFR  (AFRICAN AMERICAN): >60 ML/MIN/1.73 M^2
EST. GFR  (NON AFRICAN AMERICAN): >60 ML/MIN/1.73 M^2
GLUCOSE SERPL-MCNC: 60 MG/DL
HCT VFR BLD AUTO: 25.1 %
HGB BLD-MCNC: 7.8 G/DL
HYPOCHROMIA BLD QL SMEAR: ABNORMAL
IMM GRANULOCYTES # BLD AUTO: 0.79 K/UL
IMM GRANULOCYTES NFR BLD AUTO: 3.4 %
LYMPHOCYTES # BLD AUTO: 1.1 K/UL
LYMPHOCYTES NFR BLD: 4.6 %
MCH RBC QN AUTO: 25.7 PG
MCHC RBC AUTO-ENTMCNC: 31.1 G/DL
MCV RBC AUTO: 83 FL
MONOCYTES # BLD AUTO: 2.2 K/UL
MONOCYTES NFR BLD: 9.2 %
NEUTROPHILS # BLD AUTO: 19.3 K/UL
NEUTROPHILS NFR BLD: 82.4 %
NRBC BLD-RTO: 0 /100 WBC
OVALOCYTES BLD QL SMEAR: ABNORMAL
PLATELET # BLD AUTO: 556 K/UL
PMV BLD AUTO: 9.5 FL
POCT GLUCOSE: 125 MG/DL (ref 70–110)
POCT GLUCOSE: 58 MG/DL (ref 70–110)
POCT GLUCOSE: 62 MG/DL (ref 70–110)
POCT GLUCOSE: 85 MG/DL (ref 70–110)
POIKILOCYTOSIS BLD QL SMEAR: SLIGHT
POLYCHROMASIA BLD QL SMEAR: ABNORMAL
POTASSIUM SERPL-SCNC: 4.2 MMOL/L
PROT SERPL-MCNC: 5.3 G/DL
RBC # BLD AUTO: 3.03 M/UL
SODIUM SERPL-SCNC: 134 MMOL/L
WBC # BLD AUTO: 23.41 K/UL

## 2018-08-29 PROCEDURE — 94664 DEMO&/EVAL PT USE INHALER: CPT

## 2018-08-29 PROCEDURE — 27000646 HC AEROBIKA DEVICE

## 2018-08-29 PROCEDURE — 80053 COMPREHEN METABOLIC PANEL: CPT

## 2018-08-29 PROCEDURE — 25000242 PHARM REV CODE 250 ALT 637 W/ HCPCS: Performed by: HOSPITALIST

## 2018-08-29 PROCEDURE — 63600175 PHARM REV CODE 636 W HCPCS: Performed by: PHYSICIAN ASSISTANT

## 2018-08-29 PROCEDURE — 11000001 HC ACUTE MED/SURG PRIVATE ROOM

## 2018-08-29 PROCEDURE — 25000003 PHARM REV CODE 250: Performed by: HOSPITALIST

## 2018-08-29 PROCEDURE — 99232 SBSQ HOSP IP/OBS MODERATE 35: CPT | Mod: ,,, | Performed by: INTERNAL MEDICINE

## 2018-08-29 PROCEDURE — 63600175 PHARM REV CODE 636 W HCPCS: Performed by: HOSPITALIST

## 2018-08-29 PROCEDURE — 25000003 PHARM REV CODE 250: Performed by: INTERNAL MEDICINE

## 2018-08-29 PROCEDURE — 99900035 HC TECH TIME PER 15 MIN (STAT)

## 2018-08-29 PROCEDURE — 63600175 PHARM REV CODE 636 W HCPCS: Performed by: INTERNAL MEDICINE

## 2018-08-29 PROCEDURE — 94761 N-INVAS EAR/PLS OXIMETRY MLT: CPT

## 2018-08-29 PROCEDURE — 85025 COMPLETE CBC W/AUTO DIFF WBC: CPT

## 2018-08-29 PROCEDURE — 94640 AIRWAY INHALATION TREATMENT: CPT

## 2018-08-29 PROCEDURE — 27000221 HC OXYGEN, UP TO 24 HOURS

## 2018-08-29 RX ORDER — HYDROMORPHONE HYDROCHLORIDE 1 MG/ML
1 INJECTION, SOLUTION INTRAMUSCULAR; INTRAVENOUS; SUBCUTANEOUS EVERY 4 HOURS PRN
Status: DISCONTINUED | OUTPATIENT
Start: 2018-08-29 | End: 2018-08-31 | Stop reason: HOSPADM

## 2018-08-29 RX ORDER — DEXTROSE 50 % IN WATER (D50W) INTRAVENOUS SYRINGE
Status: DISPENSED
Start: 2018-08-29 | End: 2018-08-29

## 2018-08-29 RX ORDER — HYDROCODONE BITARTRATE AND ACETAMINOPHEN 7.5; 325 MG/1; MG/1
1 TABLET ORAL EVERY 4 HOURS PRN
Status: DISCONTINUED | OUTPATIENT
Start: 2018-08-29 | End: 2018-08-31 | Stop reason: HOSPADM

## 2018-08-29 RX ORDER — FENTANYL 25 UG/1
1 PATCH TRANSDERMAL
Status: DISCONTINUED | OUTPATIENT
Start: 2018-08-31 | End: 2018-08-30

## 2018-08-29 RX ORDER — HYDROCODONE BITARTRATE AND ACETAMINOPHEN 5; 325 MG/1; MG/1
1 TABLET ORAL EVERY 4 HOURS PRN
Status: DISCONTINUED | OUTPATIENT
Start: 2018-08-29 | End: 2018-08-31 | Stop reason: HOSPADM

## 2018-08-29 RX ADMIN — METOCLOPRAMIDE 10 MG: 10 TABLET ORAL at 06:08

## 2018-08-29 RX ADMIN — DEXTROSE MONOHYDRATE 12.5 G: 25 INJECTION, SOLUTION INTRAVENOUS at 04:08

## 2018-08-29 RX ADMIN — IPRATROPIUM BROMIDE AND ALBUTEROL SULFATE 3 ML: .5; 3 SOLUTION RESPIRATORY (INHALATION) at 12:08

## 2018-08-29 RX ADMIN — HYDROMORPHONE HYDROCHLORIDE 1 MG: 1 INJECTION, SOLUTION INTRAMUSCULAR; INTRAVENOUS; SUBCUTANEOUS at 01:08

## 2018-08-29 RX ADMIN — PREDNISONE 20 MG: 20 TABLET ORAL at 09:08

## 2018-08-29 RX ADMIN — FLUCONAZOLE 100 MG: 2 INJECTION, SOLUTION INTRAVENOUS at 11:08

## 2018-08-29 RX ADMIN — PIPERACILLIN AND TAZOBACTAM 4.5 G: 4; .5 INJECTION, POWDER, LYOPHILIZED, FOR SOLUTION INTRAVENOUS; PARENTERAL at 08:08

## 2018-08-29 RX ADMIN — IPRATROPIUM BROMIDE AND ALBUTEROL SULFATE 3 ML: .5; 3 SOLUTION RESPIRATORY (INHALATION) at 07:08

## 2018-08-29 RX ADMIN — HYDROMORPHONE HYDROCHLORIDE 1 MG: 1 INJECTION, SOLUTION INTRAMUSCULAR; INTRAVENOUS; SUBCUTANEOUS at 02:08

## 2018-08-29 RX ADMIN — PANTOPRAZOLE SODIUM 40 MG: 40 GRANULE, DELAYED RELEASE ORAL at 09:08

## 2018-08-29 RX ADMIN — HYDROMORPHONE HYDROCHLORIDE 1 MG: 1 INJECTION, SOLUTION INTRAMUSCULAR; INTRAVENOUS; SUBCUTANEOUS at 08:08

## 2018-08-29 RX ADMIN — HYDROMORPHONE HYDROCHLORIDE 1 MG: 1 INJECTION, SOLUTION INTRAMUSCULAR; INTRAVENOUS; SUBCUTANEOUS at 05:08

## 2018-08-29 RX ADMIN — NEBIVOLOL HYDROCHLORIDE 5 MG: 5 TABLET ORAL at 09:08

## 2018-08-29 RX ADMIN — HYDROMORPHONE HYDROCHLORIDE 1 MG: 1 INJECTION, SOLUTION INTRAMUSCULAR; INTRAVENOUS; SUBCUTANEOUS at 09:08

## 2018-08-29 RX ADMIN — PIPERACILLIN AND TAZOBACTAM 4.5 G: 4; .5 INJECTION, POWDER, LYOPHILIZED, FOR SOLUTION INTRAVENOUS; PARENTERAL at 11:08

## 2018-08-29 RX ADMIN — METOCLOPRAMIDE 10 MG: 10 TABLET ORAL at 11:08

## 2018-08-29 RX ADMIN — HYDROCODONE BITARTRATE AND ACETAMINOPHEN 1 TABLET: 7.5; 325 TABLET ORAL at 06:08

## 2018-08-29 RX ADMIN — PIPERACILLIN AND TAZOBACTAM 4.5 G: 4; .5 INJECTION, POWDER, LYOPHILIZED, FOR SOLUTION INTRAVENOUS; PARENTERAL at 04:08

## 2018-08-29 NOTE — ASSESSMENT & PLAN NOTE
S/P CABG x 5  -Controlled. Patient with history of CAD/CABG as well as history of SVT. Now that patient able to take oral meds will restart Nebivolol 5 mg po daily to treat on 8/29 (patient on at home but held since admit due to unable to use G tube and patient not tolerating oral meds or diet).

## 2018-08-29 NOTE — ASSESSMENT & PLAN NOTE
S/P CABG x 5  -Controlled. Patient with history of CAD/CABG as well as history of SVT. Now that patient able to take oral meds will restart Nebivolol 5 mg po daily to treat today, 8/29 (patient on at home but held since admit due to unable to use G tube and patient not tolerating oral meds or diet).

## 2018-08-29 NOTE — ASSESSMENT & PLAN NOTE
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  -Patient to follow-up with Heme/Onc as outpatient on discharge.     " Cold/Sinus

## 2018-08-29 NOTE — ASSESSMENT & PLAN NOTE
-CT scan of abdomen at OSH concerning for impaired gastric emptying and constipation.  -AES consulted on admit and EGD done that showed pyloric stenosis from extrinsic compression- balloon dilitation performed with GI on 8/27.   -Clear liquid diet now as per GI on 8/28 and plan to advance to soft mechanical diet on 8/29 and monitor to see how patient tolerates. Continue Reglan 10 mg TID PO to help with gastric emptying.   -Check residuals per G tube. If large residuals then can consider continuous tube feeds as next option vs. lower volume oral feeding with higher frequency oral intake vs  Placement of J tube extension.

## 2018-08-29 NOTE — PROGRESS NOTES
[facility] Hospital Medicine  Progress Note    Patient Name: Ismael Montiel  MRN: [id number]  Patient Class: IP- Inpatient   Admission Date: 8/24/2018  Length of Stay: 4 days  Attending Physician: Gely Looney MD  Primary Care Provider: Deejay Heck Jr, MD    Hospital Medicine Team: [facility] [place] Gely Looney MD    Subjective:     Principal Problem:Aspiration pneumonia    HPI:  68 y/o gentleman, who was transferred from [facility] for GI evaluation.  He has a PMH of lung cancer resulting in esophageal compression s/p stenting, chronic hypoxic respiratory failure requiring supplemental oxygen at 2-3L NC, HTN, and CAD.  He states that symptoms started around the time the stent was placed on 07/20/18.  On 08/17/18 he presented to [facility] and was found to have patchy bilateral infiltrates on CXR.  He was diagnosed with aspiration pneumonia and started empirically on ABX. Respiratory cultures were positive for pseudomonas and candida. He denies fever, chills, or diarrhea. His O2 requirements have increased and he is currently on 8L high flow NC.  He endorses stable SOB, chest / epigastric discomfort, nausea, feeling of abdominal fullness, and constipation.  He has been on TPN d/t aspiration concerns.  He reports that his PEG tube was replaced 08/23/18 d/t faulty balloon.  He underwent an esophagram and CT abd earlier today which suggested the esophageal stent was patent. CT was concerning for impaired gastric emptying and constipation.    Hospital Course:  8/24: GI consulted- plan for EGD, possible G to J tube conversion on Monday. Patient comfortable on exam. No distress. Plans discussed  8/25: titrated from 10L NC to 8L NC. No issues overnight. Pain well controlled with dilaudid. PT/OT consulted for recommendations  8:26: down to 4-5L NC today. Feeling improved overall. Titrated IV steroids down today. Plan for 14 day course of fluconazole for thrush hx, zosyn  for pneumonia total.  8/27: EGD showing large amount of food residue in stomach with gastric stenosis at pylorus from extrinsic severe stenosis/compression causing the partial outlet obstruction with balloon dilitation. Stent in place. GI recs for clear liquid diet, check G tube residuals, start reglan 10 mg TID PO.   8/28: Patient so far tolerating clear liquid diet. AES okay with advancing to soft mechanical diet but recommend to avoid foods that are high in fiber. Plan to advance to mechanical soft diet on 8/29. Will allow TPN to end tonight and not reorder. If patient able to tolerate enough oral diet may not need to supplement with TF via his G tube but will need to see how patient tolerates oral food intake. Oxygenation improved and patient down to 3.5 liters of oxygen.     Interval History: Patient so far reporting he is tolerating clear liquid diet with no nausea or vomiting. Patient still on TPN but plan to stop TPN this evening and advance diet in am to see how tolerates. Patient with PEG in place and consider starting TF to supplement nutrition if cannot take in enough oral diet.     Review of Systems   Constitutional: Negative for chills and fever.   Respiratory: Negative for cough and shortness of breath.    Cardiovascular: Negative for chest pain, palpitations and leg swelling.   Gastrointestinal: Negative for abdominal pain, diarrhea, nausea and vomiting.   Musculoskeletal: Positive for back pain. Negative for neck pain.   Skin: Negative for rash.   Neurological: Negative for dizziness and light-headedness.   Psychiatric/Behavioral: Negative for confusion and hallucinations.     Objective:     Vital Signs (Most Recent):  Temp: 98.9 °F (37.2 °C) (08/28/18 1510)  Pulse: 80 (08/28/18 1800)  Resp: 20 (08/28/18 1510)  BP: 122/67 (08/28/18 1510)  SpO2: (!) 92 % (08/28/18 1510) Vital Signs (24h Range):  Temp:  [97.6 °F (36.4 °C)-98.9 °F (37.2 °C)] 98.9 °F (37.2 °C)  Pulse:  [71-96] 80  Resp:  [16-20]  20  SpO2:  [89 %-96 %] 92 %  BP: (118-137)/(61-69) 122/67     Weight: 48.6 kg (107 lb 2.3 oz)  Body mass index is 17.29 kg/m².    Intake/Output Summary (Last 24 hours) at 8/28/2018 1906  Last data filed at 8/28/2018 1845  Gross per 24 hour   Intake 4769.26 ml   Output 1950 ml   Net 2819.26 ml      Physical Exam   Constitutional: He is oriented to person, place, and time. He appears cachectic. He is cooperative. Nasal cannula in place.   HENT:   RIJ TLC   Eyes: No scleral icterus.   Neck: Neck supple.   Cardiovascular: Normal rate, regular rhythm, normal heart sounds and intact distal pulses.   No murmur heard.  Pulmonary/Chest: Effort normal. No respiratory distress. He has no wheezes. He has no rales.   Abdominal: Soft. Bowel sounds are normal. He exhibits no distension. There is no tenderness. There is no rebound and no guarding.   PEG to LUQ    Musculoskeletal: He exhibits no edema.   Neurological: He is alert and oriented to person, place, and time.   Skin: Skin is warm and dry. Capillary refill takes less than 2 seconds. No rash noted.   Fentanyl patch to L anterior chest wall    Psychiatric: He has a normal mood and affect. His behavior is normal. Thought content normal.   Nursing note and vitals reviewed.      Significant Labs:   CBC:   Recent Labs   Lab  08/27/18   0442  08/28/18   0615   WBC  29.84*  24.00*   HGB  8.1*  8.1*   HCT  25.9*  26.7*   PLT  634*  601*     CMP:   Recent Labs   Lab  08/27/18   0442  08/28/18   0615   NA  132*  133*   K  4.1  3.9   CL  101  104   CO2  26  24   GLU  171*  153*   BUN  23  22   CREATININE  0.7  0.6   CALCIUM  9.1  8.8   PROT  5.7*  5.6*   ALBUMIN  2.0*  2.0*   BILITOT  0.4  0.3   ALKPHOS  100  100   AST  22  16   ALT  52*  44   ANIONGAP  5*  5*   EGFRNONAA  >60.0  >60.0     POCT Glucose:   Recent Labs   Lab  08/28/18   0829  08/28/18   1130  08/28/18   1646   POCTGLUCOSE  170*  217*  179*     Significant Imaging: I have reviewed all pertinent imaging results/findings  "within the past 24 hours.    Assessment/Plan:      * Aspiration pneumonia    Pseudomonas respiratory infection  - Improving.   - Recent diagnosis with aspiration pneumonia while at [facility]- will be a chronic issue due to tracheo-esophagela fistula even with stent in place, cannot prevent this from being a recurrent issue and patient aware.   - Respiratory cultures positive for Pseudomonas and Candida. Candida in sputum not usually treated- on diflucan at OSH on chart check it appears for oropharyngeal thrush at that time and end date for Diflucan is 8/30. Patient on IV Zosyn for 14 day course to treat pseudomonas lung infection with end date 8/30.   -Per pulm note at OSH patient was started on IV steroids for pneumonia/high oxygen requirements at [facility]. Patient on methylprednisolone 40 mg IV daily and now that on po will switch to Prednisone 20 mg po daily on 8/28 and wean to off.    -Continue Duo nebs BID with albuterol nebs PRN SOB.  -Continue aspiration precautions.  -Maintain HOB > 30.        Acute on chronic respiratory failure with hypoxia    - Patient utilizes 2-3L NC at baseline at home--> titrating down oxygen now- down to 3.5 L on 8/28 so improving and almost back to baseline oxygen requirements.   - Wean supplemental oxygen as tolerated to maintain saturations greater than 92%.          Chronic pain due to neoplasm    Controlled. Continue Fentanyl patch 25 mcg transdermal every 72 hours to treat.           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  -Patient to follow-up with Heme/Onc as outpatient on " discharge.           Anemia, chronic disease    -Controlled. Hgb between high 7 to low 8 throughout admission. Patient asymptomatic.   -Monitor with daily CBC in hospital and transfuse if < 7.         Coronary artery disease involving native coronary artery of native heart without angina pectoris    S/P CABG x 5  -Controlled. Patient with history of CAD/CABG as well as history of SVT. Now that patient able to take oral meds will restart Nebivolol 5 mg po daily to treat on 8/29 (patient on at home but held since admit due to unable to use G tube and patient not tolerating oral meds or diet).             Esophageal dysphagia    -Present on admit. Patient with recent esophageal stent placement. Patient started on oral diet with clear liquids on 8/28 and tolerating so plan to advance to mechanical soft diet as per GI recs on 8/29.  -Monitor for tolerance to oral diet. Patient with G tube in place that can be used to give meds and supplement diet if patient cannot take in much po or tolerate pills.           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  - Transferred from [facility] for GI evaluation / possible AES intervention  - Reports ongoing issues with reflux / regurgitation. Patient stared on Reglan 10 mg po TID to help with food tolerance.   -EGD on 8/27 showing esophageal stent in place and patent.           Gastric outlet obstruction    -CT scan of abdomen at OSH concerning for impaired gastric emptying and constipation.  -AES consulted on admit and EGD done that showed pyloric stenosis from extrinsic compression- balloon dilitation performed with GI on 8/27.   -Clear liquid diet now as per GI on 8/28 and plan to advance to soft mechanical diet on 8/29 and monitor to see how patient tolerates. Continue Reglan 10 mg TID PO to help with gastric emptying.   -Check residuals per G tube. If large residuals then can consider  continuous tube feeds as next option vs. lower volume oral feeding with higher frequency oral intake vs  Placement of J tube extension.             Pyloric stenosis    -Dilitation on EGD 8/27. Likely contributor to gastric outlet obstruction.          Oral thrush    -Documented at OSH and started on treatment with Diflucan. Plan continue for 14 day course of Diflucan with end date of 8/30. Improving.           Severe protein-calorie malnutrition    -Cachetic appearing. Prealbumin 16 on 8/24. Body mass index is 17.29 kg/m².  -Reports recent 25-30 weight loss.  Patient on TPN but also started clear liquid diet. Patient so far tolerating clear liquid diet so will stop TPN. Plan to advance to soft mechanical diet on 8/29.   -Nutrition following. Calorie count. Patient with PEG in place and consider adding nutritional supplement via PEG pending oral intake.           DNR (do not resuscitate)    Patient DNR as per patient request.           Lung cancer metastatic to bone    - hx of lung cancer resulting in esophageal compression  - s/p radiation therapy   - resume fentanyl patch and PRN pain management   - wean supplemental oxygen as tolerated              VTE Risk Mitigation (From admission, onward)        Ordered     Place sequential compression device  Until discontinued      08/24/18 0452     Place BRUCE hose  Until discontinued      08/24/18 0452              Gely Looney MD  Department of Hospital Medicine   [facility]

## 2018-08-29 NOTE — ASSESSMENT & PLAN NOTE
-Cachetic appearing. Prealbumin 16 on 8/24. Body mass index is 17.29 kg/m².  -Reports recent 25-30 weight loss.  Patient on TPN but also started clear liquid diet. Patient so far tolerating clear liquid diet so will stop TPN. Plan to advance to soft mechanical diet on 8/29.   -Nutrition following. Calorie count. Patient with PEG in place and consider adding nutritional supplement via PEG pending oral intake.

## 2018-08-29 NOTE — ASSESSMENT & PLAN NOTE
-Documented at OSH and started on treatment with Diflucan. Plan continue for 14 day course of Diflucan with end date of 8/30. Improving.

## 2018-08-29 NOTE — ASSESSMENT & PLAN NOTE
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  -Patient to follow-up with Heme/Onc as outpatient on discharge.     "

## 2018-08-29 NOTE — TREATMENT PLAN
AES Follow-up Note    Patient was seen and examined. Labs reviewed.   Progressing towards goal.  Ongoing calorie count.   Please call if any questions/concerns.     Francine Fierro MD  Gastroenterology & Hepatology Fellow   Pager: [phone]

## 2018-08-29 NOTE — ASSESSMENT & PLAN NOTE
Pseudomonas respiratory infection  - Improving. WBC starting to decrease as steroids weaned so likely cause of persistent leukocytosis on labs is steroids not worsening lung infection.   - Recent diagnosis with aspiration pneumonia while at [facility]- will be a chronic issue due to tracheo-esophagela fistula even with stent in place, cannot prevent this from being a recurrent issue and patient aware.   - Respiratory cultures positive for Pseudomonas and Candida. Candida in sputum not usually treated- on diflucan at OSH on chart check it appears for oropharyngeal thrush at that time and end date for Diflucan is 8/30. Patient on IV Zosyn for 14 day course to treat pseudomonas lung infection with end date 8/30.   -Per pulm note at OSH patient was started on IV steroids for pneumonia/high oxygen requirements at [facility]. Patient on methylprednisolone 40 mg IV daily and now that on po will switch to Prednisone 20 mg po daily on 8/28 and wean to off.    -Continue Duo nebs BID with albuterol nebs PRN SOB.  -Continue aspiration precautions.  -Maintain HOB > 30.

## 2018-08-29 NOTE — ASSESSMENT & PLAN NOTE
· Not controlled. Continue Fentanyl patch 25 mcg transdermal every 72 hours to treat.   · Add Norco orally prn for breakthrough pain control.

## 2018-08-29 NOTE — SUBJECTIVE & OBJECTIVE
Interval History: Patient tolerating soft diet so far today. Calorie count in progress. Patient reporting 6/10 pain to back area.     Review of Systems   Constitutional: Negative for chills and fever.   Respiratory: Negative for cough and shortness of breath.    Cardiovascular: Negative for chest pain, palpitations and leg swelling.   Gastrointestinal: Negative for abdominal pain, diarrhea, nausea and vomiting.   Musculoskeletal: Positive for back pain. Negative for neck pain.   Skin: Negative for rash.   Neurological: Negative for dizziness and light-headedness.   Psychiatric/Behavioral: Negative for confusion and hallucinations.     Objective:     Vital Signs (Most Recent):  Temp: 97.9 °F (36.6 °C) (08/29/18 1217)  Pulse: 76 (08/29/18 1500)  Resp: 18 (08/29/18 1217)  BP: (!) 116/55 (08/29/18 1429)  SpO2: 95 % (08/29/18 1217) Vital Signs (24h Range):  Temp:  [97.4 °F (36.3 °C)-98.7 °F (37.1 °C)] 97.9 °F (36.6 °C)  Pulse:  [] 76  Resp:  [16-24] 18  SpO2:  [92 %-98 %] 95 %  BP: ()/(49-70) 116/55     Weight: 48.6 kg (107 lb 2.3 oz)  Body mass index is 17.29 kg/m².    Intake/Output Summary (Last 24 hours) at 8/29/2018 1608  Last data filed at 8/29/2018 0408  Gross per 24 hour   Intake 1125 ml   Output 1850 ml   Net -725 ml      Physical Exam   Constitutional: He is oriented to person, place, and time. He appears cachectic. He is cooperative. Nasal cannula in place.   HENT:   RIJ TLC   Eyes: No scleral icterus.   Neck: Neck supple.   Cardiovascular: Normal rate, regular rhythm, normal heart sounds and intact distal pulses.   No murmur heard.  Pulmonary/Chest: Effort normal. No respiratory distress. He has no wheezes. He has no rales.   Abdominal: Soft. Bowel sounds are normal. He exhibits no distension. There is no tenderness. There is no rebound and no guarding.   PEG to LUQ    Musculoskeletal: He exhibits no edema.   Neurological: He is alert and oriented to person, place, and time.   Skin: Skin is warm  and dry. Capillary refill takes less than 2 seconds. No rash noted.   Fentanyl patch to L anterior chest wall    Psychiatric: He has a normal mood and affect. His behavior is normal. Thought content normal.   Nursing note and vitals reviewed.      Significant Labs:   CBC:   Recent Labs   Lab  08/28/18   0615  08/29/18   0406   WBC  24.00*  23.41*   HGB  8.1*  7.8*   HCT  26.7*  25.1*   PLT  601*  556*     CMP:   Recent Labs   Lab  08/28/18   0615  08/29/18   0406   NA  133*  134*   K  3.9  4.2   CL  104  103   CO2  24  26   GLU  153*  60*   BUN  22  21   CREATININE  0.6  0.6   CALCIUM  8.8  8.8   PROT  5.6*  5.3*   ALBUMIN  2.0*  1.9*   BILITOT  0.3  0.5   ALKPHOS  100  103   AST  16  17   ALT  44  36   ANIONGAP  5*  5*   EGFRNONAA  >60.0  >60.0       Significant Imaging: I have reviewed all pertinent imaging results/findings within the past 24 hours.

## 2018-08-29 NOTE — ASSESSMENT & PLAN NOTE
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  - Transferred from [facility] for GI evaluation / possible AES intervention  - Reports ongoing issues with reflux / regurgitation. Patient stared on Reglan 10 mg po TID to help with food tolerance.   -EGD on 8/27 showing esophageal stent in place and patent.

## 2018-08-29 NOTE — ASSESSMENT & PLAN NOTE
-Cachetic appearing. Prealbumin 16 on 8/24. Body mass index is 17.29 kg/m².  -Reports recent 25-30 weight loss.  Patient on TPN but also started clear liquid diet. Patient so far tolerating clear liquid diet so will stop TPN. Plan to advance to soft mechanical diet on 8/29.   -Nutrition following. Calorie count in progress. Patient with PEG in place and consider adding nutritional supplement via PEG pending oral intake.

## 2018-08-29 NOTE — ASSESSMENT & PLAN NOTE
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  - Transferred from [facility] for GI evaluation / possible AES intervention  - Reports ongoing issues with reflux / regurgitation. Patient stared on Reglan 10 mg po TID to help with food tolerance.   -EGD on 8/27 showing esophageal stent in place and patent.

## 2018-08-29 NOTE — ASSESSMENT & PLAN NOTE
-Present on admit. Patient with recent esophageal stent placement. Patient started on oral diet with clear liquids on 8/28 and tolerating so plan to advance to mechanical soft diet as per GI recs on 8/29.  -Monitor for tolerance to oral diet. Patient with G tube in place that can be used to give meds and supplement diet if patient cannot take in much po or tolerate pills.

## 2018-08-29 NOTE — PLAN OF CARE
Problem: Patient Care Overview  Goal: Plan of Care Review  Outcome: Ongoing (interventions implemented as appropriate)  Questions answered and concerns addressed.   08/29/18 1715   Coping/Psychosocial   Plan Of Care Reviewed With patient       Problem: Pressure Ulcer Risk (Delroy Scale) (Adult,Obstetrics,Pediatric)  Goal: Skin Integrity  Patient will demonstrate the desired outcomes by discharge/transition of care.  Outcome: Ongoing (interventions implemented as appropriate)  Skin integrity intact on coccyx foam dressing applied.   08/29/18 1715   Pressure Ulcer Risk (Delroy Scale) (Adult,Obstetrics,Pediatric)   Skin Integrity making progress toward outcome       Problem: Pain, Acute (Adult)  Goal: Acceptable Pain Control/Comfort Level  Patient will demonstrate the desired outcomes by discharge/transition of care.  Outcome: Ongoing (interventions implemented as appropriate)  Oral pain medication added to pain regimen; will continue to monitor.

## 2018-08-29 NOTE — ASSESSMENT & PLAN NOTE
-CT scan of abdomen at OSH concerning for impaired gastric emptying and constipation.  -AES consulted on admit and EGD done that showed pyloric stenosis from extrinsic compression- balloon dilitation performed with GI on 8/27.   -Clear liquid diet started as per GI on 8/28 and patient tolerated so plan to advance to soft mechanical diet today, 8/29 and monitor to see how patient tolerates. Continue Reglan 10 mg TID PO to help with gastric emptying.   -Check residuals per G tube. If large residuals then can consider continuous tube feeds as next option vs. lower volume oral feeding with higher frequency oral intake vs  Placement of J tube extension.

## 2018-08-29 NOTE — PROGRESS NOTES
[facility] Hospital Medicine  Progress Note    Patient Name: Ismael Montiel  MRN: [id number]  Patient Class: IP- Inpatient   Admission Date: 8/24/2018  Length of Stay: 5 days  Attending Physician: Gely Looney MD  Primary Care Provider: Deejay Heck Jr, MD    Hospital Medicine Team: [facility] K Gely Looney MD    Subjective:     Principal Problem:Aspiration pneumonia    HPI:  68 y/o gentleman, who was transferred from [facility] for GI evaluation.  He has a PMH of lung cancer resulting in esophageal compression s/p stenting, chronic hypoxic respiratory failure requiring supplemental oxygen at 2-3L NC, HTN, and CAD.  He states that symptoms started around the time the stent was placed on 07/20/18.  On 08/17/18 he presented to [facility] and was found to have patchy bilateral infiltrates on CXR.  He was diagnosed with aspiration pneumonia and started empirically on ABX. Respiratory cultures were positive for pseudomonas and candida. He denies fever, chills, or diarrhea. His O2 requirements have increased and he is currently on 8L high flow NC.  He endorses stable SOB, chest / epigastric discomfort, nausea, feeling of abdominal fullness, and constipation.  He has been on TPN d/t aspiration concerns.  He reports that his PEG tube was replaced 08/23/18 d/t faulty balloon.  He underwent an esophagram and CT abd earlier today which suggested the esophageal stent was patent. CT was concerning for impaired gastric emptying and constipation.    Hospital Course:  8/24: GI consulted- plan for EGD, possible G to J tube conversion on Monday. Patient comfortable on exam. No distress. Plans discussed  8/25: titrated from 10L NC to 8L NC. No issues overnight. Pain well controlled with dilaudid. PT/OT consulted for recommendations  8:26: down to 4-5L NC today. Feeling improved overall. Titrated IV steroids down today. Plan for 14 day course of fluconazole for thrush hx, zosyn  for pneumonia total.  8/27: EGD showing large amount of food residue in stomach with gastric stenosis at pylorus from extrinsic severe stenosis/compression causing the partial outlet obstruction with balloon dilitation. Stent in place. GI recs for clear liquid diet, check G tube residuals, start reglan 10 mg TID PO.   8/28: Patient so far tolerating clear liquid diet. AES okay with advancing to soft mechanical diet but recommend to avoid foods that are high in fiber. Plan to advance to mechanical soft diet on 8/29. Will allow TPN to end tonight and not reorder. If patient able to tolerate enough oral diet may not need to supplement with TF via his G tube but will need to see how patient tolerates oral food intake. Oxygenation improved and patient down to 3.5 liters of oxygen.   8/29: Calorie count in progress. Patient's diet advanced to mechanical soft diet. Monitoring oral intake and so far is tolerating oral diet. Patient off TPN. Patient still having significant back pain related to his metastatic lung cancer.       Interval History: Patient tolerating soft diet so far today. Calorie count in progress. Patient reporting 6/10 pain to back area.     Review of Systems   Constitutional: Negative for chills and fever.   Respiratory: Negative for cough and shortness of breath.    Cardiovascular: Negative for chest pain, palpitations and leg swelling.   Gastrointestinal: Negative for abdominal pain, diarrhea, nausea and vomiting.   Musculoskeletal: Positive for back pain. Negative for neck pain.   Skin: Negative for rash.   Neurological: Negative for dizziness and light-headedness.   Psychiatric/Behavioral: Negative for confusion and hallucinations.     Objective:     Vital Signs (Most Recent):  Temp: 97.9 °F (36.6 °C) (08/29/18 1217)  Pulse: 76 (08/29/18 1500)  Resp: 18 (08/29/18 1217)  BP: (!) 116/55 (08/29/18 1429)  SpO2: 95 % (08/29/18 1217) Vital Signs (24h Range):  Temp:  [97.4 °F (36.3 °C)-98.7 °F (37.1 °C)] 97.9  °F (36.6 °C)  Pulse:  [] 76  Resp:  [16-24] 18  SpO2:  [92 %-98 %] 95 %  BP: ()/(49-70) 116/55     Weight: 48.6 kg (107 lb 2.3 oz)  Body mass index is 17.29 kg/m².    Intake/Output Summary (Last 24 hours) at 8/29/2018 1608  Last data filed at 8/29/2018 0408  Gross per 24 hour   Intake 1125 ml   Output 1850 ml   Net -725 ml      Physical Exam   Constitutional: He is oriented to person, place, and time. He appears cachectic. He is cooperative. Nasal cannula in place.   HENT:   RIJ TLC   Eyes: No scleral icterus.   Neck: Neck supple.   Cardiovascular: Normal rate, regular rhythm, normal heart sounds and intact distal pulses.   No murmur heard.  Pulmonary/Chest: Effort normal. No respiratory distress. He has no wheezes. He has no rales.   Abdominal: Soft. Bowel sounds are normal. He exhibits no distension. There is no tenderness. There is no rebound and no guarding.   PEG to LUQ    Musculoskeletal: He exhibits no edema.   Neurological: He is alert and oriented to person, place, and time.   Skin: Skin is warm and dry. Capillary refill takes less than 2 seconds. No rash noted.   Fentanyl patch to L anterior chest wall    Psychiatric: He has a normal mood and affect. His behavior is normal. Thought content normal.   Nursing note and vitals reviewed.      Significant Labs:   CBC:   Recent Labs   Lab  08/28/18   0615  08/29/18   0406   WBC  24.00*  23.41*   HGB  8.1*  7.8*   HCT  26.7*  25.1*   PLT  601*  556*     CMP:   Recent Labs   Lab  08/28/18   0615  08/29/18   0406   NA  133*  134*   K  3.9  4.2   CL  104  103   CO2  24  26   GLU  153*  60*   BUN  22  21   CREATININE  0.6  0.6   CALCIUM  8.8  8.8   PROT  5.6*  5.3*   ALBUMIN  2.0*  1.9*   BILITOT  0.3  0.5   ALKPHOS  100  103   AST  16  17   ALT  44  36   ANIONGAP  5*  5*   EGFRNONAA  >60.0  >60.0       Significant Imaging: I have reviewed all pertinent imaging results/findings within the past 24 hours.    Assessment/Plan:      * Aspiration pneumonia     Pseudomonas respiratory infection  - Improving. WBC starting to decrease as steroids weaned so likely cause of persistent leukocytosis on labs is steroids not worsening lung infection.   - Recent diagnosis with aspiration pneumonia while at [facility]- will be a chronic issue due to tracheo-esophagela fistula even with stent in place, cannot prevent this from being a recurrent issue and patient aware.   - Respiratory cultures positive for Pseudomonas and Candida. Candida in sputum not usually treated- on diflucan at OSH on chart check it appears for oropharyngeal thrush at that time and end date for Diflucan is 8/30. Patient on IV Zosyn for 14 day course to treat pseudomonas lung infection with end date 8/30.   -Per pulm note at OSH patient was started on IV steroids for pneumonia/high oxygen requirements at [facility]. Patient on methylprednisolone 40 mg IV daily and now that on po will switch to Prednisone 20 mg po daily on 8/28 and wean to off.    -Continue Duo nebs BID with albuterol nebs PRN SOB.  -Continue aspiration precautions.  -Maintain HOB > 30.        Acute on chronic respiratory failure with hypoxia    - Improving. Patient utilizes 2-3L NC at baseline at home--> titrating down oxygen- down to 3.5-4 L on 8/29 so improving from admit when patient was on high flow oxygen at 50% and almost back to baseline oxygen requirements.   - Wean supplemental oxygen as tolerated to maintain saturations greater than 92%.          Chronic pain due to neoplasm    · Not controlled. Continue Fentanyl patch 25 mcg transdermal every 72 hours to treat.   · Add Norco orally prn for breakthrough pain control.           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  -Patient to follow-up with Heme/Onc as outpatient on discharge.           Anemia, chronic disease    -Controlled. Hgb between high 7 to low 8 throughout admission. Patient asymptomatic.   -Monitor with daily CBC in hospital and transfuse if < 7.         Coronary artery disease involving native coronary artery of native heart without angina pectoris    S/P CABG x 5  -Controlled. Patient with history of CAD/CABG as well as history of SVT. Now that patient able to take oral meds will restart Nebivolol 5 mg po daily to treat today, 8/29 (patient on at home but held since admit due to unable to use G tube and patient not tolerating oral meds or diet).             Esophageal dysphagia    -Present on admit. Patient with recent esophageal stent placement. Patient started on oral diet with clear liquids on 8/28 and tolerating so plan to advance to mechanical soft diet as per GI recs on 8/29.  -Monitor for tolerance to oral diet. Patient with G tube in place that can be used to give meds and supplement diet if patient cannot take in much po or tolerate pills.           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  - Transferred from [facility] for GI evaluation / possible AES intervention  - Reports ongoing issues with reflux / regurgitation. Patient stared on Reglan 10 mg po TID to help with food tolerance.   -EGD on 8/27 showing esophageal stent in place and patent.           Gastric outlet obstruction    -CT scan of abdomen at OSH concerning for impaired gastric emptying and constipation.  -AES consulted on admit and EGD done that showed pyloric stenosis from extrinsic compression- balloon dilitation performed with GI on 8/27.   -Clear liquid diet started as per GI on 8/28 and patient tolerated so plan to " advance to soft mechanical diet today, 8/29 and monitor to see how patient tolerates. Continue Reglan 10 mg TID PO to help with gastric emptying.   -Check residuals per G tube. If large residuals then can consider continuous tube feeds as next option vs. lower volume oral feeding with higher frequency oral intake vs  Placement of J tube extension.             Pyloric stenosis    -Dilitation on EGD 8/27. Likely contributor to gastric outlet obstruction.          Oral thrush    -Documented at OSH and started on treatment with Diflucan. Plan continue for 14 day course of Diflucan with end date of 8/30. Improving.           Severe protein-calorie malnutrition    -Cachetic appearing. Prealbumin 16 on 8/24. Body mass index is 17.29 kg/m².  -Reports recent 25-30 weight loss.  Patient on TPN but also started clear liquid diet. Patient so far tolerating clear liquid diet so will stop TPN. Plan to advance to soft mechanical diet on 8/29.   -Nutrition following. Calorie count in progress. Patient with PEG in place and consider adding nutritional supplement via PEG pending oral intake.           DNR (do not resuscitate)    Patient DNR as per patient request.           Lung cancer metastatic to bone    - hx of lung cancer resulting in esophageal compression  - s/p radiation therapy   - resume fentanyl patch and PRN pain management   - wean supplemental oxygen as tolerated              VTE Risk Mitigation (From admission, onward)        Ordered     Place sequential compression device  Until discontinued      08/24/18 0452     Place BRUCE hose  Until discontinued      08/24/18 0452              Gely Looney MD  Department of Hospital Medicine   [facility]

## 2018-08-29 NOTE — ASSESSMENT & PLAN NOTE
- Patient utilizes 2-3L NC at baseline at home--> titrating down oxygen now- down to 3.5 L on 8/28 so improving and almost back to baseline oxygen requirements.   - Wean supplemental oxygen as tolerated to maintain saturations greater than 92%.

## 2018-08-29 NOTE — ASSESSMENT & PLAN NOTE
-Controlled. Hgb between high 7 to low 8 throughout admission. Patient asymptomatic.   -Monitor with daily CBC in hospital and transfuse if < 7.

## 2018-08-29 NOTE — ASSESSMENT & PLAN NOTE
Pseudomonas respiratory infection  - Improving.   - Recent diagnosis with aspiration pneumonia while at [facility]- will be a chronic issue due to tracheo-esophagela fistula even with stent in place, cannot prevent this from being a recurrent issue and patient aware.   - Respiratory cultures positive for Pseudomonas and Candida. Candida in sputum not usually treated- on diflucan at OSH on chart check it appears for oropharyngeal thrush at that time and end date for Diflucan is 8/30. Patient on IV Zosyn for 14 day course to treat pseudomonas lung infection with end date 8/30.   -Per pulm note at OSH patient was started on IV steroids for pneumonia/high oxygen requirements at [facility]. Patient on methylprednisolone 40 mg IV daily and now that on po will switch to Prednisone 20 mg po daily on 8/28 and wean to off.    -Continue Duo nebs BID with albuterol nebs PRN SOB.  -Continue aspiration precautions.  -Maintain HOB > 30.

## 2018-08-29 NOTE — ASSESSMENT & PLAN NOTE
- Improving. Patient utilizes 2-3L NC at baseline at home--> titrating down oxygen- down to 3.5-4 L on 8/29 so improving from admit when patient was on high flow oxygen at 50% and almost back to baseline oxygen requirements.   - Wean supplemental oxygen as tolerated to maintain saturations greater than 92%.

## 2018-08-29 NOTE — SUBJECTIVE & OBJECTIVE
Interval History: Patient so far reporting he is tolerating clear liquid diet with no nausea or vomiting. Patient still on TPN but plan to stop TPN this evening and advance diet in am to see how tolerates. Patient with PEG in place and consider starting TF to supplement nutrition if cannot take in enough oral diet.     Review of Systems   Constitutional: Negative for chills and fever.   Respiratory: Negative for cough and shortness of breath.    Cardiovascular: Negative for chest pain, palpitations and leg swelling.   Gastrointestinal: Negative for abdominal pain, diarrhea, nausea and vomiting.   Musculoskeletal: Positive for back pain. Negative for neck pain.   Skin: Negative for rash.   Neurological: Negative for dizziness and light-headedness.   Psychiatric/Behavioral: Negative for confusion and hallucinations.     Objective:     Vital Signs (Most Recent):  Temp: 98.9 °F (37.2 °C) (08/28/18 1510)  Pulse: 80 (08/28/18 1800)  Resp: 20 (08/28/18 1510)  BP: 122/67 (08/28/18 1510)  SpO2: (!) 92 % (08/28/18 1510) Vital Signs (24h Range):  Temp:  [97.6 °F (36.4 °C)-98.9 °F (37.2 °C)] 98.9 °F (37.2 °C)  Pulse:  [71-96] 80  Resp:  [16-20] 20  SpO2:  [89 %-96 %] 92 %  BP: (118-137)/(61-69) 122/67     Weight: 48.6 kg (107 lb 2.3 oz)  Body mass index is 17.29 kg/m².    Intake/Output Summary (Last 24 hours) at 8/28/2018 1906  Last data filed at 8/28/2018 1845  Gross per 24 hour   Intake 4769.26 ml   Output 1950 ml   Net 2819.26 ml      Physical Exam   Constitutional: He is oriented to person, place, and time. He appears cachectic. He is cooperative. Nasal cannula in place.   HENT:   RIJ TLC   Eyes: No scleral icterus.   Neck: Neck supple.   Cardiovascular: Normal rate, regular rhythm, normal heart sounds and intact distal pulses.   No murmur heard.  Pulmonary/Chest: Effort normal. No respiratory distress. He has no wheezes. He has no rales.   Abdominal: Soft. Bowel sounds are normal. He exhibits no distension. There is no  tenderness. There is no rebound and no guarding.   PEG to LUQ    Musculoskeletal: He exhibits no edema.   Neurological: He is alert and oriented to person, place, and time.   Skin: Skin is warm and dry. Capillary refill takes less than 2 seconds. No rash noted.   Fentanyl patch to L anterior chest wall    Psychiatric: He has a normal mood and affect. His behavior is normal. Thought content normal.   Nursing note and vitals reviewed.      Significant Labs:   CBC:   Recent Labs   Lab  08/27/18   0442  08/28/18   0615   WBC  29.84*  24.00*   HGB  8.1*  8.1*   HCT  25.9*  26.7*   PLT  634*  601*     CMP:   Recent Labs   Lab  08/27/18   0442  08/28/18   0615   NA  132*  133*   K  4.1  3.9   CL  101  104   CO2  26  24   GLU  171*  153*   BUN  23  22   CREATININE  0.7  0.6   CALCIUM  9.1  8.8   PROT  5.7*  5.6*   ALBUMIN  2.0*  2.0*   BILITOT  0.4  0.3   ALKPHOS  100  100   AST  22  16   ALT  52*  44   ANIONGAP  5*  5*   EGFRNONAA  >60.0  >60.0     POCT Glucose:   Recent Labs   Lab  08/28/18   0829  08/28/18   1130  08/28/18   1646   POCTGLUCOSE  170*  217*  179*     Significant Imaging: I have reviewed all pertinent imaging results/findings within the past 24 hours.

## 2018-08-29 NOTE — PLAN OF CARE
Report received, care assumed. Had a rough evening. Q4 prn narcotic med NOT managing pain well pain. Called the team and received an extra prn order.for dilaudid which produced fair results. TPN and lipids discontinued and calorie count will be initiated when his next tray is delivered. Reviewed POC and will continue to monitor.

## 2018-08-30 LAB
ALBUMIN SERPL BCP-MCNC: 1.9 G/DL
ALP SERPL-CCNC: 115 U/L
ALT SERPL W/O P-5'-P-CCNC: 42 U/L
ANION GAP SERPL CALC-SCNC: 6 MMOL/L
AST SERPL-CCNC: 26 U/L
BASOPHILS # BLD AUTO: 0.04 K/UL
BASOPHILS NFR BLD: 0.2 %
BILIRUB SERPL-MCNC: 0.5 MG/DL
BUN SERPL-MCNC: 23 MG/DL
CALCIUM SERPL-MCNC: 8.8 MG/DL
CHLORIDE SERPL-SCNC: 104 MMOL/L
CO2 SERPL-SCNC: 25 MMOL/L
CREAT SERPL-MCNC: 0.6 MG/DL
DIFFERENTIAL METHOD: ABNORMAL
EOSINOPHIL # BLD AUTO: 0.1 K/UL
EOSINOPHIL NFR BLD: 0.3 %
ERYTHROCYTE [DISTWIDTH] IN BLOOD BY AUTOMATED COUNT: 18 %
EST. GFR  (AFRICAN AMERICAN): >60 ML/MIN/1.73 M^2
EST. GFR  (NON AFRICAN AMERICAN): >60 ML/MIN/1.73 M^2
GLUCOSE SERPL-MCNC: 70 MG/DL
HCT VFR BLD AUTO: 24.2 %
HGB BLD-MCNC: 7.3 G/DL
IMM GRANULOCYTES # BLD AUTO: 0.51 K/UL
IMM GRANULOCYTES NFR BLD AUTO: 2.5 %
LYMPHOCYTES # BLD AUTO: 0.9 K/UL
LYMPHOCYTES NFR BLD: 4.2 %
MCH RBC QN AUTO: 25.1 PG
MCHC RBC AUTO-ENTMCNC: 30.2 G/DL
MCV RBC AUTO: 83 FL
MONOCYTES # BLD AUTO: 1.9 K/UL
MONOCYTES NFR BLD: 9.4 %
NEUTROPHILS # BLD AUTO: 16.7 K/UL
NEUTROPHILS NFR BLD: 83.4 %
NRBC BLD-RTO: 0 /100 WBC
PLATELET # BLD AUTO: 547 K/UL
PMV BLD AUTO: 9.4 FL
POTASSIUM SERPL-SCNC: 3.9 MMOL/L
PROT SERPL-MCNC: 5.2 G/DL
RBC # BLD AUTO: 2.91 M/UL
SODIUM SERPL-SCNC: 135 MMOL/L
WBC # BLD AUTO: 20.03 K/UL

## 2018-08-30 PROCEDURE — 94761 N-INVAS EAR/PLS OXIMETRY MLT: CPT

## 2018-08-30 PROCEDURE — 25000003 PHARM REV CODE 250: Performed by: HOSPITALIST

## 2018-08-30 PROCEDURE — 97116 GAIT TRAINING THERAPY: CPT

## 2018-08-30 PROCEDURE — 85025 COMPLETE CBC W/AUTO DIFF WBC: CPT

## 2018-08-30 PROCEDURE — 25000003 PHARM REV CODE 250: Performed by: INTERNAL MEDICINE

## 2018-08-30 PROCEDURE — 63600175 PHARM REV CODE 636 W HCPCS: Performed by: INTERNAL MEDICINE

## 2018-08-30 PROCEDURE — 27000221 HC OXYGEN, UP TO 24 HOURS

## 2018-08-30 PROCEDURE — 63600175 PHARM REV CODE 636 W HCPCS: Performed by: HOSPITALIST

## 2018-08-30 PROCEDURE — 94640 AIRWAY INHALATION TREATMENT: CPT

## 2018-08-30 PROCEDURE — 99232 SBSQ HOSP IP/OBS MODERATE 35: CPT | Mod: ,,, | Performed by: INTERNAL MEDICINE

## 2018-08-30 PROCEDURE — 25000003 PHARM REV CODE 250: Performed by: NURSE PRACTITIONER

## 2018-08-30 PROCEDURE — 11000001 HC ACUTE MED/SURG PRIVATE ROOM

## 2018-08-30 PROCEDURE — P9047 ALBUMIN (HUMAN), 25%, 50ML: HCPCS | Mod: JG | Performed by: NURSE PRACTITIONER

## 2018-08-30 PROCEDURE — 80053 COMPREHEN METABOLIC PANEL: CPT

## 2018-08-30 PROCEDURE — 63600175 PHARM REV CODE 636 W HCPCS: Mod: JG | Performed by: NURSE PRACTITIONER

## 2018-08-30 PROCEDURE — 25000242 PHARM REV CODE 250 ALT 637 W/ HCPCS: Performed by: HOSPITALIST

## 2018-08-30 RX ORDER — ALBUMIN HUMAN 250 G/1000ML
25 SOLUTION INTRAVENOUS ONCE
Status: COMPLETED | OUTPATIENT
Start: 2018-08-30 | End: 2018-08-30

## 2018-08-30 RX ORDER — FENTANYL 25 UG/1
1 PATCH TRANSDERMAL
Status: DISCONTINUED | OUTPATIENT
Start: 2018-08-30 | End: 2018-08-31 | Stop reason: HOSPADM

## 2018-08-30 RX ADMIN — PIPERACILLIN AND TAZOBACTAM 4.5 G: 4; .5 INJECTION, POWDER, LYOPHILIZED, FOR SOLUTION INTRAVENOUS; PARENTERAL at 08:08

## 2018-08-30 RX ADMIN — HYDROMORPHONE HYDROCHLORIDE 1 MG: 1 INJECTION, SOLUTION INTRAMUSCULAR; INTRAVENOUS; SUBCUTANEOUS at 03:08

## 2018-08-30 RX ADMIN — HYDROMORPHONE HYDROCHLORIDE 1 MG: 1 INJECTION, SOLUTION INTRAMUSCULAR; INTRAVENOUS; SUBCUTANEOUS at 10:08

## 2018-08-30 RX ADMIN — IPRATROPIUM BROMIDE AND ALBUTEROL SULFATE 3 ML: .5; 3 SOLUTION RESPIRATORY (INHALATION) at 07:08

## 2018-08-30 RX ADMIN — METOCLOPRAMIDE 10 MG: 10 TABLET ORAL at 12:08

## 2018-08-30 RX ADMIN — IPRATROPIUM BROMIDE AND ALBUTEROL SULFATE 3 ML: .5; 3 SOLUTION RESPIRATORY (INHALATION) at 06:08

## 2018-08-30 RX ADMIN — BISACODYL 10 MG: 10 SUPPOSITORY RECTAL at 08:08

## 2018-08-30 RX ADMIN — METOCLOPRAMIDE 10 MG: 10 TABLET ORAL at 05:08

## 2018-08-30 RX ADMIN — IPRATROPIUM BROMIDE AND ALBUTEROL SULFATE 3 ML: .5; 3 SOLUTION RESPIRATORY (INHALATION) at 12:08

## 2018-08-30 RX ADMIN — FLUCONAZOLE 100 MG: 2 INJECTION, SOLUTION INTRAVENOUS at 12:08

## 2018-08-30 RX ADMIN — IPRATROPIUM BROMIDE AND ALBUTEROL SULFATE 3 ML: .5; 3 SOLUTION RESPIRATORY (INHALATION) at 02:08

## 2018-08-30 RX ADMIN — HYDROMORPHONE HYDROCHLORIDE 1 MG: 1 INJECTION, SOLUTION INTRAMUSCULAR; INTRAVENOUS; SUBCUTANEOUS at 08:08

## 2018-08-30 RX ADMIN — NEBIVOLOL HYDROCHLORIDE 5 MG: 5 TABLET ORAL at 08:08

## 2018-08-30 RX ADMIN — HYDROMORPHONE HYDROCHLORIDE 1 MG: 1 INJECTION, SOLUTION INTRAMUSCULAR; INTRAVENOUS; SUBCUTANEOUS at 12:08

## 2018-08-30 RX ADMIN — PREDNISONE 20 MG: 20 TABLET ORAL at 08:08

## 2018-08-30 RX ADMIN — PIPERACILLIN AND TAZOBACTAM 4.5 G: 4; .5 INJECTION, POWDER, LYOPHILIZED, FOR SOLUTION INTRAVENOUS; PARENTERAL at 12:08

## 2018-08-30 RX ADMIN — FENTANYL 1 PATCH: 25 PATCH, EXTENDED RELEASE TRANSDERMAL at 09:08

## 2018-08-30 RX ADMIN — HYDROMORPHONE HYDROCHLORIDE 1 MG: 1 INJECTION, SOLUTION INTRAMUSCULAR; INTRAVENOUS; SUBCUTANEOUS at 05:08

## 2018-08-30 RX ADMIN — METOCLOPRAMIDE 10 MG: 10 TABLET ORAL at 04:08

## 2018-08-30 RX ADMIN — ALBUMIN HUMAN 25 G: 0.25 SOLUTION INTRAVENOUS at 09:08

## 2018-08-30 RX ADMIN — PIPERACILLIN AND TAZOBACTAM 4.5 G: 4; .5 INJECTION, POWDER, LYOPHILIZED, FOR SOLUTION INTRAVENOUS; PARENTERAL at 03:08

## 2018-08-30 RX ADMIN — PANTOPRAZOLE SODIUM 40 MG: 40 GRANULE, DELAYED RELEASE ORAL at 08:08

## 2018-08-30 NOTE — ASSESSMENT & PLAN NOTE
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  - Transferred from [facility] for GI evaluation / possible AES intervention  - Reports ongoing issues with reflux / regurgitation. Patient stared on Reglan 10 mg po TID to help with food tolerance.   -EGD on 8/27 showing esophageal stent in place and patent.

## 2018-08-30 NOTE — ASSESSMENT & PLAN NOTE
-Cachetic appearing. Prealbumin 16 on 8/24. Body mass index is 17.27 kg/m².  -Reports recent 25-30 weight loss.  -Patient on mechanical diet and tolerating and patient eating 50% of meals.   -Nutrition following. Calorie count in progress. Patient with PEG in place and will need nutritional supplement via PEG to augment nutrition on discharge.

## 2018-08-30 NOTE — PT/OT/SLP PROGRESS
Physical Therapy Treatment    Patient Name:  Ismael Montiel   MRN:  [id number]    Recommendations:     Discharge Recommendations:  home health PT   Discharge Equipment Recommendations: walker, rolling, wheelchair   Barriers to discharge: None    Assessment:     Ismael Montiel is a 67 y.o. male admitted with a medical diagnosis of Aspiration pneumonia.  He presents with the following impairments/functional limitations:  weakness, gait instability, impaired balance, impaired endurance, decreased lower extremity function, impaired functional mobilty, decreased coordination, decreased safety awareness  Pt able to incr gait stability and quality in today's session. Continues to req incr assistance due to poor endurance with activity. Pt remains appropriate for continued skilled services and discharge to postacute location to address the below deficits and improve pt return to PLOF.        Rehab Prognosis:  Good; patient would benefit from acute skilled PT services to address these deficits and reach maximum level of function.      Recent Surgery: Procedure(s) (LRB):  ESOPHAGOGASTRODUODENOSCOPY (EGD) (N/A) 3 Days Post-Op    Plan:     During this hospitalization, patient to be seen 3 x/week to address the above listed problems via gait training, therapeutic activities, therapeutic exercises, neuromuscular re-education  · Plan of Care Expires:  09/24/18   Plan of Care Reviewed with: patient, spouse    Subjective     Communicated with nsg prior to session.  Patient found supine in bed c/ wife at bedside upon PT entry to room, agreeable to treatment.      Chief Complaint: fatigue with activity  Patient comments/goals: to discharge home on following day  Pain/Comfort:  · Pain Rating 1: 0/10    Patients cultural, spiritual, Yarsani conflicts given the current situation: none stated    Objective:     Patient found with: PICC line, oxygen, telemetry     General Precautions: Standard, fall, aspiration   Orthopedic  Precautions:N/A   Braces: N/A     Functional Mobility  Bed Mobility  Scooting towards EOB: supervision  Supine to Sit: stand by assistance   Sit to Supine: stand by assistance   Transfers Scooting Along EOB: SBA  Sit to Stand:  stand by assistance with rolling walker for 2 trials     Gait Gait Distance: 100 ft   Assistive Device Used: RW  Assistance Level: stand by assistance  Description: slowed milton with 2 standing rest breaks due to poor oxygenation. Oxygen utilized during walk.          Balance   Static Sitting supervision   Dynamic Sitting supervision   Static Standing stand by assistance   Dynamic Standing stand by assistance   '      AM-PAC 6 CLICK MOBILITY  Turning over in bed (including adjusting bedclothes, sheets and blankets)?: 3  Sitting down on and standing up from a chair with arms (e.g., wheelchair, bedside commode, etc.): 3  Moving from lying on back to sitting on the side of the bed?: 3  Moving to and from a bed to a chair (including a wheelchair)?: 3  Need to walk in hospital room?: 3  Climbing 3-5 steps with a railing?: 3  Basic Mobility Total Score: 18       Therapeutic Activities and Exercises:  EDUCATION  Pt educated pt on incr OOB activity including sitting in bedside chair majority of day, utilizing bathroom for toileting needs, and amb with nsg and PCT 1 person   Educated pt on being appropriate to ambulate with nsg and PCT with 1  person assistance.  Updated white board with appropriate PT mobility information for medical team notification  Pt verbalized agreement.       Patient left up in chair with all lines intact, call button in reach and wife present..    GOALS:   Multidisciplinary Problems     Physical Therapy Goals        Problem: Physical Therapy Goal    Goal Priority Disciplines Outcome Goal Variances Interventions   Physical Therapy Goal     PT, PT/OT Ongoing (interventions implemented as appropriate)     Description:  Goals to be met by: 9/7/2018    Patient will increase  functional independence with mobility by performing:    Supine <> sit with Modified [place].  Sit <> stand transfer with Modified [place] using No Assistive Device and Rolling Walker.  Bed <> chair transfer via Stand Pivot with Modified [place] using No Assistive Device and Rolling Walker.  Gait  x 150 feet with Contact Guard Assistance using Rolling Walker to prepare for community ambulation and endurance activities.  Dynamic standing for 8 minutes with Contact Guard Assistance using No Assistive Device to prepare for functional tasks in standing.  Able to tolerate exercise for 15-20 reps with independence.                      Time Tracking:     PT Received On: 08/30/18  PT Start Time: 1400     PT Stop Time: 1410  PT Total Time (min): 10 min     Billable Minutes: Gait Training 10    Treatment Type: Treatment  PT/PTA: PT     PTA Visit Number: 2     Tatyana Pickett, PT  08/30/2018

## 2018-08-30 NOTE — NURSING
Calorie count...breakfast  Scrambled eggs 25%, 1 sausage amanda  25%,  Belgian toast 25%,  applesause 75%.  Lunch: pork loin 10%,  Maple mashed sweet potato 50%, green beans 75% and 50% of mini apple pie. (menus placed on chart).

## 2018-08-30 NOTE — ASSESSMENT & PLAN NOTE
S/P CABG x 5  -Controlled. Patient with history of CAD/CABG as well as history of SVT.   -Continue Nebivolol 5 mg po daily to treat.

## 2018-08-30 NOTE — ASSESSMENT & PLAN NOTE
- Improving. Patient utilizes 3 liters NC at baseline at home--> titrating down oxygen- down to 3.5 L on 8/30 so improving from admit when patient was on high flow oxygen at 50% and almost back to baseline oxygen requirements.   - Wean supplemental oxygen as tolerated to maintain saturations greater than 92%.  - Wife to bring home oxygen tank tomorrow from home as patient being discharged home tomorrow, 8/30.

## 2018-08-30 NOTE — ASSESSMENT & PLAN NOTE
-CT scan of abdomen at OSH concerning for impaired gastric emptying and constipation.  -AES consulted on admit and EGD done that showed pyloric stenosis from extrinsic compression- balloon dilitation performed with GI on 8/27.   -Clear liquid diet started as per GI on 8/28 and patient tolerated so advanced to soft mechanical diet on 8/29 and tolerating. Continue Reglan 10 mg TID PO to help with gastric emptying.

## 2018-08-30 NOTE — PLAN OF CARE
Problem: Pressure Ulcer Risk (Delroy Scale) (Adult,Obstetrics,Pediatric)  Goal: Skin Integrity  Patient will demonstrate the desired outcomes by discharge/transition of care.  Outcome: Ongoing (interventions implemented as appropriate)   08/30/18 1832   Pressure Ulcer Risk (Delroy Scale) (Adult,Obstetrics,Pediatric)   Skin Integrity making progress toward outcome       Problem: Fall Risk (Adult)  Goal: Absence of Falls  Patient will demonstrate the desired outcomes by discharge/transition of care.  Outcome: Ongoing (interventions implemented as appropriate)   08/30/18 1832   Fall Risk (Adult)   Absence of Falls making progress toward outcome   Pt is free of falls and injuries per shift , fall precautions maintained .     Problem: Pain, Acute (Adult)  Goal: Acceptable Pain Control/Comfort Level  Patient will demonstrate the desired outcomes by discharge/transition of care.  Outcome: Ongoing (interventions implemented as appropriate)   08/30/18 1832   Pain, Acute (Adult)   Acceptable Pain Control/Comfort Level making progress toward outcome   Pt C/O pain in his lower chest 8/10 and PRN pain medication given as needed . Pt reported moderate relief of pain .

## 2018-08-30 NOTE — PROGRESS NOTES
[facility] Hospital Medicine  Progress Note    Patient Name: Ismael Montiel  MRN: [id number]  Patient Class: IP- Inpatient   Admission Date: 8/24/2018  Length of Stay: 6 days  Attending Physician: Gely Looney MD  Primary Care Provider: Deejay Heck Jr, MD    Hospital Medicine Team: [facility] K Gely Looney MD    Subjective:     Principal Problem:Aspiration pneumonia    HPI:  66 y/o gentleman, who was transferred from [facility] for GI evaluation.  He has a PMH of lung cancer resulting in esophageal compression s/p stenting, chronic hypoxic respiratory failure requiring supplemental oxygen at 2-3L NC, HTN, and CAD.  He states that symptoms started around the time the stent was placed on 07/20/18.  On 08/17/18 he presented to [facility] and was found to have patchy bilateral infiltrates on CXR.  He was diagnosed with aspiration pneumonia and started empirically on ABX. Respiratory cultures were positive for pseudomonas and candida. He denies fever, chills, or diarrhea. His O2 requirements have increased and he is currently on 8L high flow NC.  He endorses stable SOB, chest / epigastric discomfort, nausea, feeling of abdominal fullness, and constipation.  He has been on TPN d/t aspiration concerns.  He reports that his PEG tube was replaced 08/23/18 d/t faulty balloon.  He underwent an esophagram and CT abd earlier today which suggested the esophageal stent was patent. CT was concerning for impaired gastric emptying and constipation.    Hospital Course:  8/24: GI consulted- plan for EGD, possible G to J tube conversion on Monday. Patient comfortable on exam. No distress. Plans discussed  8/25: titrated from 10L NC to 8L NC. No issues overnight. Pain well controlled with dilaudid. PT/OT consulted for recommendations  8:26: down to 4-5L NC today. Feeling improved overall. Titrated IV steroids down today. Plan for 14 day course of fluconazole for thrush hx, zosyn  for pneumonia total.  8/27: EGD showing large amount of food residue in stomach with gastric stenosis at pylorus from extrinsic severe stenosis/compression causing the partial outlet obstruction with balloon dilitation. Stent in place. GI recs for clear liquid diet, check G tube residuals, start reglan 10 mg TID PO.   8/28: Patient so far tolerating clear liquid diet. AES okay with advancing to soft mechanical diet but recommend to avoid foods that are high in fiber. Plan to advance to mechanical soft diet on 8/29. Will allow TPN to end tonight and not reorder. If patient able to tolerate enough oral diet may not need to supplement with TF via his G tube but will need to see how patient tolerates oral food intake. Oxygenation improved and patient down to 3.5 liters of oxygen.   8/29: Calorie count in progress. Patient's diet advanced to mechanical soft diet. Monitoring oral intake and so far is tolerating oral diet. Patient off TPN. Patient still having significant back pain related to his metastatic lung cancer.   8/30: Patient eating about 50% of meals. Discussed with patient and wife about supplementing with cans of Boost at home to help with nutrition. Patient tolerating soft, mechanical diet without difficulty. Plan home discharge tomorrow,8/31. Patient on 3.5 liters of oxygen. Patient on 3 liters at home.      Interval History: Patient eating 50% of meals. Discussed with patient and wife about continuing with soft diet on discharge as patient is tolerating and supplementing with Boost via PEG for his nutrition. Plan home discharge tomorrow, 8/31. Patient reporting 5/10 back pain.     Review of Systems   Constitutional: Negative for chills and fever.   Respiratory: Negative for cough and shortness of breath.    Cardiovascular: Negative for chest pain, palpitations and leg swelling.   Gastrointestinal: Negative for abdominal pain, diarrhea, nausea and vomiting.   Musculoskeletal: Positive for back pain. Negative  for neck pain.   Skin: Negative for rash.   Neurological: Negative for dizziness and light-headedness.   Psychiatric/Behavioral: Negative for confusion and hallucinations.     Objective:     Vital Signs (Most Recent):  Temp: 97.4 °F (36.3 °C) (08/30/18 1130)  Pulse: 76 (08/30/18 1129)  Resp: 18 (08/30/18 1129)  BP: (!) 94/51 (08/30/18 1129)  SpO2: 98 % (08/30/18 1129) Vital Signs (24h Range):  Temp:  [97.4 °F (36.3 °C)-98.4 °F (36.9 °C)] 97.4 °F (36.3 °C)  Pulse:  [65-88] 76  Resp:  [16-20] 18  SpO2:  [92 %-99 %] 98 %  BP: ()/(51-61) 94/51     Weight: 48.5 kg (107 lb)  Body mass index is 17.27 kg/m².    Intake/Output Summary (Last 24 hours) at 8/30/2018 1321  Last data filed at 8/30/2018 0500  Gross per 24 hour   Intake 475 ml   Output 750 ml   Net -275 ml      Physical Exam   Constitutional: He is oriented to person, place, and time. He appears cachectic. He is cooperative. Nasal cannula in place.   HENT:   RIJ TLC   Eyes: No scleral icterus.   Neck: Neck supple.   Cardiovascular: Normal rate, regular rhythm, normal heart sounds and intact distal pulses.   No murmur heard.  Pulmonary/Chest: Effort normal. No respiratory distress. He has no wheezes. He has no rales.   Abdominal: Soft. Bowel sounds are normal. He exhibits no distension. There is no tenderness. There is no rebound and no guarding.   PEG to LUQ    Musculoskeletal: He exhibits no edema.   Neurological: He is alert and oriented to person, place, and time.   Skin: Skin is warm and dry. Capillary refill takes less than 2 seconds. No rash noted.   Fentanyl patch to L anterior chest wall    Psychiatric: He has a normal mood and affect. His behavior is normal. Thought content normal.   Nursing note and vitals reviewed.      Significant Labs:   CBC:   Recent Labs   Lab  08/29/18   0406  08/30/18   0400   WBC  23.41*  20.03*   HGB  7.8*  7.3*   HCT  25.1*  24.2*   PLT  556*  547*     CMP:   Recent Labs   Lab  08/29/18   0406  08/30/18   0400   NA  134*   135*   K  4.2  3.9   CL  103  104   CO2  26  25   GLU  60*  70   BUN  21  23   CREATININE  0.6  0.6   CALCIUM  8.8  8.8   PROT  5.3*  5.2*   ALBUMIN  1.9*  1.9*   BILITOT  0.5  0.5   ALKPHOS  103  115   AST  17  26   ALT  36  42   ANIONGAP  5*  6*   EGFRNONAA  >60.0  >60.0       Significant Imaging: I have reviewed all pertinent imaging results/findings within the past 24 hours.    Assessment/Plan:      * Aspiration pneumonia    Pseudomonas respiratory infection  - Improving. WBC starting to decrease as steroids weaned so likely cause of persistent leukocytosis on labs is steroids not worsening lung infection.   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   - Respiratory cultures positive for Pseudomonas and Candida. Candida in sputum not usually treated- on diflucan at OSH on chart check it appears for oropharyngeal thrush at that time and end date for Diflucan is 8/30. Patient on IV Zosyn for 14 day course to treat pseudomonas lung infection with end date 8/30.   -Per pulm note at OSH patient was started on IV steroids for pneumonia/high oxygen requirements at [facility]. Patient on Methylprednisolone 40 mg IV daily initially then switched to Prednisone 20 mg po daily on 8/28 and weaned to off on 8/30 as patient does not need long term and puts patient at higher risk for future infections.   -Continue Duo nebs BID with albuterol nebs PRN SOB.  -Continue aspiration precautions.  -Maintain HOB > 30.        Acute on chronic respiratory failure with hypoxia    - Improving. Patient utilizes 3 liters NC at baseline at home--> titrating down oxygen- down to 3.5 L on 8/30 so improving from admit when patient was on high flow oxygen at 50% and almost back to baseline oxygen requirements.   - Wean supplemental oxygen as tolerated to maintain saturations  "greater than 92%.  - Wife to bring home oxygen tank tomorrow from home as patient being discharged home tomorrow, 8/30.           Chronic pain due to neoplasm    Not controlled. Continue Fentanyl patch 25 mcg transdermal every 48 hours to treat as on at home.   Continue Norco orally prn for breakthrough pain control.           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  -Patient to follow-up with Heme/Onc as outpatient on discharge.           Anemia, chronic disease    -Controlled. Hgb between high 7 to low 8 throughout admission. Patient asymptomatic.   -Monitor with daily CBC in hospital and transfuse if < 7.         Coronary artery disease involving native coronary artery of native heart without angina pectoris    S/P CABG x 5  -Controlled. Patient with history of CAD/CABG as well as history of SVT.   -Continue Nebivolol 5 mg po daily to treat.             Esophageal dysphagia    -Present on admit. Patient with recent esophageal stent placement.   -Monitor for tolerance to oral diet and patient is tolerating soft, mechanical diet.          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  - Transferred from [facility] for GI evaluation / possible AES intervention  - Reports ongoing issues with reflux / regurgitation. Patient stared on Reglan 10 mg po TID to help with food tolerance.   -EGD on 8/27 showing esophageal stent in place and patent.           Gastric outlet obstruction    -CT scan of abdomen at OSH concerning for impaired " gastric emptying and constipation.  -AES consulted on admit and EGD done that showed pyloric stenosis from extrinsic compression- balloon dilitation performed with GI on 8/27.   -Clear liquid diet started as per GI on 8/28 and patient tolerated so advanced to soft mechanical diet on 8/29 and tolerating. Continue Reglan 10 mg TID PO to help with gastric emptying.               Pyloric stenosis    -Dilitation on EGD 8/27. Likely contributor to gastric outlet obstruction.          Oral thrush    -Documented at OSH and started on treatment with Diflucan. Plan continue for 14 day course of Diflucan with end date of 8/30. Improving.           Severe protein-calorie malnutrition    -Cachetic appearing. Prealbumin 16 on 8/24. Body mass index is 17.27 kg/m².  -Reports recent 25-30 weight loss.  -Patient on mechanical diet and tolerating and patient eating 50% of meals.   -Nutrition following. Calorie count in progress. Patient with PEG in place and will need nutritional supplement via PEG to augment nutrition on discharge.          DNR (do not resuscitate)    Patient DNR as per patient request.           Lung cancer metastatic to bone    - hx of lung cancer resulting in esophageal compression  - s/p radiation therapy   - resume fentanyl patch and PRN pain management   - wean supplemental oxygen as tolerated              VTE Risk Mitigation (From admission, onward)        Ordered     Place sequential compression device  Until discontinued      08/24/18 0452     Place BRUCE hose  Until discontinued      08/24/18 0452              Gely Looney MD  Department of Hospital Medicine   [facility]

## 2018-08-30 NOTE — SUBJECTIVE & OBJECTIVE
Interval History: Patient eating 50% of meals. Discussed with patient and wife about continuing with soft diet on discharge as patient is tolerating and supplementing with Boost via PEG for his nutrition. Plan home discharge tomorrow, 8/31. Patient reporting 5/10 back pain.     Review of Systems   Constitutional: Negative for chills and fever.   Respiratory: Negative for cough and shortness of breath.    Cardiovascular: Negative for chest pain, palpitations and leg swelling.   Gastrointestinal: Negative for abdominal pain, diarrhea, nausea and vomiting.   Musculoskeletal: Positive for back pain. Negative for neck pain.   Skin: Negative for rash.   Neurological: Negative for dizziness and light-headedness.   Psychiatric/Behavioral: Negative for confusion and hallucinations.     Objective:     Vital Signs (Most Recent):  Temp: 97.4 °F (36.3 °C) (08/30/18 1130)  Pulse: 76 (08/30/18 1129)  Resp: 18 (08/30/18 1129)  BP: (!) 94/51 (08/30/18 1129)  SpO2: 98 % (08/30/18 1129) Vital Signs (24h Range):  Temp:  [97.4 °F (36.3 °C)-98.4 °F (36.9 °C)] 97.4 °F (36.3 °C)  Pulse:  [65-88] 76  Resp:  [16-20] 18  SpO2:  [92 %-99 %] 98 %  BP: ()/(51-61) 94/51     Weight: 48.5 kg (107 lb)  Body mass index is 17.27 kg/m².    Intake/Output Summary (Last 24 hours) at 8/30/2018 1321  Last data filed at 8/30/2018 0500  Gross per 24 hour   Intake 475 ml   Output 750 ml   Net -275 ml      Physical Exam   Constitutional: He is oriented to person, place, and time. He appears cachectic. He is cooperative. Nasal cannula in place.   HENT:   RIJ TLC   Eyes: No scleral icterus.   Neck: Neck supple.   Cardiovascular: Normal rate, regular rhythm, normal heart sounds and intact distal pulses.   No murmur heard.  Pulmonary/Chest: Effort normal. No respiratory distress. He has no wheezes. He has no rales.   Abdominal: Soft. Bowel sounds are normal. He exhibits no distension. There is no tenderness. There is no rebound and no guarding.   PEG to LUQ     Musculoskeletal: He exhibits no edema.   Neurological: He is alert and oriented to person, place, and time.   Skin: Skin is warm and dry. Capillary refill takes less than 2 seconds. No rash noted.   Fentanyl patch to L anterior chest wall    Psychiatric: He has a normal mood and affect. His behavior is normal. Thought content normal.   Nursing note and vitals reviewed.      Significant Labs:   CBC:   Recent Labs   Lab  08/29/18   0406  08/30/18   0400   WBC  23.41*  20.03*   HGB  7.8*  7.3*   HCT  25.1*  24.2*   PLT  556*  547*     CMP:   Recent Labs   Lab  08/29/18   0406  08/30/18   0400   NA  134*  135*   K  4.2  3.9   CL  103  104   CO2  26  25   GLU  60*  70   BUN  21  23   CREATININE  0.6  0.6   CALCIUM  8.8  8.8   PROT  5.3*  5.2*   ALBUMIN  1.9*  1.9*   BILITOT  0.5  0.5   ALKPHOS  103  115   AST  17  26   ALT  36  42   ANIONGAP  5*  6*   EGFRNONAA  >60.0  >60.0       Significant Imaging: I have reviewed all pertinent imaging results/findings within the past 24 hours.

## 2018-08-30 NOTE — PROGRESS NOTES
Pt's BP is 89/55 , HR 86 , pt asymptomatic , IM K paged to notify . Waiting for call back . Will  keep monitoring .

## 2018-08-30 NOTE — ASSESSMENT & PLAN NOTE
-Present on admit. Patient with recent esophageal stent placement.   -Monitor for tolerance to oral diet and patient is tolerating soft, mechanical diet.

## 2018-08-30 NOTE — ASSESSMENT & PLAN NOTE
Pseudomonas respiratory infection  - Improving. WBC starting to decrease as steroids weaned so likely cause of persistent leukocytosis on labs is steroids not worsening lung infection.   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   - Respiratory cultures positive for Pseudomonas and Candida. Candida in sputum not usually treated- on diflucan at OSH on chart check it appears for oropharyngeal thrush at that time and end date for Diflucan is 8/30. Patient on IV Zosyn for 14 day course to treat pseudomonas lung infection with end date 8/30.   -Per pulm note at OSH patient was started on IV steroids for pneumonia/high oxygen requirements at [facility]. Patient on Methylprednisolone 40 mg IV daily initially then switched to Prednisone 20 mg po daily on 8/28 and weaned to off on 8/30 as patient does not need long term and puts patient at higher risk for future infections.   -Continue Duo nebs BID with albuterol nebs PRN SOB.  -Continue aspiration precautions.  -Maintain HOB > 30.

## 2018-08-30 NOTE — PLAN OF CARE
Problem: Patient Care Overview  Goal: Plan of Care Review  Outcome: Ongoing (interventions implemented as appropriate)  POC reviewed,no fall,use of wedge encouraged,poor appetite,peg tube clamped,iv pain meds per pt request for pain control.

## 2018-08-30 NOTE — PLAN OF CARE
[facility]    HOME HEALTH ORDERS  FACE TO FACE ENCOUNTER    Patient Name: Ismael Montiel   YOB: 1950     PCP: Deejay Heck Jr, MD   PCP Address: [address] [place] [address] / [place] *   PCP Phone Number: [phone]   PCP Fax: [phone]     Encounter Date: 08/30/2018     Discharging Team:    [facility] HOSP MED K  [facility] ([facility])    Admit to Home Health    Diagnoses:  Active Hospital Problems    Diagnosis  POA    *Aspiration pneumonia [J69.0]  Yes     Priority: 1 - High    Acute on chronic respiratory failure with hypoxia [J96.21]  Yes     Priority: 2     Chronic pain due to neoplasm [G89.3]  Yes     Priority: 3     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]  Yes     Priority: 4     Anemia, chronic disease [D63.8]  Yes     Priority: 5     Coronary artery disease involving native coronary artery of native heart without angina pectoris [I25.10]  Yes     Priority: 6     Esophageal dysphagia [R13.10]  Yes     Priority: 7     Esophageal stricture [K22.2]  Yes     Priority: 8     Gastric outlet obstruction [K31.1]  Yes     Priority: 9     Pyloric stenosis [K31.1]  Yes     Priority: 10     Oral thrush [B37.0]  Yes     Priority: 11     Severe protein-calorie malnutrition [E43]  Yes     Priority: 12     DNR (do not resuscitate) [Z66]  Yes     Priority: 13     Gastroesophageal reflux disease with esophagitis [K21.0]  Yes    Pseudomonas respiratory infection [J98.8, A49.8]  Yes    Lung cancer metastatic to bone [C34.90, C79.51]  Yes    S/P CABG x 5 [Z95.1]  Not Applicable      Resolved Hospital Problems    Diagnosis Date Resolved POA    Constipation [K59.00] 08/28/2018 Yes          My clinical findings that support the need for the home health skilled services and home bound status are the following:  Weakness/numbness causing balance and gait disturbance due to Infection and Weakness/Debility making it taxing to leave  home.    Allergies: Review of patient's allergies indicates:  No Known Allergies     Diet: soft mechanical diet with thin liquids  Boost supplements  8 ounces via PEG TID in addition to oral diet.    Activities: activity as tolerated    Nursing:   SN to complete comprehensive assessment including routine vital signs. Instruct on disease process and s/s of complications to report to MD. Review/verify medication list sent home with the patient at time of discharge  and instruct patient/caregiver as needed. Frequency may be adjusted depending on start of care date.    Home oxygen continuous at 3 liters to maintain sats > 90%.     Notify MD if SBP > 160 or < 90; DBP > 90 or < 50; HR > 120 or < 50; Temp > 101    CONSULTS:    Physical Therapy to evaluate and treat. Evaluate for home safety and equipment needs; Establish/upgrade home exercise program. Perform / instruct on therapeutic exercises, gait training, transfer training, and Range of Motion.  Occupational Therapy to evaluate and treat. Evaluate home environment for safety and equipment needs. Perform/Instruct on transfers, ADL training, ROM, and therapeutic exercises.    MISCELLANEOUS CARE:  PEG Care:  Instruct patient/caregiver to clean site.  Monitor skin integrity.  N/A    WOUND CARE ORDERS  n/a    Medications: Review discharge medications with patient and family and provide education.      Current Discharge Medication List      CONTINUE these medications which have NOT CHANGED    Details   acetaminophen (TYLENOL) 325 MG tablet Take 2 tablets (650 mg total) by mouth every 8 (eight) hours as needed for Temperature greater than (or equal to 101 degree F).  Refills: 0      albuterol 90 mcg/actuation inhaler Inhale 2 puffs into the lungs every 4 (four) hours as needed for Wheezing or Shortness of Breath. Rescue  Qty: 1 Inhaler, Refills: 0      albuterol-ipratropium (DUO-NEB) 2.5 mg-0.5 mg/3 mL nebulizer solution Take 3 mLs by nebulization every 6 (six) hours while  awake. Rescue  Qty: 1 Box, Refills: 0      fentaNYL (DURAGESIC) 25 mcg/hr Place 1 patch onto the skin every 48 hours.  Qty: 15 patch, Refills: 0      metoclopramide HCl (REGLAN) 5 MG tablet Take 1 tablet (5 mg total) by mouth 3 (three) times daily.  Qty: 90 tablet, Refills: 1      nebivolol (BYSTOLIC) 5 MG Tab Take 5 mg by mouth once daily.      ondansetron (ZOFRAN) 8 MG tablet Take 1 tablet (8 mg total) by mouth every 8 (eight) hours as needed for Nausea.  Qty: 30 tablet, Refills: 2      oxyCODONE (ROXICODONE) 5 MG immediate release tablet 1 or 2 tabs per peg Q 3 HOURS PRN pain  Qty: 200 tablet, Refills: 0    Associated Diagnoses: Bronchogenic cancer of right lung      prochlorperazine (COMPAZINE) 10 MG tablet Take 1 tablet (10 mg total) by mouth every 6 (six) hours as needed (nausea and vomitting.  Rotate with Zofran if needed.).  Qty: 30 tablet, Refills: 1         STOP taking these medications       enoxaparin (LOVENOX) 40 mg/0.4 mL Syrg Comments:   Reason for Stopping:         methylPREDNISolone sodium succinate (SOLU-MEDROL) 40 mg/mL SolR Comments:   Reason for Stopping:         metoclopramide HCl (REGLAN) 5 mg/mL injection Comments:   Reason for Stopping:         piperacillin sodium/tazobactam (PIPERACILLIN-TAZOBACTAM 4.5G/100ML D5W IVPB, READY TO MIX,) Comments:   Reason for Stopping:         sodium chloride 3.5% 3.5 % nebulizer solution Comments:   Reason for Stopping:         tobramycin (NEBCIN) 40 mg/mL injection Comments:   Reason for Stopping:                I certify that this patient is confined to his home and needs intermittent skilled nursing care, physical therapy and occupational therapy.    _________________________________  Gely Looney MD  08/30/2018

## 2018-08-30 NOTE — PLAN OF CARE
Problem: Physical Therapy Goal  Goal: Physical Therapy Goal  Goals to be met by: 9/7/2018    Patient will increase functional independence with mobility by performing:    Supine <> sit with Modified Carter.  Sit <> stand transfer with Modified Carter using No Assistive Device and Rolling Walker.  Bed <> chair transfer via Stand Pivot with Modified Carter using No Assistive Device and Rolling Walker.  Gait  x 150 feet with Contact Guard Assistance using Rolling Walker to prepare for community ambulation and endurance activities.  Dynamic standing for 8 minutes with Contact Guard Assistance using No Assistive Device to prepare for functional tasks in standing.  Able to tolerate exercise for 15-20 reps with independence.     Outcome: Ongoing (interventions implemented as appropriate)  No goals met on today. Goals remain appropriate.    Tatyana Pickett, PT, DPT  8/30/2018

## 2018-08-30 NOTE — ASSESSMENT & PLAN NOTE
Not controlled. Continue Fentanyl patch 25 mcg transdermal every 48 hours to treat as on at home.   Continue Norco orally prn for breakthrough pain control.

## 2018-08-31 VITALS
WEIGHT: 107 LBS | RESPIRATION RATE: 18 BRPM | BODY MASS INDEX: 17.19 KG/M2 | HEIGHT: 66 IN | OXYGEN SATURATION: 94 % | TEMPERATURE: 98 F | SYSTOLIC BLOOD PRESSURE: 124 MMHG | HEART RATE: 83 BPM | DIASTOLIC BLOOD PRESSURE: 59 MMHG

## 2018-08-31 PROBLEM — B96.5 PSEUDOMONAS RESPIRATORY INFECTION: Status: RESOLVED | Noted: 2018-08-22 | Resolved: 2018-08-31

## 2018-08-31 PROBLEM — J98.8 PSEUDOMONAS RESPIRATORY INFECTION: Status: RESOLVED | Noted: 2018-08-22 | Resolved: 2018-08-31

## 2018-08-31 PROBLEM — J96.21 ACUTE ON CHRONIC RESPIRATORY FAILURE WITH HYPOXIA: Status: RESOLVED | Noted: 2018-08-24 | Resolved: 2018-08-31

## 2018-08-31 PROBLEM — J69.0 ASPIRATION PNEUMONIA: Status: RESOLVED | Noted: 2018-05-22 | Resolved: 2018-08-31

## 2018-08-31 LAB
ABO + RH BLD: NORMAL
BASOPHILS # BLD AUTO: 0.01 K/UL
BASOPHILS NFR BLD: 0.1 %
BLD GP AB SCN CELLS X3 SERPL QL: NORMAL
BLD PROD TYP BPU: NORMAL
BLOOD UNIT EXPIRATION DATE: NORMAL
BLOOD UNIT TYPE CODE: 5100
BLOOD UNIT TYPE: NORMAL
CODING SYSTEM: NORMAL
DIFFERENTIAL METHOD: ABNORMAL
DISPENSE STATUS: NORMAL
EOSINOPHIL # BLD AUTO: 0.1 K/UL
EOSINOPHIL NFR BLD: 0.6 %
ERYTHROCYTE [DISTWIDTH] IN BLOOD BY AUTOMATED COUNT: 18.5 %
HCT VFR BLD AUTO: 21.4 %
HGB BLD-MCNC: 6.6 G/DL
IMM GRANULOCYTES # BLD AUTO: 0.28 K/UL
IMM GRANULOCYTES NFR BLD AUTO: 1.7 %
LYMPHOCYTES # BLD AUTO: 0.7 K/UL
LYMPHOCYTES NFR BLD: 4.1 %
MCH RBC QN AUTO: 25.9 PG
MCHC RBC AUTO-ENTMCNC: 30.8 G/DL
MCV RBC AUTO: 84 FL
MONOCYTES # BLD AUTO: 1.5 K/UL
MONOCYTES NFR BLD: 9.3 %
NEUTROPHILS # BLD AUTO: 13.7 K/UL
NEUTROPHILS NFR BLD: 84.2 %
NRBC BLD-RTO: 0 /100 WBC
NUM UNITS TRANS PACKED RBC: NORMAL
PLATELET # BLD AUTO: 451 K/UL
PMV BLD AUTO: 9.3 FL
RBC # BLD AUTO: 2.55 M/UL
WBC # BLD AUTO: 16.25 K/UL

## 2018-08-31 PROCEDURE — 63600175 PHARM REV CODE 636 W HCPCS: Performed by: INTERNAL MEDICINE

## 2018-08-31 PROCEDURE — 94761 N-INVAS EAR/PLS OXIMETRY MLT: CPT

## 2018-08-31 PROCEDURE — 99239 HOSP IP/OBS DSCHRG MGMT >30: CPT | Mod: ,,, | Performed by: INTERNAL MEDICINE

## 2018-08-31 PROCEDURE — 86901 BLOOD TYPING SEROLOGIC RH(D): CPT

## 2018-08-31 PROCEDURE — P9016 RBC LEUKOCYTES REDUCED: HCPCS

## 2018-08-31 PROCEDURE — 94640 AIRWAY INHALATION TREATMENT: CPT

## 2018-08-31 PROCEDURE — 25000242 PHARM REV CODE 250 ALT 637 W/ HCPCS: Performed by: HOSPITALIST

## 2018-08-31 PROCEDURE — 25000003 PHARM REV CODE 250: Performed by: INTERNAL MEDICINE

## 2018-08-31 PROCEDURE — 36430 TRANSFUSION BLD/BLD COMPNT: CPT

## 2018-08-31 PROCEDURE — 27000221 HC OXYGEN, UP TO 24 HOURS

## 2018-08-31 PROCEDURE — 85025 COMPLETE CBC W/AUTO DIFF WBC: CPT

## 2018-08-31 PROCEDURE — 25000003 PHARM REV CODE 250: Performed by: HOSPITALIST

## 2018-08-31 RX ORDER — HYDROCODONE BITARTRATE AND ACETAMINOPHEN 500; 5 MG/1; MG/1
TABLET ORAL
Status: DISCONTINUED | OUTPATIENT
Start: 2018-08-31 | End: 2018-08-31 | Stop reason: HOSPADM

## 2018-08-31 RX ORDER — METOCLOPRAMIDE 10 MG/1
10 TABLET ORAL
Qty: 90 TABLET | Refills: 2 | Status: SHIPPED | OUTPATIENT
Start: 2018-08-31 | End: 2018-11-29

## 2018-08-31 RX ADMIN — METOCLOPRAMIDE 10 MG: 10 TABLET ORAL at 04:08

## 2018-08-31 RX ADMIN — IPRATROPIUM BROMIDE AND ALBUTEROL SULFATE 3 ML: .5; 3 SOLUTION RESPIRATORY (INHALATION) at 07:08

## 2018-08-31 RX ADMIN — PANTOPRAZOLE SODIUM 40 MG: 40 GRANULE, DELAYED RELEASE ORAL at 09:08

## 2018-08-31 RX ADMIN — METOCLOPRAMIDE 10 MG: 10 TABLET ORAL at 12:08

## 2018-08-31 RX ADMIN — HYDROMORPHONE HYDROCHLORIDE 1 MG: 1 INJECTION, SOLUTION INTRAMUSCULAR; INTRAVENOUS; SUBCUTANEOUS at 04:08

## 2018-08-31 RX ADMIN — HYDROMORPHONE HYDROCHLORIDE 1 MG: 1 INJECTION, SOLUTION INTRAMUSCULAR; INTRAVENOUS; SUBCUTANEOUS at 12:08

## 2018-08-31 RX ADMIN — HYDROMORPHONE HYDROCHLORIDE 1 MG: 1 INJECTION, SOLUTION INTRAMUSCULAR; INTRAVENOUS; SUBCUTANEOUS at 08:08

## 2018-08-31 RX ADMIN — NEBIVOLOL HYDROCHLORIDE 5 MG: 5 TABLET ORAL at 09:08

## 2018-08-31 RX ADMIN — IPRATROPIUM BROMIDE AND ALBUTEROL SULFATE 3 ML: .5; 3 SOLUTION RESPIRATORY (INHALATION) at 03:08

## 2018-08-31 RX ADMIN — IPRATROPIUM BROMIDE AND ALBUTEROL SULFATE 3 ML: .5; 3 SOLUTION RESPIRATORY (INHALATION) at 01:08

## 2018-08-31 NOTE — PLAN OF CARE
Patient has no HH preference. Patient unsure of HH he used in the past. CM noted patient had [facility] in April of 2018. CM sent a HH referral and clinical documentation to [facility] via [facility].    Addendum on 8/31/18 at 1148: CM called [facility] and spoke with Jacklyn in admissions regarding referral. Jacklyn stated they received the referral and accepted the patient. [facility] to start seeing patient at home on Saturday 9/1/18.    Johanna Barba RN, CM [facility]  [phone] -[phone]

## 2018-08-31 NOTE — PLAN OF CARE
Patient set up with [facility]. No other discharge needs identified. SW and CM will continue to follow for any additional needs. Discharge and follow-up instructions to be completed by the bedside nurse.    Future Appointments   Date Time Provider Department Center   9/4/2018 To Be Determined Johnie Sanchez PT [facility] None   9/4/2018 To Be Determined Fariha Rene OT [facility] None      08/31/18 1402   Final Note   Assessment Type Final Discharge Note   Discharge Disposition Home-Health  ([facility])   What phone number can be called within the next 1-3 days to see how you are doing after discharge? ([phone])   Hospital Follow Up  Appt(s) scheduled? Yes   Discharge plans and expectations educations in teach back method with documentation complete? Yes

## 2018-08-31 NOTE — PLAN OF CARE
Problem: Patient Care Overview  Goal: Plan of Care Review  Outcome: Ongoing (interventions implemented as appropriate)  POC reviewed,voiced understanding,no injury,requested iv dilaudid q4hrs,stating it wasn't lasting ,notified provider and received order for Fentanyl patch for better pain control,pt slept much better than previous night.

## 2018-08-31 NOTE — PLAN OF CARE
TIO ordered a rolling walker from [facility] with [facility]. DME for bedside delivery.    Johanna Barba RN, CM  [facility]  [phone] -[phone]

## 2018-08-31 NOTE — NURSING
Discharge instructions given to the patient and wife and verbalized understanding. Printed prescription was given. Patient is waiting for hospital wheelchair to d/c.

## 2018-09-01 NOTE — ASSESSMENT & PLAN NOTE
Pseudomonas respiratory infection  - Resolved. WBC decreased as steroids weaned so likely cause of persistent leukocytosis on labs is steroids not worsening lung infection and WBC down to 16,000 on discharge from high of 29,000 during admit. Patient afebrile with no signs of sepsis on discharge.    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   - Respiratory cultures positive for Pseudomonas and Candida. Candida in sputum not usually treated- on diflucan at OSH on chart check it appears for oropharyngeal thrush at that time and end date for Diflucan is 8/30. Patient on IV Zosyn for 14 day course to treat pseudomonas lung infection and completed therapy on 8/30 and no further abx needed on discharge.  -Per pulm note at OSH patient was started on IV steroids for pneumonia/high oxygen requirements at [facility]. Patient on Methylprednisolone 40 mg IV daily initially then switched to Prednisone 20 mg po daily on 8/28 and weaned to off on 8/30 as patient does not need long term and puts patient at higher risk for future infections.   -Continue aspiration precautions at home.  -Maintain HOB > 30 at home.

## 2018-09-01 NOTE — ASSESSMENT & PLAN NOTE
-CT scan of abdomen at OSH concerning for impaired gastric emptying and constipation.  -AES consulted on admit and EGD done that showed pyloric stenosis from extrinsic compression- balloon dilitation performed with GI on 8/27.   -Clear liquid diet started as per GI on 8/28 and patient tolerated so advanced to soft mechanical diet on 8/29 and tolerating. Patient discharged on soft mechanical low fiber diet as per GI recs. Patient to continue Reglan 10 mg TID PO to help with gastric emptying.   AES stated patient does not fransisco follow-up in their clinic and follow-up as needed for repeat dilation of pylorus as needed.

## 2018-09-01 NOTE — ASSESSMENT & PLAN NOTE
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  - Transferred from [facility] for GI evaluation / possible AES intervention  - Reports ongoing issues with reflux / regurgitation. Patient stared on Reglan 10 mg po TID to help with food tolerance and patient tolerated.  -EGD on 8/27 showing esophageal stent in place and patent.

## 2018-09-01 NOTE — ASSESSMENT & PLAN NOTE
-Cachetic appearing. Prealbumin 16 on 8/24. Body mass index is 17.27 kg/m².  -Reports recent 25-30 weight loss.  -Patient on mechanical diet and tolerating and patient eating 50-75% of meals on discharge with Boost for supplementation and patient to continue oral diet + Boost on discharge.

## 2018-09-01 NOTE — ASSESSMENT & PLAN NOTE
· Controlled on discharge. Continue Fentanyl patch 25 mcg transdermal every 48 hours to treat as on at home on discharge with Oxycodone IR 5 mg po every 3 hours as needed for breakthrough pain control.   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

## 2018-09-01 NOTE — DISCHARGE SUMMARY
[facility] Hospital Medicine  Discharge Summary      Patient Name: Ismael Montiel  MRN: [id number]  Admission Date: 8/24/2018  Hospital Length of Stay: 7 days  Discharge Date and Time: 8/31/2018  6:02 PM  Attending Physician: Gely Looney MD   Discharging Provider: Gely Looney MD  Primary Care Provider: Deejay Heck Jr, MD  Hospital Medicine Team: [facility] HOSP MED K Gely Looney MD    HPI:   68 y/o gentleman, who was transferred from [facility] for GI evaluation.  He has a PMH of lung cancer resulting in esophageal compression s/p stenting, chronic hypoxic respiratory failure requiring supplemental oxygen at 2-3L NC, HTN, and CAD.  He states that symptoms started around the time the stent was placed on 07/20/18.  On 08/17/18 he presented to [facility] and was found to have patchy bilateral infiltrates on CXR.  He was diagnosed with aspiration pneumonia and started empirically on ABX. Respiratory cultures were positive for pseudomonas and candida. He denies fever, chills, or diarrhea. His O2 requirements have increased and he is currently on 8L high flow NC.  He endorses stable SOB, chest / epigastric discomfort, nausea, feeling of abdominal fullness, and constipation.  He has been on TPN d/t aspiration concerns.  He reports that his PEG tube was replaced 08/23/18 d/t faulty balloon.  He underwent an esophagram and CT abd earlier today which suggested the esophageal stent was patent. CT was concerning for impaired gastric emptying and constipation.    8/27/2018  Procedure(s) (LRB):  ESOPHAGOGASTRODUODENOSCOPY (EGD) (N/A)    Surgeon(s):  Garth Mata MD   Findings:       An esophageal stent was found in the entire esophagus. Stent is        entirely intraesophageal. Stent is patent.       A large amount of food (residue) was found in the gastric body.       An extrinsic severe stenosis was found at the pylorus. This was        traversed. A TTS dilator was  passed through the scope. Dilation with        an 18-19-20 mm balloon dilator was performed to 20 mm. The dilation        site was examined and showed complete resolution of luminal        narrowing.       The examined duodenum was normal.  Impression:   - Pre-existing esophageal stent.                        - A large amount of food (residue) in the stomach.                        - Gastric stenosis was found at the pylorus.                         Dilated up to 20mm balloon. Likely contributing his                         symptoms as causing partial gastric outlet                         obstruction.                        - Normal examined duodenum.                        - No specimens collected.    Interval History: Patient eating 50-75% of meals. Discussed with patient and wife about continuing with soft diet on discharge as patient is tolerating and supplementing with Boost for his nutrition. Wife and patient aware of discharge plan. Patient reporting 5/10 back pain.    Temp:  [98.1 °F (36.7 °C)-99.6 °F (37.6 °C)] 98.1 °F (36.7 °C)  Pulse:  [65-87] 83  Resp:  [16-18] 18  SpO2:  [90 %-97 %] 94 %  BP: (106-124)/(56-65) 124/59     Physical Exam   Constitutional: He is oriented to person, place, and time. He appears cachectic. He is cooperative. Nasal cannula in place.   HENT:  Eyes: No scleral icterus.   Neck: Neck supple.   Cardiovascular: Normal rate, regular rhythm, normal heart sounds and intact distal pulses.   No murmur heard.  Pulmonary/Chest: Effort normal. No respiratory distress. He has no wheezes. He has no rales.   Abdominal: Soft. Bowel sounds are normal. He exhibits no distension. There is no tenderness. There is no rebound and no guarding.   PEG to LUQ    Musculoskeletal: He exhibits no edema.   Neurological: He is alert and oriented to person, place, and time.   Skin: Skin is warm and dry. Capillary refill takes less than 2 seconds. No rash noted.   Fentanyl patch to L anterior chest wall     Psychiatric: He has a normal mood and affect. His behavior is normal. Thought content normal.       Hospital Course:   8/24: GI consulted- plan for EGD, possible G to J tube conversion on Monday. Patient comfortable on exam. No distress. Plans discussed  8/25: titrated from 10L NC to 8L NC. No issues overnight. Pain well controlled with dilaudid. PT/OT consulted for recommendations  8:26: down to 4-5L NC today. Feeling improved overall. Titrated IV steroids down today. Plan for 14 day course of fluconazole for thrush hx, zosyn for pneumonia total.  8/27: EGD showing large amount of food residue in stomach with gastric stenosis at pylorus from extrinsic severe stenosis/compression causing the partial outlet obstruction with balloon dilitation. Stent in place. GI recs for clear liquid diet, check G tube residuals, start reglan 10 mg TID PO.   8/28: Patient so far tolerating clear liquid diet. AES okay with advancing to soft mechanical diet but recommend to avoid foods that are high in fiber. Plan to advance to mechanical soft diet on 8/29. Will allow TPN to end tonight and not reorder. If patient able to tolerate enough oral diet may not need to supplement with TF via his G tube but will need to see how patient tolerates oral food intake. Oxygenation improved and patient down to 3.5 liters of oxygen.   8/29: Calorie count in progress. Patient's diet advanced to mechanical soft diet. Monitoring oral intake and so far is tolerating oral diet. Patient off TPN. Patient still having significant back pain related to his metastatic lung cancer.   8/30: Patient eating about 50% of meals. Discussed with patient and wife about supplementing with cans of Boost at home to help with nutrition. Patient tolerating soft, mechanical diet without difficulty. Plan home discharge tomorrow,8/31. Patient on 3.5 liters of oxygen. Patient on 3 liters at home.  8/31: Patient tolerating soft diet with no difficulties. Boost added to  supplement diet and patient can either drink or use PEG tube. Spoke with GI and AES service states patient just needs follow-up as needed. If patient has recurrence of symptoms to call GI clinic to arrange for follow-up. Patient to follow-up with his primary care physician in 1-2 weeks and his oncologist on [facility] in 2-3 weeks to discuss continued treatment of his lung cancer. Patient discharged home with [facility] PT/OT and nursing. Patient discharged on Reglan 10 mg po TID with meals to help with gastric emptying as per GI recs.      Consults:   Consults (From admission, onward)        Status Ordering Provider     Inpatient consult to [facility] ([facility]  Once     Provider:  (Not yet assigned)    Completed ANGEL AGUILAR          * Aspiration pneumonia-resolved as of 8/31/2018    Pseudomonas respiratory infection  - Resolved. WBC decreased as steroids weaned so likely cause of persistent leukocytosis on labs is steroids not worsening lung infection and WBC down to 16,000 on discharge from high of 29,000 during admit. Patient afebrile with no signs of sepsis on discharge.    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   - Respiratory cultures positive for Pseudomonas and Candida. Candida in sputum not usually treated- on diflucan at OSH on chart check it appears for oropharyngeal thrush at that time and end date for Diflucan is 8/30. Patient on IV Zosyn for 14 day course to treat pseudomonas lung infection and completed therapy on 8/30 and no further abx needed on discharge.  -Per pulm note at OSH patient was started on IV steroids for pneumonia/high oxygen requirements at [facility]. Patient on Methylprednisolone 40 mg IV daily initially then switched to Prednisone 20 mg po daily on 8/28 and weaned to off on 8/30 as patient  "does not need long term and puts patient at higher risk for future infections.   -Continue aspiration precautions at home.  -Maintain HOB > 30 at home.        Acute on chronic respiratory failure with hypoxia    - Improved. Patient utilizes 3 liters NC at baseline at home--> titrating down oxygen- down to 3.5 L on 8/30 and back to baseline of 3 liters on 8/31 on discharge with oxygen sat > 90% improved from admit when patient was on high flow oxygen at 50% and almost back to baseline oxygen requirements.   - Patient discharged home on supplemental oxygen at 3 liters continuous.           Chronic pain due to neoplasm    · Controlled on discharge. Continue Fentanyl patch 25 mcg transdermal every 48 hours to treat as on at home on discharge with Oxycodone IR 5 mg po every 3 hours as needed for breakthrough pain control.   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  -Patient to follow-up with his regular Heme/Onc doctor as outpatient on discharge in 2-3 weeks to discuss further treatment of cancer.           Anemia, chronic disease    Controlled. Hgb between high 7 to low 8 throughout admission but on day of discharge Hgb 6.6 and likely related to his frequent lab draws in hospital and chronic anemia so patient transfused 1 unit of PRBCs on 8/31 and tolerated procedure.         Coronary artery disease involving native coronary artery of native heart without angina pectoris    S/P CABG x 5  -Controlled. Patient with history of CAD/CABG as well as history of " SVT.   -Continue Nebivolol 5 mg po daily to treat on discharge as taking at home prior to this admit.             Esophageal dysphagia    -Present on admit. Patient with recent esophageal stent placement.   -Patient tolerating oral diet with no difficulty on discharge. Patient to continue soft mechanical diet on discharge.          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  - Transferred from [facility] for GI evaluation / possible AES intervention  - Reports ongoing issues with reflux / regurgitation. Patient stared on Reglan 10 mg po TID to help with food tolerance and patient tolerated.  -EGD on 8/27 showing esophageal stent in place and patent.           Gastric outlet obstruction    -CT scan of abdomen at OSH concerning for impaired gastric emptying and constipation.  -AES consulted on admit and EGD done that showed pyloric stenosis from extrinsic compression- balloon dilitation performed with GI on 8/27.   -Clear liquid diet started as per GI on 8/28 and patient tolerated so advanced to soft mechanical diet on 8/29 and tolerating. Patient discharged on soft mechanical low fiber diet as per GI recs. Patient to continue Reglan 10 mg TID PO to help with gastric emptying.   AES stated patient does not fransisco follow-up in their clinic and follow-up as needed for repeat dilation of pylorus as needed.              Pyloric stenosis    -Dilitation on EGD 8/27. Likely contributor to gastric outlet obstruction.          Oral thrush    -Documented at OSH and started on treatment with Diflucan. Patient treated for 14 day course of Diflucanin hospital and no further Diflucan needed on discharge.           Severe protein-calorie malnutrition    -Cachetic appearing. Prealbumin 16 on 8/24. Body mass index is 17.27 kg/m².  -Reports recent 25-30 weight loss.  -Patient on mechanical diet and tolerating and patient eating 50-75% of meals on  discharge with Boost for supplementation and patient to continue oral diet + Boost on discharge.         DNR (do not resuscitate)    Patient DNR as per patient request.           Lung cancer metastatic to bone    - hx of lung cancer resulting in esophageal compression  - s/p radiation therapy   - Fentanyl patch and Oxy IR PRN pain management              Final Active Diagnoses:    Diagnosis Date Noted POA    Acute on chronic respiratory failure with hypoxia [J96.21] 08/24/2018 Yes    Chronic pain due to neoplasm [G89.3] 08/24/2018 Yes    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] 09/26/2017 Yes    Anemia, chronic disease [D63.8] 08/17/2018 Yes    Coronary artery disease involving native coronary artery of native heart without angina pectoris [I25.10]  Yes    Esophageal dysphagia [R13.10] 09/05/2017 Yes    Esophageal stricture [K22.2] 06/05/2018 Yes    Gastric outlet obstruction [K31.1] 08/27/2018 Yes    Pyloric stenosis [K31.1] 08/27/2018 Yes    Oral thrush [B37.0] 08/26/2018 Yes    Severe protein-calorie malnutrition [E43] 04/12/2018 Yes    DNR (do not resuscitate) [Z66] 08/24/2018 Yes    Gastroesophageal reflux disease with esophagitis [K21.0] 08/24/2018 Yes    Pseudomonas respiratory infection [J98.8, A49.8] 08/22/2018 Yes    Lung cancer metastatic to bone [C34.90, C79.51]  Yes    S/P CABG x 5 [Z95.1]  Not Applicable      Problems Resolved During this Admission:    Diagnosis Date Noted Date Resolved POA    PRINCIPAL PROBLEM:  Aspiration pneumonia [J69.0] 05/22/2018 08/31/2018 Yes    Constipation [K59.00] 08/24/2018 08/28/2018 Yes       Discharged Condition: good    Disposition: Home with Home Health Care ([facility] PT/OT and Nursing)    Follow Up:  Follow-up Information     [facility].    Why:  Home Health Services  Contact information:  [address]  [facility] [address] [phone]           Deejay Heck Jr, MD In 2 weeks.    Specialty:   "Family Medicine  Contact information:  [address] [facility] [address]  [facility] [address]  [phone]                 Patient Instructions:      WALKER FOR HOME USE     Order Specific Question Answer Comments   Type of Walker: Adult (5'4"-6'6")    With wheels? Yes    Height: 5' 6" (1.676 m)    Weight: 48.5 kg (107 lb)    Length of need (1-99 months): 99    Does patient have medical equipment at home? oxygen    Please check all that apply: Patient is unable to safely ambulate without equipment.    Please check all that apply: Walker will be used for gait training.    Vendor: [facility]    Expected Date of Delivery: 8/31/2018      Diet Adult Regular     Order Specific Question Answer Comments   Additional restrictions: Dental Soft    Additional restrictions: Low Fiber      Call MD for:  temperature >100.4     Call MD for:  persistent nausea and vomiting or diarrhea     Call MD for:  severe uncontrolled pain     Call MD for:  redness, tenderness, or signs of infection (pain, swelling, redness, odor or green/yellow discharge around incision site)     Call MD for:  difficulty breathing or increased cough     Call MD for:  severe persistent headache     Call MD for:  worsening rash     Call MD for:  persistent dizziness, light-headedness, or visual disturbances     Call MD for:  increased confusion or weakness       Significant Diagnostic Studies: Labs:   CMP   Recent Labs   Lab  08/30/18   0400   NA  135*   K  3.9   CL  104   CO2  25   GLU  70   BUN  23   CREATININE  0.6   CALCIUM  8.8   PROT  5.2*   ALBUMIN  1.9*   BILITOT  0.5   ALKPHOS  115   AST  26   ALT  42   ANIONGAP  6*   ESTGFRAFRICA  >60.0   EGFRNONAA  >60.0    and CBC   Recent Labs   Lab  08/30/18   0400  08/31/18   0400   WBC  20.03*  16.25*   HGB  7.3*  6.6*   HCT  24.2*  21.4*   PLT  547*  451*       Pending Diagnostic Studies:     None         Medications:  Reconciled Home Medications:      Medication List      CHANGE how you take these " medications    metoclopramide HCl 10 MG tablet  Commonly known as:  REGLAN  Take 1 tablet (10 mg total) by mouth 3 (three) times daily before meals.  What changed:    · medication strength  · how much to take  · when to take this  · Another medication with the same name was removed. Continue taking this medication, and follow the directions you see here.        CONTINUE taking these medications    acetaminophen 325 MG tablet  Commonly known as:  TYLENOL  Take 2 tablets (650 mg total) by mouth every 8 (eight) hours as needed for Temperature greater than (or equal to 101 degree F).     albuterol 90 mcg/actuation inhaler  Inhale 2 puffs into the lungs every 4 (four) hours as needed for Wheezing or Shortness of Breath. Rescue     albuterol-ipratropium 2.5 mg-0.5 mg/3 mL nebulizer solution  Commonly known as:  DUO-NEB  Take 3 mLs by nebulization every 6 (six) hours while awake. Rescue     BYSTOLIC 5 MG Tab  Generic drug:  nebivolol  Take 5 mg by mouth once daily.     fentaNYL 25 mcg/hr  Commonly known as:  DURAGESIC  Place 1 patch onto the skin every 48 hours.     ondansetron 8 MG tablet  Commonly known as:  ZOFRAN  Take 1 tablet (8 mg total) by mouth every 8 (eight) hours as needed for Nausea.     oxyCODONE 5 MG immediate release tablet  Commonly known as:  ROXICODONE  1 or 2 tabs per peg Q 3 HOURS PRN pain     prochlorperazine 10 MG tablet  Commonly known as:  COMPAZINE  Take 1 tablet (10 mg total) by mouth every 6 (six) hours as needed (nausea and vomitting.  Rotate with Zofran if needed.).        STOP taking these medications    enoxaparin 40 mg/0.4 mL Syrg  Commonly known as:  LOVENOX     methylPREDNISolone sodium succinate 40 mg/mL Solr  Commonly known as:  SOLU-MEDROL     PIPERACILLIN-TAZOBACTAM 4.5G/100ML D5W IVPB (READY TO MIX)     sodium chloride 3.5% 3.5 % nebulizer solution     tobramycin 40 mg/mL injection  Commonly known as:  NEBCIN            Indwelling Lines/Drains at time of discharge:    Lines/Drains/Airways            Drain                 Gastrostomy/Enterostomy Percutaneous endoscopic gastrostomy (PEG) LUQ feeding -- days          Pressure Ulcer                 Pressure Injury 08/24/18 1252 medial Coccyx Stage 1 7 days                Time spent on the discharge of patient: 38 minutes  Patient was seen and examined on the date of discharge and determined to be suitable for discharge.         Gely Looney MD  Department of Hospital Medicine  [facility]

## 2018-09-01 NOTE — ASSESSMENT & PLAN NOTE
Controlled. Hgb between high 7 to low 8 throughout admission but on day of discharge Hgb 6.6 and likely related to his frequent lab draws in hospital and chronic anemia so patient transfused 1 unit of PRBCs on 8/31 and tolerated procedure.

## 2018-09-01 NOTE — ASSESSMENT & PLAN NOTE
S/P CABG x 5  -Controlled. Patient with history of CAD/CABG as well as history of SVT.   -Continue Nebivolol 5 mg po daily to treat on discharge as taking at home prior to this admit.

## 2018-09-01 NOTE — ASSESSMENT & PLAN NOTE
- hx of lung cancer resulting in esophageal compression  - s/p radiation therapy   - Fentanyl patch and Oxy IR PRN pain management

## 2018-09-01 NOTE — ASSESSMENT & PLAN NOTE
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  -Patient to follow-up with his regular Heme/Onc doctor as outpatient on discharge in 2-3 weeks to discuss further treatment of cancer.     "

## 2018-09-01 NOTE — ASSESSMENT & PLAN NOTE
- Improved. Patient utilizes 3 liters NC at baseline at home--> titrating down oxygen- down to 3.5 L on 8/30 and back to baseline of 3 liters on 8/31 on discharge with oxygen sat > 90% improved from admit when patient was on high flow oxygen at 50% and almost back to baseline oxygen requirements.   - Patient discharged home on supplemental oxygen at 3 liters continuous.

## 2018-09-01 NOTE — ASSESSMENT & PLAN NOTE
-Present on admit. Patient with recent esophageal stent placement.   -Patient tolerating oral diet with no difficulty on discharge. Patient to continue soft mechanical diet on discharge.

## 2018-09-01 NOTE — ASSESSMENT & PLAN NOTE
-Documented at OSH and started on treatment with Diflucan. Patient treated for 14 day course of Diflucanin hospital and no further Diflucan needed on discharge.

## 2020-07-15 NOTE — H&P
Cholesterol is elevated but shows improvement since last check 5 months ago. A1c shows improvement at 5.9.  Statin therapy recommended History & Physical - Short Stay  Gastroenterology      SUBJECTIVE:     Procedure: EGD    Chief Complaint/Indication for Procedure: esophageal stricture    History of Present Illness:  Patient is a 67 y.o. male  with severe esophageal stricture coming today for stent exchange.     PTA Medications   Medication Sig    0.9 % SODIUM CHLORIDE (NORMAL SALINE FLUSH INJ) Inject 10 mLs into the vein As instructed. Prior to and after IV ampicillin    albuterol 90 mcg/actuation inhaler Inhale 2 puffs into the lungs every 4 (four) hours as needed for Wheezing or Shortness of Breath. Rescue    aluminum hydrox-magnesium carb (GAVISCON EXTRA STRENGTH) 160-105 mg Chew Take 1 tablet by mouth 2 (two) times daily as needed.    amlodipine (NORVASC) 5 MG tablet Take 2.5 mg by mouth once daily.    benzonatate (TESSALON PERLES) 100 MG capsule Take 1 capsule (100 mg total) by mouth every 6 (six) hours as needed for Cough.    diphenhydrAMINE (BENADRYL) 50 mg/mL injection Inject 50 mg into the muscle As instructed for Allergies (allergic reaction). 25-50mg IM/IV one dose only as needed for anaphylactic reaction    EPINEPHrine (EPIPEN) 0.3 mg/0.3 mL AtIn Inject 0.3 each into the skin once as needed (allergic reaction). take 0.3mg into the skin or muscle x 1 dose only as needed for allergic reaction    fentaNYL (DURAGESIC) 100 mcg/hr Place 1 patch onto the skin every 48 hours.    HEPARIN SOD,PORCINE/0.9 % NACL (HEPARIN FLUSH IV) Inject 5 mLs into the vein As instructed. following NS as flush for midline    hydrocodone-chlorpheniramine (TUSSIONEX) 10-8 mg/5 mL suspension 5 mLs by Per G Tube route 2 (two) times daily as needed for Cough.    L.acidophil,parac-S.therm-Bif. (RISAQUAD) Cap capsule Take 1 capsule by mouth once daily.    lactulose (CHRONULAC) 10 gram/15 mL solution Take 10 g by mouth 2 (two) times daily.     metoclopramide HCl (REGLAN) 10 MG tablet Take 10 mg by mouth every 12 (twelve) hours.    nebivolol (BYSTOLIC) 5 MG  Tab Take 5 mg by mouth once daily.    nutritional supplements (OSMOLITE 1.2 HARRY) 0.06 gram-1.2 kcal/mL Liqd 1.5 Cans by Gastrostomy Tube route 4 (four) times daily.    ondansetron (ZOFRAN) 8 MG tablet Take 1 tablet (8 mg total) by mouth every 8 (eight) hours as needed for Nausea.    oxyCODONE (ROXICODONE) 5 MG immediate release tablet 1 or 2 tabs per peg Q 3 HOURS PRN pain    pantoprazole (PROTONIX) 40 MG tablet Take 1 tablet (40 mg total) by mouth once daily.    peg-electrolyte soln 420 gram SolR TK 4000 ML PO ONCE    prochlorperazine (COMPAZINE) 10 MG tablet Take 1 tablet (10 mg total) by mouth every 6 (six) hours as needed (nausea and vomitting.  Rotate with Zofran if needed.).       Review of patient's allergies indicates:  No Known Allergies     Past Medical History:   Diagnosis Date    CAD (coronary artery disease) 2014    Cancer     esophagus    Esophageal stenosis     Hyperlipidemia     Hypertension     S/P CABG x 5 2005     Past Surgical History:   Procedure Laterality Date    CARDIAC SURGERY  2005    5 vessel     CORONARY ARTERY BYPASS GRAFT      X 5    CORONARY STENT PLACEMENT      X 1    ESOPHAGOGASTRODUODENOSCOPY N/A 6/8/2018    Procedure: EGD (ESOPHAGOGASTRODUODENOSCOPY);  Surgeon: Kandy Mayfield MD;  Location: [address]);  Service: Endoscopy;  Laterality: N/A;  Need EGD with dilation for esophageal stricture.     ESOPHAGOGASTRODUODENOSCOPY N/A 6/29/2018    Procedure: EGD (ESOPHAGOGASTRODUODENOSCOPY);  Surgeon: Kandy Mayfield MD;  Location: [address]);  Service: Endoscopy;  Laterality: N/A;    GASTROSTOMY TUBE PLACEMENT  10/09/2017    HEEL SPUR SURGERY Left 2005     Family History   Problem Relation Age of Onset    Rheum arthritis Mother     Heart disease Mother     Cancer Father         liver ca    Cancer Sister         lung/thoracic wall ca (neg tob)    Aneurysm Brother     No Known Problems Daughter      Social History   Substance Use Topics     Smoking status: Current Every Day Smoker     Packs/day: 0.25     Years: 50.00     Types: Cigarettes     Start date: 12/4/1964    Smokeless tobacco: Never Used    Alcohol use No      Comment:  none in 8 months            OBJECTIVE:     Vital Signs (Most Recent)  Temp: 98.1 °F (36.7 °C) (07/20/18 0923)  Pulse: 61 (07/20/18 0923)  Resp: 17 (07/20/18 0923)  BP: 109/62 (07/20/18 0923)  SpO2: 95 % (07/20/18 0923)       ASSESSMENT/PLAN:     Patient is a 67 y.o. male  with severe esophageal stricture coming today for stent exchange.     Plan: EGD    Anesthesia Plan: Moderate Sedation    ASA Grade: ASA 2 - Patient with mild systemic disease with no functional limitations